# Patient Record
Sex: MALE | Race: WHITE | HISPANIC OR LATINO | Employment: PART TIME | URBAN - METROPOLITAN AREA
[De-identification: names, ages, dates, MRNs, and addresses within clinical notes are randomized per-mention and may not be internally consistent; named-entity substitution may affect disease eponyms.]

---

## 2017-02-24 ENCOUNTER — HOSPITAL ENCOUNTER (EMERGENCY)
Facility: HOSPITAL | Age: 37
Discharge: HOME | End: 2017-02-25
Attending: EMERGENCY MEDICINE
Payer: MEDICARE

## 2017-02-24 DIAGNOSIS — K86.1 CHRONIC PANCREATITIS (HCC): Primary | ICD-10-CM

## 2017-02-24 LAB
ALBUMIN SERPL BCP-MCNC: 4.5 G/DL (ref 3.5–5)
ALP SERPL-CCNC: 121 U/L (ref 46–116)
ALT SERPL W P-5'-P-CCNC: 28 U/L (ref 12–78)
ANION GAP SERPL CALCULATED.3IONS-SCNC: 10 MMOL/L (ref 4–13)
AST SERPL W P-5'-P-CCNC: 22 U/L (ref 5–45)
BACTERIA UR QL AUTO: ABNORMAL /HPF
BASOPHILS # BLD AUTO: 0.02 THOUSANDS/ΜL (ref 0–0.1)
BASOPHILS NFR BLD AUTO: 0 % (ref 0–1)
BILIRUB SERPL-MCNC: 0.52 MG/DL (ref 0.2–1)
BILIRUB UR QL STRIP: ABNORMAL
BUN SERPL-MCNC: 22 MG/DL (ref 5–25)
CALCIUM SERPL-MCNC: 9.6 MG/DL (ref 8.3–10.1)
CHLORIDE SERPL-SCNC: 97 MMOL/L (ref 100–108)
CLARITY UR: CLEAR
CO2 SERPL-SCNC: 36 MMOL/L (ref 21–32)
COLOR UR: YELLOW
COLOR, POC: YELLOW
CREAT SERPL-MCNC: 1.05 MG/DL (ref 0.6–1.3)
EOSINOPHIL # BLD AUTO: 0.15 THOUSAND/ΜL (ref 0–0.61)
EOSINOPHIL NFR BLD AUTO: 2 % (ref 0–6)
ERYTHROCYTE [DISTWIDTH] IN BLOOD BY AUTOMATED COUNT: 14.5 % (ref 11.6–15.1)
GFR SERPL CREATININE-BSD FRML MDRD: >60 ML/MIN/1.73SQ M
GLUCOSE SERPL-MCNC: 168 MG/DL (ref 65–140)
GLUCOSE UR STRIP-MCNC: NEGATIVE MG/DL
HCT VFR BLD AUTO: 50 % (ref 36.5–49.3)
HGB BLD-MCNC: 17 G/DL (ref 12–17)
HGB UR QL STRIP.AUTO: ABNORMAL
KETONES UR STRIP-MCNC: NEGATIVE MG/DL
LEUKOCYTE ESTERASE UR QL STRIP: ABNORMAL
LIPASE SERPL-CCNC: 545 U/L (ref 73–393)
LYMPHOCYTES # BLD AUTO: 1.91 THOUSANDS/ΜL (ref 0.6–4.47)
LYMPHOCYTES NFR BLD AUTO: 29 % (ref 14–44)
MCH RBC QN AUTO: 28.6 PG (ref 26.8–34.3)
MCHC RBC AUTO-ENTMCNC: 34 G/DL (ref 31.4–37.4)
MCV RBC AUTO: 84 FL (ref 82–98)
MONOCYTES # BLD AUTO: 0.45 THOUSAND/ΜL (ref 0.17–1.22)
MONOCYTES NFR BLD AUTO: 7 % (ref 4–12)
MUCOUS THREADS UR QL AUTO: ABNORMAL
NEUTROPHILS # BLD AUTO: 4.17 THOUSANDS/ΜL (ref 1.85–7.62)
NEUTS SEG NFR BLD AUTO: 62 % (ref 43–75)
NITRITE UR QL STRIP: NEGATIVE
NON-SQ EPI CELLS URNS QL MICRO: ABNORMAL /HPF
NRBC BLD AUTO-RTO: 0 /100 WBCS
OTHER STN SPEC: ABNORMAL
PH UR STRIP.AUTO: 5.5 [PH] (ref 4.5–8)
PLATELET # BLD AUTO: 147 THOUSANDS/UL (ref 149–390)
PMV BLD AUTO: 10.1 FL (ref 8.9–12.7)
POTASSIUM SERPL-SCNC: 3.6 MMOL/L (ref 3.5–5.3)
PROT SERPL-MCNC: 8.5 G/DL (ref 6.4–8.2)
PROT UR STRIP-MCNC: ABNORMAL MG/DL
RBC # BLD AUTO: 5.95 MILLION/UL (ref 3.88–5.62)
RBC #/AREA URNS AUTO: ABNORMAL /HPF
SODIUM SERPL-SCNC: 143 MMOL/L (ref 136–145)
SP GR UR STRIP.AUTO: >=1.03 (ref 1–1.03)
UROBILINOGEN UR QL STRIP.AUTO: 0.2 E.U./DL
WBC # BLD AUTO: 6.7 THOUSAND/UL (ref 4.31–10.16)
WBC #/AREA URNS AUTO: ABNORMAL /HPF

## 2017-02-24 PROCEDURE — 36415 COLL VENOUS BLD VENIPUNCTURE: CPT

## 2017-02-24 PROCEDURE — 81001 URINALYSIS AUTO W/SCOPE: CPT

## 2017-02-24 PROCEDURE — 85025 COMPLETE CBC W/AUTO DIFF WBC: CPT

## 2017-02-24 PROCEDURE — 96375 TX/PRO/DX INJ NEW DRUG ADDON: CPT

## 2017-02-24 PROCEDURE — 81002 URINALYSIS NONAUTO W/O SCOPE: CPT

## 2017-02-24 PROCEDURE — 96374 THER/PROPH/DIAG INJ IV PUSH: CPT

## 2017-02-24 PROCEDURE — 80053 COMPREHEN METABOLIC PANEL: CPT

## 2017-02-24 PROCEDURE — 96361 HYDRATE IV INFUSION ADD-ON: CPT

## 2017-02-24 PROCEDURE — 83690 ASSAY OF LIPASE: CPT | Performed by: EMERGENCY MEDICINE

## 2017-02-24 PROCEDURE — 87086 URINE CULTURE/COLONY COUNT: CPT

## 2017-02-24 RX ORDER — ONDANSETRON 2 MG/ML
4 INJECTION INTRAMUSCULAR; INTRAVENOUS ONCE
Status: COMPLETED | OUTPATIENT
Start: 2017-02-24 | End: 2017-02-24

## 2017-02-24 RX ADMIN — HYDROMORPHONE HYDROCHLORIDE 1 MG: 1 INJECTION, SOLUTION INTRAMUSCULAR; INTRAVENOUS; SUBCUTANEOUS at 22:03

## 2017-02-24 RX ADMIN — ONDANSETRON 4 MG: 2 INJECTION INTRAMUSCULAR; INTRAVENOUS at 22:02

## 2017-02-24 RX ADMIN — SODIUM CHLORIDE 1000 ML: 0.9 INJECTION, SOLUTION INTRAVENOUS at 22:01

## 2017-02-25 VITALS
RESPIRATION RATE: 16 BRPM | TEMPERATURE: 98.3 F | WEIGHT: 230 LBS | SYSTOLIC BLOOD PRESSURE: 139 MMHG | OXYGEN SATURATION: 94 % | HEART RATE: 67 BPM | DIASTOLIC BLOOD PRESSURE: 60 MMHG

## 2017-02-25 PROCEDURE — 99284 EMERGENCY DEPT VISIT MOD MDM: CPT

## 2017-02-26 LAB — BACTERIA UR CULT: NORMAL

## 2018-03-09 ENCOUNTER — APPOINTMENT (EMERGENCY)
Dept: CT IMAGING | Facility: HOSPITAL | Age: 38
End: 2018-03-09
Payer: MEDICARE

## 2018-03-09 ENCOUNTER — HOSPITAL ENCOUNTER (EMERGENCY)
Facility: HOSPITAL | Age: 38
Discharge: HOME | End: 2018-03-09
Attending: EMERGENCY MEDICINE | Admitting: EMERGENCY MEDICINE
Payer: MEDICARE

## 2018-03-09 VITALS
WEIGHT: 234 LBS | TEMPERATURE: 99.2 F | OXYGEN SATURATION: 95 % | SYSTOLIC BLOOD PRESSURE: 151 MMHG | HEART RATE: 77 BPM | RESPIRATION RATE: 20 BRPM | DIASTOLIC BLOOD PRESSURE: 92 MMHG

## 2018-03-09 DIAGNOSIS — I10 HYPERTENSION: ICD-10-CM

## 2018-03-09 DIAGNOSIS — G89.29 CHRONIC ABDOMINAL PAIN: Primary | ICD-10-CM

## 2018-03-09 DIAGNOSIS — K86.1 CHRONIC PANCREATITIS (HCC): ICD-10-CM

## 2018-03-09 DIAGNOSIS — R10.9 CHRONIC ABDOMINAL PAIN: Primary | ICD-10-CM

## 2018-03-09 LAB
ALBUMIN SERPL BCP-MCNC: 4 G/DL (ref 3.5–5)
ALP SERPL-CCNC: 97 U/L (ref 46–116)
ALT SERPL W P-5'-P-CCNC: 20 U/L (ref 12–78)
ANION GAP SERPL CALCULATED.3IONS-SCNC: 10 MMOL/L (ref 4–13)
AST SERPL W P-5'-P-CCNC: 13 U/L (ref 5–45)
BASOPHILS # BLD AUTO: 0.02 THOUSANDS/ΜL (ref 0–0.1)
BASOPHILS NFR BLD AUTO: 0 % (ref 0–1)
BILIRUB SERPL-MCNC: 0.39 MG/DL (ref 0.2–1)
BUN SERPL-MCNC: 8 MG/DL (ref 5–25)
CALCIUM SERPL-MCNC: 9.1 MG/DL (ref 8.3–10.1)
CHLORIDE SERPL-SCNC: 101 MMOL/L (ref 100–108)
CO2 SERPL-SCNC: 29 MMOL/L (ref 21–32)
CREAT SERPL-MCNC: 0.81 MG/DL (ref 0.6–1.3)
EOSINOPHIL # BLD AUTO: 0.12 THOUSAND/ΜL (ref 0–0.61)
EOSINOPHIL NFR BLD AUTO: 2 % (ref 0–6)
ERYTHROCYTE [DISTWIDTH] IN BLOOD BY AUTOMATED COUNT: 14 % (ref 11.6–15.1)
GFR SERPL CREATININE-BSD FRML MDRD: 112 ML/MIN/1.73SQ M
GLUCOSE SERPL-MCNC: 149 MG/DL (ref 65–140)
HCT VFR BLD AUTO: 46.1 % (ref 36.5–49.3)
HGB BLD-MCNC: 15.5 G/DL (ref 12–17)
LIPASE SERPL-CCNC: 256 U/L (ref 73–393)
LYMPHOCYTES # BLD AUTO: 1.31 THOUSANDS/ΜL (ref 0.6–4.47)
LYMPHOCYTES NFR BLD AUTO: 23 % (ref 14–44)
MCH RBC QN AUTO: 27.1 PG (ref 26.8–34.3)
MCHC RBC AUTO-ENTMCNC: 33.6 G/DL (ref 31.4–37.4)
MCV RBC AUTO: 81 FL (ref 82–98)
MONOCYTES # BLD AUTO: 0.28 THOUSAND/ΜL (ref 0.17–1.22)
MONOCYTES NFR BLD AUTO: 5 % (ref 4–12)
NEUTROPHILS # BLD AUTO: 3.86 THOUSANDS/ΜL (ref 1.85–7.62)
NEUTS SEG NFR BLD AUTO: 70 % (ref 43–75)
NRBC BLD AUTO-RTO: 0 /100 WBCS
PLATELET # BLD AUTO: 102 THOUSANDS/UL (ref 149–390)
PMV BLD AUTO: 10.1 FL (ref 8.9–12.7)
POTASSIUM SERPL-SCNC: 3.4 MMOL/L (ref 3.5–5.3)
PROT SERPL-MCNC: 8 G/DL (ref 6.4–8.2)
RBC # BLD AUTO: 5.72 MILLION/UL (ref 3.88–5.62)
SODIUM SERPL-SCNC: 140 MMOL/L (ref 136–145)
WBC # BLD AUTO: 5.59 THOUSAND/UL (ref 4.31–10.16)

## 2018-03-09 PROCEDURE — 99284 EMERGENCY DEPT VISIT MOD MDM: CPT

## 2018-03-09 PROCEDURE — 96374 THER/PROPH/DIAG INJ IV PUSH: CPT

## 2018-03-09 PROCEDURE — 36415 COLL VENOUS BLD VENIPUNCTURE: CPT | Performed by: EMERGENCY MEDICINE

## 2018-03-09 PROCEDURE — 83690 ASSAY OF LIPASE: CPT | Performed by: EMERGENCY MEDICINE

## 2018-03-09 PROCEDURE — 80053 COMPREHEN METABOLIC PANEL: CPT | Performed by: EMERGENCY MEDICINE

## 2018-03-09 PROCEDURE — 85025 COMPLETE CBC W/AUTO DIFF WBC: CPT | Performed by: EMERGENCY MEDICINE

## 2018-03-09 PROCEDURE — 96376 TX/PRO/DX INJ SAME DRUG ADON: CPT

## 2018-03-09 PROCEDURE — 96361 HYDRATE IV INFUSION ADD-ON: CPT

## 2018-03-09 PROCEDURE — 96375 TX/PRO/DX INJ NEW DRUG ADDON: CPT

## 2018-03-09 PROCEDURE — 74177 CT ABD & PELVIS W/CONTRAST: CPT

## 2018-03-09 RX ORDER — HYDROMORPHONE HCL 110MG/55ML
2 PATIENT CONTROLLED ANALGESIA SYRINGE INTRAVENOUS ONCE
Status: COMPLETED | OUTPATIENT
Start: 2018-03-09 | End: 2018-03-09

## 2018-03-09 RX ORDER — ONDANSETRON 2 MG/ML
4 INJECTION INTRAMUSCULAR; INTRAVENOUS ONCE
Status: COMPLETED | OUTPATIENT
Start: 2018-03-09 | End: 2018-03-09

## 2018-03-09 RX ADMIN — IOHEXOL 100 ML: 350 INJECTION, SOLUTION INTRAVENOUS at 15:27

## 2018-03-09 RX ADMIN — SODIUM CHLORIDE 1000 ML: 0.9 INJECTION, SOLUTION INTRAVENOUS at 14:13

## 2018-03-09 RX ADMIN — HYDROMORPHONE HYDROCHLORIDE 2 MG: 2 INJECTION, SOLUTION INTRAMUSCULAR; INTRAVENOUS; SUBCUTANEOUS at 14:29

## 2018-03-09 RX ADMIN — ONDANSETRON 4 MG: 2 INJECTION INTRAMUSCULAR; INTRAVENOUS at 15:39

## 2018-03-09 RX ADMIN — HYDROMORPHONE HYDROCHLORIDE 1 MG: 1 INJECTION, SOLUTION INTRAMUSCULAR; INTRAVENOUS; SUBCUTANEOUS at 15:35

## 2018-03-09 NOTE — ED PROVIDER NOTES
History  Chief Complaint   Patient presents with    Abdominal Pain     pt with hx chronic pancreatitis, c/o abdominal pain radiating from left side to right side of abdomen for 2 weeks  c/o nausea, vomiting and diarrhea for two weeks also pt feels "bloated" PCP called recommended to come to ED for eval     78-year-old male presents for evaluation of 2 weeks of progressive, constant severe abdominal pain  The patient has a history of chronic return pancreatitis secondary to hypertriglyceridemia  The patient states that he has had persistent nausea vomiting and diarrhea with severe pain that has been unrelieved by his Zofran and oxycodone at home  His pain is worse with eating  Abdominal Pain   Associated symptoms: diarrhea, nausea and vomiting    Associated symptoms: no chills and no fever        Prior to Admission Medications   Prescriptions Last Dose Informant Patient Reported? Taking?    Albuterol (VENTOLIN IN)   Yes No   Sig: Inhale 90 mcg   amLODIPine (NORVASC) 5 mg tablet   Yes No   Sig: Take 5 mg by mouth daily   calcium carbonate-vitamin D (OSCAL-D) 500 mg-200 units per tablet   Yes No   Sig: Take 1 tablet by mouth daily with breakfast   gabapentin (NEURONTIN) 300 mg capsule   Yes No   Sig: Take 600 mg by mouth 3 (three) times a day   gemfibrozil (LOPID) 600 mg tablet   Yes No   Sig: Take 600 mg by mouth 2 (two) times a day before meals   metoprolol tartrate (LOPRESSOR) 25 mg tablet   Yes No   Sig: Take 25 mg by mouth 2 (two) times a day   nicotine (NICODERM CQ) 21 mg/24 hr TD 24 hr patch   No No   Sig: Place 1 patch on the skin daily for 30 days   omeprazole (PriLOSEC) 40 MG capsule   Yes No   Sig: Take 40 mg by mouth daily   oxyCODONE (ROXICODONE) 15 mg immediate release tablet   Yes No   Sig: Take 15 mg by mouth every 4 (four) hours as needed for moderate pain   potassium chloride (K-DUR,KLOR-CON) 20 mEq tablet   Yes No   Sig: Take 20 mEq by mouth 2 (two) times a day      Facility-Administered Medications: None       Past Medical History:   Diagnosis Date    Asthma     [facility]     [facility]     Hypertension        Past Surgical History:   Procedure Laterality Date    ABDOMINAL SURGERY      FRACTURE SURGERY         History reviewed  No pertinent family history  I have reviewed and agree with the history as documented  Social History   Substance Use Topics    Smoking status: Current Every Day Smoker     Packs/day: 3 00    Smokeless tobacco: Never Used    Alcohol use No        Review of Systems   Constitutional: Negative for chills and fever  Gastrointestinal: Positive for abdominal pain, diarrhea, nausea and vomiting  All other systems reviewed and are negative  Physical Exam  ED Triage Vitals [03/09/18 1400]   Temperature Pulse Respirations Blood Pressure SpO2   99 2 °F (37 3 °C) 97 18 170/88 98 %      Temp Source Heart Rate Source Patient Position - Orthostatic VS BP Location FiO2 (%)   Oral Monitor Lying Right arm --      Pain Score       Worst Possible Pain           Orthostatic Vital Signs  Vitals:    03/09/18 1400 03/09/18 1530   BP: 170/88 151/92   Pulse: 97 77   Patient Position - Orthostatic VS: Lying        Physical Exam   Constitutional: He is oriented to person, place, and time  He appears well-developed and well-nourished  No distress  HENT:   Head: Normocephalic and atraumatic  Right Ear: External ear normal    Left Ear: External ear normal    Mouth/Throat: No oropharyngeal exudate  Eyes: EOM are normal  Pupils are equal, round, and reactive to light  No scleral icterus  Neck: Normal range of motion  Neck supple  Cardiovascular: Normal rate, regular rhythm and normal heart sounds  Pulmonary/Chest: Effort normal and breath sounds normal  No respiratory distress  Abdominal: Soft  Bowel sounds are normal  There is tenderness in the epigastric area  There is no rebound and no guarding  Musculoskeletal: Normal range of motion  Neurological: He is alert and oriented to person, place, and time  Skin: Skin is warm and dry  No rash noted  Psychiatric: He has a normal mood and affect  Nursing note and vitals reviewed  ED Medications  Medications   sodium chloride 0 9 % bolus 1,000 mL (0 mL Intravenous Stopped 3/9/18 1526)   ondansetron (ZOFRAN) injection 4 mg (4 mg Intravenous Given 3/9/18 1539)   HYDROmorphone (DILAUDID) injection 2 mg (2 mg Intravenous Given 3/9/18 1429)   iohexol (OMNIPAQUE) 350 MG/ML injection (MULTI-DOSE) 100 mL (100 mL Intravenous Given 3/9/18 1527)   HYDROmorphone (DILAUDID) injection 1 mg (1 mg Intravenous Given 3/9/18 1535)       Diagnostic Studies  Results Reviewed     Procedure Component Value Units Date/Time    Comprehensive metabolic panel [[id number]]  (Abnormal) Collected:  03/09/18 1413    Lab Status:  Edited Result - FINAL Specimen:  Blood from Arm, Right Updated:  03/09/18 1506     Sodium 140 mmol/L      Potassium 3 4 (L) mmol/L      Chloride 101 mmol/L      CO2 29 mmol/L      Anion Gap 10 mmol/L      BUN 8 mg/dL      Creatinine 0 81 mg/dL      Glucose 149 (H) mg/dL      Calcium 9 1 mg/dL      AST 13 U/L      ALT 20 U/L      Alkaline Phosphatase 97 U/L      Total Protein 8 0 g/dL      Albumin 4 0 g/dL      Total Bilirubin 0 39 mg/dL      eGFR 112 ml/min/1 73sq m     Narrative:         National Kidney Disease Education Program recommendations are as follows:  GFR calculation is accurate only with a steady state creatinine  Chronic Kidney disease less than 60 ml/min/1 73 sq  meters  Kidney failure less than 15 ml/min/1 73 sq  meters      Lipase [[id number]]  (Normal) Collected:  03/09/18 1413    Lab Status:  Final result Specimen:  Blood from Arm, Right Updated:  03/09/18 1449     Lipase 256 u/L     CBC and differential [[id number]]  (Abnormal) Collected:  03/09/18 1413    Lab Status:  Final result Specimen:  Blood from Arm, Right Updated:  03/09/18 1435     WBC 5 59 Thousand/uL      RBC 5 72 (H) Million/uL      Hemoglobin 15 5 g/dL      Hematocrit 46 1 %      MCV 81 (L) fL      MCH 27 1 pg      MCHC 33 6 g/dL      RDW 14 0 %      MPV 10 1 fL      Platelets 135 (L) Thousands/uL      nRBC 0 /100 WBCs      Neutrophils Relative 70 %      Lymphocytes Relative 23 %      Monocytes Relative 5 %      Eosinophils Relative 2 %      Basophils Relative 0 %      Neutrophils Absolute 3 86 Thousands/µL      Lymphocytes Absolute 1 31 Thousands/µL      Monocytes Absolute 0 28 Thousand/µL      Eosinophils Absolute 0 12 Thousand/µL      Basophils Absolute 0 02 Thousands/µL                  CT abdomen pelvis with contrast   Final Result by Karolina Sy MD (03/09 1607)      No acute pathology  Presumed old changes in the pancreas related to remote episodes of pancreatitis, including atrophy of the tail and multiple presumed calcified pseudocysts in the region of the tail and splenic hilum  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  No    definite evidence of acute pancreatitis  Hepatosplenomegaly  Tiny nonobstructing right renal calculus  Workstation performed: [id number]                    Procedures  Procedures       Phone Contacts  ED Phone Contact    ED Course  ED Course as of Mar 09 1612   Ramona Bravo Mar 09, 2018   1530 Patient with continued pain at a 9  Will give additional pain medication                                MDM  Number of Diagnoses or Management Options  Chronic abdominal pain: new and requires workup  Chronic pancreatitis [facility]): new and requires workup  Hypertension: minor  Diagnosis management comments: 70-year-old male presents for 2 weeks of worsening upper abdominal pain that is unrelieved by his home medications of Zofran and oxycodone  The plan is to obtain laboratories for possible exacerbation chronic pancreatitis  The drug database was searched and I note that the patient gets 120  15mg oxycodone tablets every month    Given the patient's chronic opioid use, the plan is to give the patient Zofran and 2 mg of Dilaudid until I have diagnostics to evaluate for potential exacerbation of the patient's pain  All labs reviewed and utilized in the medical decision making process    All radiology studies independently viewed by me and interpreted by the radiologist     The patient (and any family present) verbalized understanding of the discharge instructions and warnings that would necessitate return to the Emergency Department  All questions were answered prior to discharge  Amount and/or Complexity of Data Reviewed  Clinical lab tests: ordered and reviewed  Tests in the radiology section of CPT®: ordered and reviewed  Review and summarize past medical records: yes  Independent visualization of images, tracings, or specimens: yes      CritCare Time    Disposition  Final diagnoses:   Chronic abdominal pain   Chronic pancreatitis ([facility] )   Hypertension     Time reflects when diagnosis was documented in both MDM as applicable and the Disposition within this note     Time User Action Codes Description Comment    3/9/2018  4:09 PM Al Soda Add [R10 9,  G89 29] Chronic abdominal pain     3/9/2018  4:09 PM Al Soda Add [K86 1] [facility]     3/9/2018  4:09 PM Maribell Graves 49 Hypertension       ED Disposition     ED Disposition Condition Comment    Discharge  Dom Sis discharge to home/self care      Condition at discharge: Stable        Follow-up Information     Follow up With Specialties Details Why Contact Info    Terra Coates MD  Schedule an appointment as soon as possible for a visit For further evaluation [address] [place] [address] [facility] [facility] Schedule an appointment as soon as possible for a visit For further evaluation if unable to follow up with your GI doctor [facility] [id number] [phone]        Patient's Medications Discharge Prescriptions    No medications on file     No discharge procedures on file      ED Provider  Electronically Signed by           Shanita Murillo DO  03/09/18 0109

## 2018-03-09 NOTE — DISCHARGE INSTRUCTIONS
Chronic Abdominal Pain   WHAT YOU NEED TO KNOW:   The cause of chronic abdominal pain may not be found  You may be given medicine to manage symptoms  You may need therapy to help manage anxiety or stress that is causing abdominal pain  Rarely, surgery is needed if there is a problem with an organ in your abdomen  DISCHARGE INSTRUCTIONS:   Return to the emergency department if:   · Your abdominal pain gets worse, and spreads to your back  · You vomit blood or what looks like coffee grounds  · You have blood or mucus in your bowel movement  · You cannot stop vomiting  · You have diarrhea for more than 1 week  · You feel weak, dizzy, or faint  · Your abdomen is larger than usual, more painful, and hard  Contact your healthcare provider if:   · You have a fever or chills  · You have new symptoms  · You lose weight without trying  · Your pain prevents you from doing your daily activities  · You have questions or concerns about your condition or care  Medicines:   · Medicines  may be given to decrease pain and manage your symptoms  Medicines may also be given to manage anxiety  · Take your medicine as directed  Contact your healthcare provider if you think your medicine is not helping or if you have side effects  Tell him of her if you are allergic to any medicine  Keep a list of the medicines, vitamins, and herbs you take  Include the amounts, and when and why you take them  Bring the list or the pill bottles to follow-up visits  Carry your medicine list with you in case of an emergency  Self-care:   · Apply heat  on your abdomen for 20 to 30 minutes every 2 hours for as many days as directed  Heat helps decrease pain and muscle spasms  · Make changes to the food you eat as directed  Do not eat foods that cause abdominal pain or other symptoms  Eat small meals more often  ¨ Eat more high-fiber foods if you are constipated   High-fiber foods include fruits, vegetables, whole-grain foods, and legumes  ¨ Do not eat foods that cause gas if you have bloating  Examples include broccoli, cabbage, and cauliflower  Do not drink soda or carbonated drinks, because these may also cause gas  ¨ Do not eat foods or drinks that contain sorbitol or fructose if you have diarrhea and bloating  Some examples are fruit juices, candy, jelly, and sugar-free gum  ¨ Do not eat high-fat foods, such as fried foods, cheeseburgers, hot dogs, and desserts  ¨ Limit or do not drink caffeine  Caffeine may make symptoms, such as heart burn or nausea, worse  ¨ Drink plenty of liquids to prevent dehydration from diarrhea or vomiting  Ask your healthcare provider how much liquid to drink each day and which liquids are best for you  · Keep a diary of your abdominal pain  A diary may help your healthcare provider learn what is causing your abdominal pain  Include when the pain happens, how long it lasts, and what the pain feels like  Write down any other symptoms you have with abdominal pain  Also write down what you eat, and what symptoms you have after you eat  · Manage your stress  Stress may cause abdominal pain  Your healthcare provider may recommend relaxation techniques and deep breathing exercises to help decrease your stress  Your healthcare provider may recommend you talk to someone about your stress or anxiety, such as a counselor or a trusted friend  Get plenty of sleep and exercise regularly  · Limit or do not drink alcohol  Alcohol can make your abdominal pain worse  Ask your healthcare provider if it is safe for you to drink alcohol  Also ask how much is safe for you to drink  · Do not smoke  Nicotine and other chemicals in cigarettes can damage your esophagus and stomach  Ask your healthcare provider for information if you currently smoke and need help to quit  E-cigarettes or smokeless tobacco still contain nicotine   Talk to your healthcare provider before you use these products  Follow up with your healthcare provider as directed: You may need more tests  Write down your questions so you remember to ask them during your visits  © 2017 [address] [employer] Information is for End User's use only and may not be sold, redistributed or otherwise used for commercial purposes  All illustrations and images included in CareNotes® are the copyrighted property of A D A M [employer] Inc  or Marito Garcia  The above information is an  only  It is not intended as medical advice for individual conditions or treatments  Talk to your doctor, nurse or pharmacist before following any medical regimen to see if it is safe and effective for you

## 2018-04-24 ENCOUNTER — HOSPITAL ENCOUNTER (OUTPATIENT)
Facility: HOSPITAL | Age: 38
Setting detail: OBSERVATION
Discharge: LEFT BEFORE DISCHARGE | End: 2018-04-25
Attending: EMERGENCY MEDICINE | Admitting: INTERNAL MEDICINE
Payer: MEDICARE

## 2018-04-24 DIAGNOSIS — K85.90 ACUTE ON CHRONIC PANCREATITIS (HCC): Primary | ICD-10-CM

## 2018-04-24 DIAGNOSIS — K86.1 ACUTE ON CHRONIC PANCREATITIS (HCC): Primary | ICD-10-CM

## 2018-04-24 DIAGNOSIS — I82.891 CHRONIC THROMBOSIS OF SPLENIC VEIN: ICD-10-CM

## 2018-04-24 PROBLEM — Z87.19: Status: ACTIVE | Noted: 2018-04-24

## 2018-04-24 PROBLEM — E78.1 FAMILIAL HYPERTRIGLYCERIDEMIA: Status: ACTIVE | Noted: 2018-04-24

## 2018-04-24 PROBLEM — I86.4 GASTRIC VARICES WITHOUT BLEEDING: Status: ACTIVE | Noted: 2018-04-24

## 2018-04-24 PROBLEM — Z86.79: Status: ACTIVE | Noted: 2018-04-24

## 2018-04-24 PROBLEM — K86.89 SECONDARY PANCREATIC INSUFFICIENCY: Status: ACTIVE | Noted: 2018-04-24

## 2018-04-24 LAB
ALBUMIN SERPL BCP-MCNC: 4.2 G/DL (ref 3.5–5)
ALP SERPL-CCNC: 92 U/L (ref 46–116)
ALT SERPL W P-5'-P-CCNC: 23 U/L (ref 12–78)
ANION GAP SERPL CALCULATED.3IONS-SCNC: 6 MMOL/L (ref 4–13)
AST SERPL W P-5'-P-CCNC: 17 U/L (ref 5–45)
BASOPHILS # BLD AUTO: 0.02 THOUSANDS/ΜL (ref 0–0.1)
BASOPHILS NFR BLD AUTO: 0 % (ref 0–1)
BILIRUB SERPL-MCNC: 0.53 MG/DL (ref 0.2–1)
BUN SERPL-MCNC: 15 MG/DL (ref 5–25)
CALCIUM SERPL-MCNC: 9.1 MG/DL (ref 8.3–10.1)
CHLORIDE SERPL-SCNC: 98 MMOL/L (ref 100–108)
CHOLEST SERPL-MCNC: 180 MG/DL (ref 50–200)
CO2 SERPL-SCNC: 33 MMOL/L (ref 21–32)
CREAT SERPL-MCNC: 0.79 MG/DL (ref 0.6–1.3)
EOSINOPHIL # BLD AUTO: 0.09 THOUSAND/ΜL (ref 0–0.61)
EOSINOPHIL NFR BLD AUTO: 2 % (ref 0–6)
ERYTHROCYTE [DISTWIDTH] IN BLOOD BY AUTOMATED COUNT: 14.5 % (ref 11.6–15.1)
GFR SERPL CREATININE-BSD FRML MDRD: 113 ML/MIN/1.73SQ M
GLUCOSE SERPL-MCNC: 185 MG/DL (ref 65–140)
HCT VFR BLD AUTO: 43.6 % (ref 36.5–49.3)
HDLC SERPL-MCNC: 27 MG/DL (ref 40–60)
HGB BLD-MCNC: 14.6 G/DL (ref 12–17)
LIPASE SERPL-CCNC: 1035 U/L (ref 73–393)
LYMPHOCYTES # BLD AUTO: 1.24 THOUSANDS/ΜL (ref 0.6–4.47)
LYMPHOCYTES NFR BLD AUTO: 22 % (ref 14–44)
MCH RBC QN AUTO: 27.5 PG (ref 26.8–34.3)
MCHC RBC AUTO-ENTMCNC: 33.5 G/DL (ref 31.4–37.4)
MCV RBC AUTO: 82 FL (ref 82–98)
MONOCYTES # BLD AUTO: 0.29 THOUSAND/ΜL (ref 0.17–1.22)
MONOCYTES NFR BLD AUTO: 5 % (ref 4–12)
NEUTROPHILS # BLD AUTO: 4.06 THOUSANDS/ΜL (ref 1.85–7.62)
NEUTS SEG NFR BLD AUTO: 71 % (ref 43–75)
NONHDLC SERPL-MCNC: 153 MG/DL
NRBC BLD AUTO-RTO: 0 /100 WBCS
PLATELET # BLD AUTO: 111 THOUSANDS/UL (ref 149–390)
PLATELET # BLD AUTO: 117 THOUSANDS/UL (ref 149–390)
PMV BLD AUTO: 10 FL (ref 8.9–12.7)
PMV BLD AUTO: 10 FL (ref 8.9–12.7)
POTASSIUM SERPL-SCNC: 3.8 MMOL/L (ref 3.5–5.3)
PROT SERPL-MCNC: 8 G/DL (ref 6.4–8.2)
RBC # BLD AUTO: 5.3 MILLION/UL (ref 3.88–5.62)
SODIUM SERPL-SCNC: 137 MMOL/L (ref 136–145)
TRIGL SERPL-MCNC: 489 MG/DL
WBC # BLD AUTO: 5.7 THOUSAND/UL (ref 4.31–10.16)

## 2018-04-24 PROCEDURE — 80053 COMPREHEN METABOLIC PANEL: CPT | Performed by: EMERGENCY MEDICINE

## 2018-04-24 PROCEDURE — 96374 THER/PROPH/DIAG INJ IV PUSH: CPT

## 2018-04-24 PROCEDURE — C9113 INJ PANTOPRAZOLE SODIUM, VIA: HCPCS | Performed by: INTERNAL MEDICINE

## 2018-04-24 PROCEDURE — 80061 LIPID PANEL: CPT | Performed by: INTERNAL MEDICINE

## 2018-04-24 PROCEDURE — 99285 EMERGENCY DEPT VISIT HI MDM: CPT

## 2018-04-24 PROCEDURE — 85025 COMPLETE CBC W/AUTO DIFF WBC: CPT | Performed by: EMERGENCY MEDICINE

## 2018-04-24 PROCEDURE — 85049 AUTOMATED PLATELET COUNT: CPT | Performed by: INTERNAL MEDICINE

## 2018-04-24 PROCEDURE — 99219 PR INITIAL OBSERVATION CARE/DAY 50 MINUTES: CPT | Performed by: INTERNAL MEDICINE

## 2018-04-24 PROCEDURE — 83690 ASSAY OF LIPASE: CPT | Performed by: EMERGENCY MEDICINE

## 2018-04-24 PROCEDURE — 36415 COLL VENOUS BLD VENIPUNCTURE: CPT | Performed by: EMERGENCY MEDICINE

## 2018-04-24 PROCEDURE — 96361 HYDRATE IV INFUSION ADD-ON: CPT

## 2018-04-24 PROCEDURE — 96375 TX/PRO/DX INJ NEW DRUG ADDON: CPT

## 2018-04-24 RX ORDER — B-COMPLEX WITH VITAMIN C
1 TABLET ORAL
Status: DISCONTINUED | OUTPATIENT
Start: 2018-04-25 | End: 2018-04-25 | Stop reason: HOSPADM

## 2018-04-24 RX ORDER — AMLODIPINE BESYLATE 5 MG/1
5 TABLET ORAL DAILY
Status: DISCONTINUED | OUTPATIENT
Start: 2018-04-24 | End: 2018-04-25 | Stop reason: HOSPADM

## 2018-04-24 RX ORDER — GEMFIBROZIL 600 MG/1
600 TABLET, FILM COATED ORAL
Status: DISCONTINUED | OUTPATIENT
Start: 2018-04-24 | End: 2018-04-25 | Stop reason: HOSPADM

## 2018-04-24 RX ORDER — SENNOSIDES 8.6 MG
1 TABLET ORAL DAILY
Status: DISCONTINUED | OUTPATIENT
Start: 2018-04-24 | End: 2018-04-25 | Stop reason: HOSPADM

## 2018-04-24 RX ORDER — NICOTINE 21 MG/24HR
1 PATCH, TRANSDERMAL 24 HOURS TRANSDERMAL DAILY
Status: DISCONTINUED | OUTPATIENT
Start: 2018-04-24 | End: 2018-04-25 | Stop reason: HOSPADM

## 2018-04-24 RX ORDER — SODIUM CHLORIDE 9 MG/ML
125 INJECTION, SOLUTION INTRAVENOUS CONTINUOUS
Status: DISCONTINUED | OUTPATIENT
Start: 2018-04-24 | End: 2018-04-25 | Stop reason: HOSPADM

## 2018-04-24 RX ORDER — ONDANSETRON 2 MG/ML
4 INJECTION INTRAMUSCULAR; INTRAVENOUS ONCE
Status: COMPLETED | OUTPATIENT
Start: 2018-04-24 | End: 2018-04-24

## 2018-04-24 RX ORDER — ONDANSETRON 2 MG/ML
4 INJECTION INTRAMUSCULAR; INTRAVENOUS EVERY 6 HOURS PRN
Status: DISCONTINUED | OUTPATIENT
Start: 2018-04-24 | End: 2018-04-25 | Stop reason: HOSPADM

## 2018-04-24 RX ORDER — ACETAMINOPHEN 325 MG/1
650 TABLET ORAL EVERY 6 HOURS PRN
Status: DISCONTINUED | OUTPATIENT
Start: 2018-04-24 | End: 2018-04-25 | Stop reason: HOSPADM

## 2018-04-24 RX ORDER — PANTOPRAZOLE SODIUM 40 MG/1
40 INJECTION, POWDER, FOR SOLUTION INTRAVENOUS EVERY 12 HOURS SCHEDULED
Status: DISCONTINUED | OUTPATIENT
Start: 2018-04-24 | End: 2018-04-25 | Stop reason: HOSPADM

## 2018-04-24 RX ADMIN — HYDROMORPHONE HYDROCHLORIDE 1 MG: 1 INJECTION, SOLUTION INTRAMUSCULAR; INTRAVENOUS; SUBCUTANEOUS at 13:54

## 2018-04-24 RX ADMIN — HYDROMORPHONE HYDROCHLORIDE 1 MG: 1 INJECTION, SOLUTION INTRAMUSCULAR; INTRAVENOUS; SUBCUTANEOUS at 15:42

## 2018-04-24 RX ADMIN — GEMFIBROZIL 600 MG: 600 TABLET ORAL at 16:04

## 2018-04-24 RX ADMIN — SODIUM CHLORIDE 125 ML/HR: 0.9 INJECTION, SOLUTION INTRAVENOUS at 15:40

## 2018-04-24 RX ADMIN — HYDROMORPHONE HYDROCHLORIDE 1 MG: 1 INJECTION, SOLUTION INTRAMUSCULAR; INTRAVENOUS; SUBCUTANEOUS at 11:37

## 2018-04-24 RX ADMIN — NICOTINE 1 PATCH: 21 PATCH, EXTENDED RELEASE TRANSDERMAL at 13:53

## 2018-04-24 RX ADMIN — ONDANSETRON 4 MG: 2 INJECTION INTRAMUSCULAR; INTRAVENOUS at 11:37

## 2018-04-24 RX ADMIN — OXYCODONE HYDROCHLORIDE 15 MG: 5 TABLET ORAL at 17:59

## 2018-04-24 RX ADMIN — HYDROMORPHONE HYDROCHLORIDE 1 MG: 1 INJECTION, SOLUTION INTRAMUSCULAR; INTRAVENOUS; SUBCUTANEOUS at 22:32

## 2018-04-24 RX ADMIN — AMLODIPINE BESYLATE 5 MG: 5 TABLET ORAL at 13:52

## 2018-04-24 RX ADMIN — OXYCODONE HYDROCHLORIDE 15 MG: 5 TABLET ORAL at 22:26

## 2018-04-24 RX ADMIN — PANCRELIPASE 12000 UNITS: 30000; 6000; 19000 CAPSULE, DELAYED RELEASE PELLETS ORAL at 16:04

## 2018-04-24 RX ADMIN — METOPROLOL TARTRATE 25 MG: 25 TABLET ORAL at 18:42

## 2018-04-24 RX ADMIN — HYDROMORPHONE HYDROCHLORIDE 1 MG: 1 INJECTION, SOLUTION INTRAMUSCULAR; INTRAVENOUS; SUBCUTANEOUS at 19:02

## 2018-04-24 RX ADMIN — SODIUM CHLORIDE 1000 ML: 0.9 INJECTION, SOLUTION INTRAVENOUS at 11:35

## 2018-04-24 RX ADMIN — PANTOPRAZOLE SODIUM 40 MG: 40 INJECTION, POWDER, FOR SOLUTION INTRAVENOUS at 21:05

## 2018-04-24 RX ADMIN — ONDANSETRON 4 MG: 2 INJECTION INTRAMUSCULAR; INTRAVENOUS at 16:26

## 2018-04-24 NOTE — ED PROVIDER NOTES
History  Chief Complaint   Patient presents with    Abdominal Pain     pt c/o left sided abdominal pain radiating to back that started2 days ago  hx pancreatitis  c/o nausea and vomiting  denies fevers  C/o mid abd  Pain radiating to the back for 2-3 days, constant  Similar to his chronic pancreatitis  He is  Taking oxycodone for pain and it's not helping  +n/v - taking zofran at home without relief  No diarrhea, no fevers, no dysuria, no hematuria  Previous abd  Surgery - pancreatic surgery            Prior to Admission Medications   Prescriptions Last Dose Informant Patient Reported? Taking? Albuterol (VENTOLIN IN)   Yes Yes   Sig: Inhale 90 mcg   amLODIPine (NORVASC) 5 mg tablet   Yes Yes   Sig: Take 5 mg by mouth daily   calcium carbonate-vitamin D (OSCAL-D) 500 mg-200 units per tablet   Yes Yes   Sig: Take 1 tablet by mouth daily with breakfast   gemfibrozil (LOPID) 600 mg tablet   Yes Yes   Sig: Take 600 mg by mouth 2 (two) times a day before meals   metoprolol tartrate (LOPRESSOR) 25 mg tablet   Yes Yes   Sig: Take 25 mg by mouth 2 (two) times a day   nicotine (NICODERM CQ) 21 mg/24 hr TD 24 hr patch   No Yes   Sig: Place 1 patch on the skin daily for 30 days   omeprazole (PriLOSEC) 40 MG capsule   Yes Yes   Sig: Take 40 mg by mouth daily   oxyCODONE (ROXICODONE) 15 mg immediate release tablet   Yes Yes   Sig: Take 15 mg by mouth every 4 (four) hours as needed for moderate pain      Facility-Administered Medications: None       Past Medical History:   Diagnosis Date    Asthma     [facility]     [facility]     Hypertension        Past Surgical History:   Procedure Laterality Date    ABDOMINAL SURGERY      FRACTURE SURGERY         History reviewed  No pertinent family history  I have reviewed and agree with the history as documented      Social History   Substance Use Topics    Smoking status: Current Every Day Smoker     Packs/day: 3 00    Smokeless tobacco: Never Used    Alcohol use No        Review of Systems   Constitutional: Negative for appetite change, fatigue and fever  HENT: Negative for rhinorrhea and sore throat  Respiratory: Negative for cough, shortness of breath and wheezing  Cardiovascular: Negative for chest pain and leg swelling  Gastrointestinal: Positive for abdominal pain, nausea and vomiting  Negative for diarrhea  Genitourinary: Negative for dysuria and flank pain  Musculoskeletal: Negative for back pain and neck pain  Skin: Negative for rash  Neurological: Negative for syncope and headaches  Psychiatric/Behavioral:        Mood normal       Physical Exam  ED Triage Vitals [04/24/18 1047]   Temperature Pulse Respirations Blood Pressure SpO2   97 9 °F (36 6 °C) 73 18 129/67 94 %      Temp Source Heart Rate Source Patient Position - Orthostatic VS BP Location FiO2 (%)   Oral Monitor Sitting Right arm --      Pain Score       Worst Possible Pain           Orthostatic Vital Signs  Vitals:    04/24/18 1047 04/24/18 1137 04/24/18 1254 04/24/18 1509   BP: 129/67 132/89 121/78 (!) 170/107   Pulse: 73 67 62 63   Patient Position - Orthostatic VS: Sitting Sitting Lying Lying       Physical Exam   Constitutional: He is oriented to person, place, and time  He appears well-developed and well-nourished  HENT:   Head: Normocephalic and atraumatic  Neck: Normal range of motion  Neck supple  Cardiovascular: Normal rate and regular rhythm  Pulmonary/Chest: Effort normal and breath sounds normal    Abdominal: Soft  Mid and lower abd  Tenderness, +guarding   Musculoskeletal: Normal range of motion  Neurological: He is alert and oriented to person, place, and time  Skin: Skin is warm and dry  Nursing note and vitals reviewed        ED Medications  Medications   amLODIPine (NORVASC) tablet 5 mg (5 mg Oral Given 4/24/18 1352)   calcium carbonate-vitamin D (OSCAL-D) 500 mg-200 units per tablet 1 tablet (not administered) gemfibrozil (LOPID) tablet 600 mg (not administered)   metoprolol tartrate (LOPRESSOR) tablet 25 mg (not administered)   nicotine (NICODERM CQ) 21 mg/24 hr TD 24 hr patch 1 patch (1 patch Transdermal Medication Applied 4/24/18 1353)   oxyCODONE (ROXICODONE) IR tablet 15 mg (not administered)   pantoprazole (PROTONIX) injection 40 mg (not administered)   HYDROmorphone (DILAUDID) injection 1 mg (not administered)   sodium chloride 0 9 % infusion (125 mL/hr Intravenous New Bag 4/24/18 1540)   senna (SENOKOT) tablet 8 6 mg (8 6 mg Oral Not Given 4/24/18 1530)   ondansetron (ZOFRAN) injection 4 mg (not administered)   nicotine (NICODERM CQ) 14 mg/24hr TD 24 hr patch 1 patch (0 patches Transdermal Hold 4/24/18 1530)   enoxaparin (LOVENOX) subcutaneous injection 40 mg (40 mg Subcutaneous Not Given 4/24/18 1536)   acetaminophen (TYLENOL) tablet 650 mg (not administered)   pancrelipase (Lip-Prot-Amyl) (CREON) delayed release capsule 12,000 Units (12,000 Units Oral Not Given 4/24/18 1539)   sodium chloride 0 9 % bolus 1,000 mL (0 mL Intravenous Stopped 4/24/18 1238)   ondansetron (ZOFRAN) injection 4 mg (4 mg Intravenous Given 4/24/18 1137)   HYDROmorphone (DILAUDID) injection 1 mg (1 mg Intravenous Given 4/24/18 1137)   HYDROmorphone (DILAUDID) injection 1 mg (1 mg Intravenous Given 4/24/18 1354)       Diagnostic Studies  Results Reviewed     Procedure Component Value Units Date/Time    Lipid panel [[id number]] Collected:  04/24/18 1535    Lab Status: In process Specimen:  Blood from Arm, Right Updated:  04/24/18 1539    Platelet count [[id number]] Collected:  04/24/18 1535    Lab Status:   In process Specimen:  Blood from Arm, Right Updated:  04/24/18 1539    Comprehensive metabolic panel [[id number]]  (Abnormal) Collected:  04/24/18 1134    Lab Status:  Final result Specimen:  Blood from Arm, Right Updated:  04/24/18 1211     Sodium 137 mmol/L      Potassium 3 8 mmol/L      Chloride 98 (L) mmol/L      CO2 33 (H) mmol/L Anion Gap 6 mmol/L      BUN 15 mg/dL      Creatinine 0 79 mg/dL      Glucose 185 (H) mg/dL      Calcium 9 1 mg/dL      AST 17 U/L      ALT 23 U/L      Alkaline Phosphatase 92 U/L      Total Protein 8 0 g/dL      Albumin 4 2 g/dL      Total Bilirubin 0 53 mg/dL      eGFR 113 ml/min/1 73sq m     Narrative:         National Kidney Disease Education Program recommendations are as follows:  GFR calculation is accurate only with a steady state creatinine  Chronic Kidney disease less than 60 ml/min/1 73 sq  meters  Kidney failure less than 15 ml/min/1 73 sq  meters      Lipase [[id number]]  (Abnormal) Collected:  04/24/18 1134    Lab Status:  Final result Specimen:  Blood from Arm, Right Updated:  04/24/18 1211     Lipase 1,035 (H) u/L     CBC and differential [[id number]]  (Abnormal) Collected:  04/24/18 1134    Lab Status:  Final result Specimen:  Blood from Arm, Right Updated:  04/24/18 1147     WBC 5 70 Thousand/uL      RBC 5 30 Million/uL      Hemoglobin 14 6 g/dL      Hematocrit 43 6 %      MCV 82 fL      MCH 27 5 pg      MCHC 33 5 g/dL      RDW 14 5 %      MPV 10 0 fL      Platelets 237 (L) Thousands/uL      nRBC 0 /100 WBCs      Neutrophils Relative 71 %      Lymphocytes Relative 22 %      Monocytes Relative 5 %      Eosinophils Relative 2 %      Basophils Relative 0 %      Neutrophils Absolute 4 06 Thousands/µL      Lymphocytes Absolute 1 24 Thousands/µL      Monocytes Absolute 0 29 Thousand/µL      Eosinophils Absolute 0 09 Thousand/µL      Basophils Absolute 0 02 Thousands/µL     POCT urinalysis dipstick [[id number]]     Lab Status:  No result Specimen:  Urine                  No orders to display              Procedures  Procedures       Phone Contacts  ED Phone Contact    ED Course  ED Course                                MDM  Number of Diagnoses or Management Options  Acute on chronic pancreatitis [facility]):      Amount and/or Complexity of Data Reviewed  Clinical lab tests: ordered and reviewed  Tests in the radiology section of CPT®: ordered and reviewed    Risk of Complications, Morbidity, and/or Mortality  Presenting problems: moderate  General comments: Pt  Was admitted for further work up / management      CritCare Time    Disposition  Final diagnoses:   Acute on chronic pancreatitis ([facility] )     Time reflects when diagnosis was documented in both MDM as applicable and the Disposition within this note     Time User Action Codes Description Comment    4/24/2018  1:16 PM Damien Do Add [K85 90,  K86 1] Acute on chronic pancreatitis ([facility] )     4/24/2018  3:11 PM Francia Dietrich Add [I82 891] Chronic thrombosis of splenic vein       ED Disposition     ED Disposition Condition Comment    Admit  Case was discussed with Dr Josette Brady and the patient's admission status was agreed to be observation/med/surg      Follow-up Information    None       Patient's Medications   Discharge Prescriptions    No medications on file     No discharge procedures on file      ED Provider  Electronically Signed by           Sydney Solis MD  04/24/18 5544

## 2018-04-24 NOTE — H&P
History and Physical - [facility]    Patient Information: Lupillo Enciso 42 y o  male MRN: [id number]  Unit/Bed#: ED 30 Encounter: [id number]  Admitting Physician: Isra Rothman MD  PCP: Benjamin Mead MD  Date of Admission:  04/24/18    Assessment/Plan:  26-year-old male patient with at least a 12 year history of recurrent pancreatitis now presents with acute on chronic pancreatitis with intractable abdominal pain  Etiology of initial course of pancreatitis was in relation to from familial hypertriglyceridemia  Other last several years patient is has gone numerous admissions in relation to recurrence of pancreatitis both at [facility] and at this facility  Most serious episode was in 2015 where at [facility] underwent pancreatic necrosectomy and midline laparotomy with total of 6 washouts over a period of at 1 month ICU admission  2016 E underwent an ERCP with pancreatic stent placement  Later complicated by fungemia  He underwent interventional radiology embolization of a chronically thrombosed splenic vein in 2016  His most recent admission however was here for recurrent pancreatitis that time panic triglyceride levels of 500 were noted initial presentation lipase of 800 done underwent a self-limited course  He presents now with a lipase of 1036  He has had intractable abdominal pain for last several days last food ingestion was some 8 hours ago  He takes oxycodone for pain relief and is on Creon 91777 units with each meal  His last CT scan performed 3/9/2018 showed attenuation of pancreatic tail but without acute inflammatory change with multiple rim calcified structures in the tail of the pancreas along with a splenic hilum  Again noted was chronic occlusion of the splenic vein with multiple venous collaterals    Hepatosplenomegaly unchanged    Hospital Problem List:     Principal Problem:    Acute on chronic pancreatitis [facility])  Active Problems:    Hypertension    Asthma Secondary pancreatic insufficiency    Chronic thrombosis of splenic vein    Gastric varices without bleeding    H/O portal hypertension    Familial hypertriglyceridemia      Plan for the Primary Problem(s):  · Pancreatitis will be treated with NPO status IV Dilaudid analgesia initially and will have GI consultation a would repeat serial lipase should there be continued up trend in lipase would repeat CT scan that was last performed in early March of this year  To assess for possible recurrence or enlarging pseudocyst and/or necrosis although does not appear septic  · Secondary pancreatic insufficiency will continue Creon  · Gastric varices were noted on EGD at [facility] without bleeding at that time in 2016 he has underlying portal hypertension in relation to chronic splenic vein thrombosis with status post IR embolization of splenic vein    Plan for Additional Problems:   · Continue previous antihypertensives  · Asthma continue Proventil p r n  · Familial hypertriglyceridemia will continue gemfibrozil obtain lipid panel  · Tobacco abuse smoking cessation discussed and placed on nicotine replacement therapy    VTE Prophylaxis: Enoxaparin (Lovenox)  / sequential compression device   Code Status:  Full  POLST: POLST form is not discussed and not completed at this time  Anticipated Length of Stay:  Patient will be admitted on an Observation basis with an anticipated length of stay of  less than 2 midnights  Justification for Hospital Stay:  Chronic pancreatitis    Total Time for Visit, including Counseling / Coordination of Care: 30 minutes  Greater than 50% of this total time spent on direct patient counseling and coordination of care      Chief Complaint:   Abdominal pain    History of Present Illness:    Clara Juarez is a 42 y o  male who presents with recurrent abdominal pain in similar locations prior in relation to known history of chronic pancreatitis for last several days has become intractable in spite of usage of usual dosage of oxycodone  Has been persistently nauseous but no emesis in last several hours  Review of Systems:    Review of Systems   Constitutional: Positive for appetite change  HENT: Negative  Eyes: Negative  Respiratory: Positive for shortness of breath  Cardiovascular: Negative  Gastrointestinal: Positive for abdominal distention, constipation and nausea  Endocrine: Negative  Genitourinary: Negative  Musculoskeletal: Negative  Allergic/Immunologic: Negative  Neurological: Negative  Hematological: Negative  Psychiatric/Behavioral: Positive for decreased concentration  The patient is nervous/anxious  Past Medical and Surgical History:     Past Medical History:   Diagnosis Date    Asthma     Chronic pancreatitis ([facility] )     Diabetes mellitus ([facility] )     Hypertension        Past Surgical History:   Procedure Laterality Date    ABDOMINAL SURGERY      FRACTURE SURGERY         Meds/Allergies:    Prior to Admission medications    Medication Sig Start Date End Date Taking?  Authorizing Provider   Albuterol (VENTOLIN IN) Inhale 90 mcg   Yes Historical Provider, MD   amLODIPine (NORVASC) 5 mg tablet Take 5 mg by mouth daily   Yes Historical Provider, MD   calcium carbonate-vitamin D (OSCAL-D) 500 mg-200 units per tablet Take 1 tablet by mouth daily with breakfast   Yes Historical Provider, MD   gemfibrozil (LOPID) 600 mg tablet Take 600 mg by mouth 2 (two) times a day before meals   Yes Historical Provider, MD   metoprolol tartrate (LOPRESSOR) 25 mg tablet Take 25 mg by mouth 2 (two) times a day   Yes Historical Provider, MD   nicotine (NICODERM CQ) 21 mg/24 hr TD 24 hr patch Place 1 patch on the skin daily for 30 days 10/28/16 4/24/18 Yes Chayito Pedraza MD   omeprazole (PriLOSEC) 40 MG capsule Take 40 mg by mouth daily   Yes Historical Provider, MD   oxyCODONE (ROXICODONE) 15 mg immediate release tablet Take 15 mg by mouth every 4 (four) hours as needed for moderate pain   Yes Historical Provider, MD   gabapentin (NEURONTIN) 300 mg capsule Take 600 mg by mouth 3 (three) times a day  4/24/18 Yes Historical Provider, MD   potassium chloride (K-DUR,KLOR-CON) 20 mEq tablet Take 20 mEq by mouth 2 (two) times a day  4/24/18 Yes Historical Provider, MD     I have reviewed home medications with patient personally  Allergies: No Known Allergies    Social History:     Marital Status: Single   Occupation:  Unclear  Patient Pre-hospital Living Situation:  Lives with son  Patient Pre-hospital Level of Mobility:  Ambulatory  Patient Pre-hospital Diet Restrictions:  None  Substance Use History:   History   Alcohol Use No     History   Smoking Status    Current Every Day Smoker    Packs/day: 3 00   Smokeless Tobacco    Never Used     History   Drug Use No       Family History:    non-contributory    Physical Exam:     Vitals:   Blood Pressure: (!) 170/107 (04/24/18 1509)  Pulse: 63 (04/24/18 1509)  Temperature: 97 9 °F (36 6 °C) (04/24/18 1047)  Temp Source: Oral (04/24/18 1047)  Respirations: 20 (04/24/18 1509)  Weight - Scale: 102 kg (224 lb) (04/24/18 1047)  SpO2: 93 % (04/24/18 1509)       General appearance: alert, appears stated age and cooperative  Head: Normocephalic, without obvious abnormality, atraumatic  Lungs: clear to auscultation bilaterally  Heart: regular rate and rhythm  Abdomen: Diffuse abdominal pain possibly more localized to the left upper and left flank on palpation with large midline scar from previous surgery and midline ventral hernia  Back: negative  Extremities: extremities normal, atraumatic, no cyanosis or edema  Neurologic: Grossly normal        Additional Data:     Lab Results: I have personally reviewed pertinent reports          Results from last 7 days  Lab Units 04/24/18  1134   WBC Thousand/uL 5 70   HEMOGLOBIN g/dL 14 6   HEMATOCRIT % 43 6   PLATELETS Thousands/uL 117*   NEUTROS PCT % 71   LYMPHS PCT % 22   MONOS PCT % 5   EOS PCT % 2 Results from last 7 days  Lab Units 04/24/18  1134   SODIUM mmol/L 137   POTASSIUM mmol/L 3 8   CHLORIDE mmol/L 98*   CO2 mmol/L 33*   BUN mg/dL 15   CREATININE mg/dL 0 79   CALCIUM mg/dL 9 1   TOTAL PROTEIN g/dL 8 0   BILIRUBIN TOTAL mg/dL 0 53   ALK PHOS U/L 92   ALT U/L 23   AST U/L 17   GLUCOSE RANDOM mg/dL 185*           Imaging: I have personally reviewed pertinent reports  No results found  EKG, Pathology, and Other Studies Reviewed on Admission:   · EKG:  None    Allscripts Records Reviewed: Yes     ** Please Note: Dragon 360 Dictation voice to text software may have been used in the creation of this document   **

## 2018-04-25 VITALS
WEIGHT: 224 LBS | BODY MASS INDEX: 32.07 KG/M2 | RESPIRATION RATE: 18 BRPM | SYSTOLIC BLOOD PRESSURE: 143 MMHG | DIASTOLIC BLOOD PRESSURE: 94 MMHG | HEART RATE: 65 BPM | HEIGHT: 70 IN | TEMPERATURE: 98.1 F | OXYGEN SATURATION: 92 %

## 2018-04-25 LAB
ALBUMIN SERPL BCP-MCNC: 4 G/DL (ref 3.5–5)
ALP SERPL-CCNC: 90 U/L (ref 46–116)
ALT SERPL W P-5'-P-CCNC: 25 U/L (ref 12–78)
ANION GAP SERPL CALCULATED.3IONS-SCNC: 9 MMOL/L (ref 4–13)
AST SERPL W P-5'-P-CCNC: 20 U/L (ref 5–45)
BILIRUB SERPL-MCNC: 0.42 MG/DL (ref 0.2–1)
BUN SERPL-MCNC: 9 MG/DL (ref 5–25)
CALCIUM SERPL-MCNC: 8.7 MG/DL (ref 8.3–10.1)
CHLORIDE SERPL-SCNC: 100 MMOL/L (ref 100–108)
CO2 SERPL-SCNC: 29 MMOL/L (ref 21–32)
CREAT SERPL-MCNC: 0.68 MG/DL (ref 0.6–1.3)
GFR SERPL CREATININE-BSD FRML MDRD: 120 ML/MIN/1.73SQ M
GLUCOSE P FAST SERPL-MCNC: 139 MG/DL (ref 65–99)
GLUCOSE SERPL-MCNC: 139 MG/DL (ref 65–140)
LIPASE SERPL-CCNC: 152 U/L (ref 73–393)
POTASSIUM SERPL-SCNC: 3.6 MMOL/L (ref 3.5–5.3)
PROT SERPL-MCNC: 7.3 G/DL (ref 6.4–8.2)
SODIUM SERPL-SCNC: 138 MMOL/L (ref 136–145)

## 2018-04-25 PROCEDURE — 83690 ASSAY OF LIPASE: CPT | Performed by: INTERNAL MEDICINE

## 2018-04-25 PROCEDURE — C9113 INJ PANTOPRAZOLE SODIUM, VIA: HCPCS | Performed by: INTERNAL MEDICINE

## 2018-04-25 PROCEDURE — 80053 COMPREHEN METABOLIC PANEL: CPT | Performed by: INTERNAL MEDICINE

## 2018-04-25 RX ADMIN — METOPROLOL TARTRATE 25 MG: 25 TABLET ORAL at 08:07

## 2018-04-25 RX ADMIN — ENOXAPARIN SODIUM 40 MG: 40 INJECTION SUBCUTANEOUS at 08:01

## 2018-04-25 RX ADMIN — GEMFIBROZIL 600 MG: 600 TABLET ORAL at 06:04

## 2018-04-25 RX ADMIN — SODIUM CHLORIDE 125 ML/HR: 0.9 INJECTION, SOLUTION INTRAVENOUS at 07:22

## 2018-04-25 RX ADMIN — PANTOPRAZOLE SODIUM 40 MG: 40 INJECTION, POWDER, FOR SOLUTION INTRAVENOUS at 08:01

## 2018-04-25 RX ADMIN — PANCRELIPASE 12000 UNITS: 30000; 6000; 19000 CAPSULE, DELAYED RELEASE PELLETS ORAL at 08:08

## 2018-04-25 RX ADMIN — OXYCODONE HYDROCHLORIDE 15 MG: 5 TABLET ORAL at 04:08

## 2018-04-25 RX ADMIN — SENNOSIDES 8.6 MG: 8.6 TABLET, FILM COATED ORAL at 08:07

## 2018-04-25 RX ADMIN — HYDROMORPHONE HYDROCHLORIDE 1 MG: 1 INJECTION, SOLUTION INTRAMUSCULAR; INTRAVENOUS; SUBCUTANEOUS at 01:46

## 2018-04-25 RX ADMIN — NICOTINE 1 PATCH: 14 PATCH TRANSDERMAL at 08:02

## 2018-04-25 RX ADMIN — HYDROMORPHONE HYDROCHLORIDE 1 MG: 1 INJECTION, SOLUTION INTRAMUSCULAR; INTRAVENOUS; SUBCUTANEOUS at 06:04

## 2018-04-25 RX ADMIN — AMLODIPINE BESYLATE 5 MG: 5 TABLET ORAL at 08:07

## 2018-04-25 NOTE — PROGRESS NOTES
When in room to assess patient this morning, patient difficult to arouse  Having a heard time staying awake answering questions  Vaishali Thomas from report that he had a rough night  Will hold off on narcotic pain medications for now  Fluids still running at 125/hr  Patient has 2 orders for Nicotine patch at the same time  Administered the 14mg; held the 21 mg  Refused Lovenox injection, however is compliant with sabi CHAMORROs

## 2018-04-25 NOTE — PLAN OF CARE
DISCHARGE PLANNING     Discharge to home or other facility with appropriate resources Progressing        GASTROINTESTINAL - ADULT     Minimal or absence of nausea and/or vomiting Progressing     Maintains adequate nutritional intake Progressing        PAIN - ADULT     Verbalizes/displays adequate comfort level or baseline comfort level Progressing        SAFETY ADULT     Patient will remain free of falls Progressing     Maintain or return to baseline ADL function Progressing     Maintain or return mobility status to optimal level Progressing

## 2018-04-25 NOTE — PROGRESS NOTES
Patient stated that he didn't want any more pain medication  He came into the hospital for stronger medicine  If all your going to give me is OXY, then I can take and manage my pain at home  Patient stated that he wanted to sign out AMA  Physician contacted

## 2018-04-25 NOTE — PROGRESS NOTES
Patient refused to sign the AMA paper  Stated he wanted to go out for a cigarette  Left via the elevator  Security contacted because patient still had IV in left arm  Security talked with patient outside of hospital  I met patient to take IV out  The patient was upset and after letting his talk, I explained that he was groggy this morning during morning assessment, which he admits to  He rang again for pain medication at 9:30 and was still groggy when entering the room and could barely keep his eyes open  When I returned again at his request, I arrived with his pain medication and you stated that you wanted to sign out AMA because what you really wanted was more dilauded  Patient was agreeable to come back upstairs to wait for the physician, but after IV was removed, patient stated he was leaving and left

## 2018-04-25 NOTE — CONSULTS
Patient MRN: [id number]  Date of Service: 4/25/2018  Referring Physician: Sunil Estrada MD  Provider Creating Note: Yevette Eisenmenger, PA-C  PCP: Julian Estrada  Reason for Consult:   HPI  Lori Alegria is a 42 y o  male who was admitted with Acute on chronic pancreatitis ([facility] )  He has a history of familial hypertriglyceridemia with recurrent pancreatitis  He has undergone 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  He had ERCP with pancreatic stenting in February 2016 with removal of the stent in March 2016  In May of 2016 he underwent GJ tube placement  He has a history gastric varices, it is unclear whether he has had esophageal varices  He denies any prior banding  He says he does occasionally vomit some blood and clots, this is rare  His last occurrence was a few months ago  He is on oxycodone 15 mg as an outpatient for control of abdominal pain  For the last 3 days he has had worsening abdominal pain with radiation into his back  He was taking 30 mg of oxycodone without relief  He went to [facility], but the emergency room wait was too long so he came to [address] [place] [address]  Currently the pain has improved, but not resolved completely  He has had some nausea but no vomiting here  Past Medical History:   Diagnosis Date    Asthma     Chronic pancreatitis ([facility] )     Diabetes mellitus ([facility] )     Hypertension      Past Surgical History:   Procedure Laterality Date    ABDOMINAL SURGERY      FRACTURE SURGERY       Medications  Home Medications:   Prior to Admission medications    Medication Sig Start Date End Date Taking?  Authorizing Provider   Albuterol (VENTOLIN IN) Inhale 90 mcg   Yes Historical Provider, MD   amLODIPine (NORVASC) 5 mg tablet Take 5 mg by mouth daily   Yes Historical Provider, MD   calcium carbonate-vitamin D (OSCAL-D) 500 mg-200 units per tablet Take 1 tablet by mouth daily with breakfast   Yes Historical Provider, MD   gemfibrozil (LOPID) 600 mg tablet Take 600 mg by mouth 2 (two) times a day before meals   Yes Historical Provider, MD   metoprolol tartrate (LOPRESSOR) 25 mg tablet Take 25 mg by mouth 2 (two) times a day   Yes Historical Provider, MD   nicotine (NICODERM CQ) 21 mg/24 hr TD 24 hr patch Place 1 patch on the skin daily for 30 days 10/28/16 4/24/18 Yes Taurus Siddiqui MD   omeprazole (PriLOSEC) 40 MG capsule Take 40 mg by mouth daily   Yes Historical Provider, MD   oxyCODONE (ROXICODONE) 15 mg immediate release tablet Take 15 mg by mouth every 4 (four) hours as needed for moderate pain   Yes Historical Provider, MD       Inhouse Medications    Current Facility-Administered Medications:     acetaminophen (TYLENOL) tablet 650 mg, 650 mg, Oral, Q6H PRN    amLODIPine (NORVASC) tablet 5 mg, 5 mg, Oral, Daily, 5 mg at 04/25/18 0807    calcium carbonate-vitamin D (OSCAL-D) 500 mg-200 units per tablet 1 tablet, 1 tablet, Oral, Daily With Breakfast    enoxaparin (LOVENOX) subcutaneous injection 40 mg, 40 mg, Subcutaneous, Daily, 40 mg at 04/25/18 0801    gemfibrozil (LOPID) tablet 600 mg, 600 mg, Oral, BID AC, 600 mg at 04/25/18 0604    HYDROmorphone (DILAUDID) injection 1 mg, 1 mg, Intravenous, Q3H PRN, 1 mg at 04/25/18 0604    metoprolol tartrate (LOPRESSOR) tablet 25 mg, 25 mg, Oral, BID, 25 mg at 04/25/18 0807    nicotine (NICODERM CQ) 14 mg/24hr TD 24 hr patch 1 patch, 1 patch, Transdermal, Daily, 1 patch at 04/25/18 0802    nicotine (NICODERM CQ) 21 mg/24 hr TD 24 hr patch 1 patch, 1 patch, Transdermal, Daily, 1 patch at 04/24/18 1353    ondansetron (ZOFRAN) injection 4 mg, 4 mg, Intravenous, Q6H PRN, 4 mg at 04/24/18 1626    oxyCODONE (ROXICODONE) IR tablet 15 mg, 15 mg, Oral, Q4H PRN, 15 mg at 04/25/18 0408    pancrelipase (Lip-Prot-Amyl) (CREON) delayed release capsule 12,000 Units, 12,000 Units, Oral, TID With Meals, 12,000 Units at 04/25/18 0808    pantoprazole (PROTONIX) injection 40 mg, 40 mg, Intravenous, Q12H [address], 40 mg at 04/25/18 0801    senna (SENOKOT) tablet 8 6 mg, 1 tablet, Oral, Daily, 8 6 mg at 04/25/18 0807    sodium chloride 0 9 % infusion, 125 mL/hr, Intravenous, Continuous, 125 mL/hr at 04/25/18 4783    Allergies  No Known Allergies  Social History   reports that he has been smoking  He has been smoking about 3 00 packs per day  He has never used smokeless tobacco  He reports that he does not drink alcohol or use drugs  Family History  History reviewed  No pertinent family history  No known family history of GI malignancy    ROS  ROS: Reports:  Abdominal pain, vomiting    Denies chest pain, shortness of breath, weight loss  All others negative except as noted in HPI  Objective   Vitals  /94 (BP Location: Left arm)   Pulse 65   Temp 98 1 °F (36 7 °C) (Tympanic)   Resp 18   Ht 5' 10" (1 778 m)   Wt 102 kg (224 lb)   SpO2 92%   BMI 32 14 kg/m²   General: Alert, in mild distress  Eyes:  No scleral icterus  ENT:  Mucous membranes moist  Card:  Regular rhythm without murmur  Lungs: Clear to ascultation b/l  No wheezes, rales, rhonchi  Abdomen:  Soft  Obese  Bowel sounds are present    Mildly diffusely tender  Skin:  No jaundice  Extremities:  No edema  Neuro: Alert and oriented x3    Laboratory Studies  Lab Results   Component Value Date    WBC 5 70 04/24/2018    HGB 14 6 04/24/2018    HCT 43 6 04/24/2018     (L) 04/24/2018    MCV 82 04/24/2018     Lab Results   Component Value Date    CREATININE 0 68 04/25/2018    BUN 9 04/25/2018     04/25/2018    K 3 6 04/25/2018     04/25/2018    CO2 29 04/25/2018    GLUCOSE 139 04/25/2018    CALCIUM 8 7 04/25/2018    PROT 7 3 04/25/2018    ALKPHOS 90 04/25/2018    BILITOT 0 42 04/25/2018    AST 20 04/25/2018    ALT 25 04/25/2018     Lab Results   Component Value Date    LIPASE 152 04/25/2018    LIPASE 1,035 (H) 04/24/2018    LIPASE 256 03/09/2018       Lab Results   Component Value Date    PROTIME 14 0 04/06/2014    INR 1 13 04/06/2014       Assessment and Plan:  1  Recurrent pancreatitis due to familial hypertriglyceridemia  Lipase now normal  Continue pain control  Will discuss possible clears  2   History of gastric varices and report of hematemesis at times  Will discuss possible EGD  3  Remote history of DVT/PE  4    Chronic occlusion of splenic vein with multiple venous collaterals and hepatosplenomegaly    Principal Problem:    Acute on chronic pancreatitis (HCC)  Active Problems:    Hypertension    Asthma    Secondary pancreatic insufficiency    Chronic thrombosis of splenic vein    Gastric varices without bleeding    H/O portal hypertension    Familial hypertriglyceridemia      Nimo Gallo PA-C

## 2018-04-25 NOTE — CASE MANAGEMENT
Initial Clinical Review    Admission: Date/Time/Statement:  OBS 4/24 @ 1315    Orders Placed This Encounter   Procedures    Place in Observation (expected length of stay for this patient is less than two midnights)     Standing Status:   Standing     Number of Occurrences:   1     Order Specific Question:   Admitting Physician     Answer:   Brian Roe     Order Specific Question:   Level of Care     Answer:   Med Surg [16]       ED: Date/Time/Mode of Arrival:   ED Arrival Information     Expected Arrival Acuity Means of Arrival Escorted By Service Admission Type    - 4/24/2018 10:44 Urgent Walk-In Self General Medicine Urgent    Arrival Complaint    Back Pain, Abdominal Pain        Chief Complaint:   Chief Complaint   Patient presents with    Abdominal Pain     pt c/o left sided abdominal pain radiating to back that started2 days ago  hx pancreatitis  c/o nausea and vomiting  denies fevers  History of Illness: C/o mid abd  Pain radiating to the back for 2-3 days, constant  Similar to his chronic pancreatitis  He is  Taking oxycodone for pain and it's not helping  +n/v - taking zofran at home without relief  No diarrhea, no fevers, no dysuria, no hematuria      Abdominal: Soft  Mid and lower abd   Tenderness, +guarding     ED Vital Signs:   ED Triage Vitals [04/24/18 1047]   Temperature Pulse Respirations Blood Pressure SpO2   97 9 °F (36 6 °C) 73 18 129/67 94 %      Temp Source Heart Rate Source Patient Position - Orthostatic VS BP Location FiO2 (%)   Oral Monitor Sitting Right arm --      Pain Score       Worst Possible Pain        Wt Readings from Last 1 Encounters:   04/24/18 102 kg (224 lb)       Abnormal Labs/Diagnostic Test Results:    Lipase 1,035  Cl 98,   co2  33,   Glu 185  Plats 117  Trigl;ycerires 489,   HDL 27    ED Treatment:   Medication Administration from 04/24/2018 1044 to 04/24/2018 1743       Date/Time Order Dose Route Action Action by Comments     04/24/2018 0817 sodium chloride 0 9 % bolus 1,000 mL 0 mL Intravenous Stopped Giana Graham RN      04/24/2018 1135 sodium chloride 0 9 % bolus 1,000 mL 1,000 mL Intravenous New Bag Giana Graham RN      04/24/2018 1137 ondansetron (ZOFRAN) injection 4 mg 4 mg Intravenous Given Giana Graham RN      04/24/2018 1137 HYDROmorphone (DILAUDID) injection 1 mg 1 mg Intravenous Given Giana Graham RN      04/24/2018 1354 HYDROmorphone (DILAUDID) injection 1 mg 1 mg Intravenous Given Giana Graham RN      04/24/2018 1352 amLODIPine (NORVASC) tablet 5 mg 5 mg Oral Given Giana Graham RN      04/24/2018 1604 gemfibrozil (LOPID) tablet 600 mg 600 mg Oral Given Giana Graham RN      04/24/2018 1541 gemfibrozil (LOPID) tablet 600 mg 0 mg Oral Hold Giana Graham RN Med requested from pharmacy  04/24/2018 1353 nicotine (NICODERM CQ) 21 mg/24 hr TD 24 hr patch 1 patch 1 patch Transdermal Medication Applied Giana Graham RN      04/24/2018 1542 HYDROmorphone (DILAUDID) injection 1 mg 1 mg Intravenous Given Giana Graham RN      04/24/2018 1540 sodium chloride 0 9 % infusion 125 mL/hr Intravenous New [facility]      04/24/2018 1530 senna (SENOKOT) tablet 8 6 mg 8 6 mg Oral Not Given Giana Graham RN      04/24/2018 1626 ondansetron (ZOFRAN) injection 4 mg 4 mg Intravenous Given Giana Graham RN      04/24/2018 1530 nicotine (NICODERM CQ) 14 mg/24hr TD 24 hr patch 1 patch 0 patch Transdermal Hold Giana Graham RN Patient already has patch previously placed       04/24/2018 1536 enoxaparin (LOVENOX) subcutaneous injection 40 mg 40 mg Subcutaneous Not Given Giana Graham RN      04/24/2018 1604 pancrelipase (Lip-Prot-Amyl) (CREON) delayed release capsule 12,000 Units 12,000 Units Oral Given Giana Graham, ALEX      04/24/2018 1539 pancrelipase (Lip-Prot-Amyl) (CREON) delayed release capsule 12,000 Units 12,000 Units Oral Not Given Jaymie Díaz RN Requested from pharmacy  Past Medical/Surgical History: Active Ambulatory Problems     Diagnosis Date Noted    Chronic pancreatitis ([facility] [address] ) 10/28/2016    Hypertension 10/28/2016    Asthma 10/28/2016     Resolved Ambulatory Problems     Diagnosis Date Noted    No Resolved Ambulatory Problems     Past Medical History:   Diagnosis Date    Asthma     Chronic pancreatitis ([facility] [address] )     Diabetes mellitus ([facility] [address] )     Hypertension        Admitting Diagnosis: Abdominal pain [R10 9]  Acute on chronic pancreatitis (HCC) [K85 90, K86 1]  Chronic thrombosis of splenic vein [I82 891]    Age/Sex:  49-year-old male patient   with at least a 16 year history of recurrent pancreatitis now presents with acute on chronic pancreatitis with intractable abdominal pain  Etiology of initial course of pancreatitis was in relation to from familial hypertriglyceridemia  Other last several years patient is has gone numerous admissions in relation to recurrence of pancreatitis both at [facility] and at this facility  Most serious episode was in 2015 where at [facility] underwent pancreatic necrosectomy and midline laparotomy with total of 6 washouts over a period of at 1 month ICU admission  2016 E underwent an ERCP with pancreatic stent placement  Later complicated by fungemia  He underwent interventional radiology embolization of a chronically thrombosed splenic vein in 2016  His most recent admission however was here for recurrent pancreatitis that time panic triglyceride levels of 500 were noted initial presentation lipase of 800 done underwent a self-limited course  He presents now with a lipase of 1036  He has had intractable abdominal pain for last several days last food ingestion was some 8 hours ago    He takes oxycodone for pain relief and is on Creon 00662 units with each meal  His last CT scan performed 3/9/2018 showed attenuation of pancreatic tail but without acute inflammatory change with multiple rim calcified structures in the tail of the pancreas along with a splenic hilum  Again noted was chronic occlusion of the splenic vein with multiple venous collaterals  Hepatosplenomegaly unchanged    Assessment/Plan:   Principal Problem:    Acute on chronic pancreatitis (HCC)  Active Problems:    Hypertension    Asthma    Secondary pancreatic insufficiency    Chronic thrombosis of splenic vein    Gastric varices without bleeding    H/O portal hypertension    Familial hypertriglyceridemia     Plan for the Primary Problem(s):  · Pancreatitis will be treated with NPO status IV Dilaudid analgesia initially and will have GI consultation a would repeat serial lipase should there be continued up trend in lipase would repeat CT scan that was last performed in early March of this year  To assess for possible recurrence or enlarging pseudocyst and/or necrosis although does not appear septic  ? Secondary pancreatic insufficiency will continue Creon  ? Gastric varices were noted on EGD at [facility] without bleeding at that time in 2016 he has underlying portal hypertension in relation to chronic splenic vein thrombosis with status post IR embolization of splenic vein     Plan for Additional Problems:   · Continue previous antihypertensives  · Asthma continue Proventil p r n  · Familial hypertriglyceridemia will continue gemfibrozil obtain lipid panel  · Tobacco abuse smoking cessation discussed and placed on nicotine replacement therapy     VTE Prophylaxis: Enoxaparin (Lovenox)  / sequential compression device   Code Status:  Full  POLST: POLST form is not discussed and not completed at this time  Anticipated Length of Stay:  Patient will be admitted on an Observation basis with an anticipated length of stay of  less than 2 midnights     Justification for Hospital Stay:  Chronic pancreatitis     Admission Orders:  OBS    NPO  OOB as tolerated  SCD's  Consult GI  CMP, lipase  Scheduled Meds:   Current Facility-Administered Medications:          amLODIPine 5 mg Oral Daily     calcium carbonate-vitamin D 1 tablet Oral Daily With Breakfast     enoxaparin 40 mg Subcutaneous Daily     gemfibrozil 600 mg Oral BID AC             metoprolol tartrate 25 mg Oral BID     nicotine 1 patch Transdermal Daily     nicotine 1 patch Transdermal Daily                     pancrelipase (Lip-Prot-Amyl) 12,000 Units Oral TID With Meals     pantoprazole 40 mg Intravenous Q12H [facility]     senna 1 tablet Oral Daily     sodium chloride 125 mL/hr Intravenous Continuous  Last Rate: 125 mL/hr (04/25/18 0722)     Continuous Infusions:   sodium chloride 125 mL/hr Last Rate: 125 mL/hr (04/25/18 0722)     PRN Meds:   acetaminophen    HYDROmorphone IV x 3  And  X 2  4/25    Ondansetron IV X 1    oxyCODONE x 2  And x 1  4/25 4/25: 98 1 - 65 - 18   143/94      Thank you,  [facility] in the [facility] by Marito Garcia for 2017  Network Utilization Review Department  Phone: [phone]; Fax [phone]  ATTENTION: The Network Utilization Review Department is now centralized for our 7 Facilities  Make a note that we have a new phone and fax numbers for our Department  Please call with any questions or concerns to [phone] and carefully follow the prompts so that you are directed to the right person  All voicemails are confidential  Fax any determinations, approvals, denials, and requests for initial or continue stay review clinical to [phone]  Due to HIGH CALL volume, it would be easier if you could please send faxed requests to expedite your requests and in part, help us provide discharge notifications faster

## 2018-08-19 NOTE — ED NOTES
Delivery Note    Katherine Bucio is a 24 year old now  female post-delivery at 40w1d.  Pregnancy was significant for uneventful prenatal course. Patient had a  over an intact perineum.    Mother's Information    Labor Length    1st stage:  6h 36m  2nd stage:  0h 06m  3rd stage:  0h 04m     Delivery (Maternal)    Episiotomy:  None  Perineal lacerations:  None  Vaginal laceration:  No  Cervical laceration:  No  Vaginal delivery est. blood loss (mL):  300  Repair suture:  None  Number of repair packets:  0     IO Blood Loss  18 2200 - 18 0700    Vaginal delivery EBL (mL) Hospital Encounter 300    Total  300         Eric Bucio [[id number]]    Delivery ([place])    Birth date/time:  2020  Delivery type:  Vaginal, Spontaneous Delivery     Labor Events     labor?:  No   steroids?:  None  Antibiotics during labor?:  No  Rupture date/time:  20188  Rupture type:  Spontaneous  Fluid color:  Clear  Fluid odor:  Normal  Augmentation:  None  Labor/Delivery complications:  None     Anesthesia    Method:  None     Assisted Delivery Details:    Forceps attempted?:  No  Vacuum extractor attempted?:  No     Shoulder Dystocia    Shoulder dystocia present?:  No     Delivery Procedures    Procedures:  None     Presentation and Position    Presentation:  Vertex  Position:  Left Occiput Anterior     Placenta    Date and time:  2018  4:46 AM  Removal:  Spontaneous  Appearance:  Intact  Placenta disposition:  discarded     Cord    Vessels:  3 Vessels  Complications:  Nuchal  Nuchal Intervention:  reduced  Nuchal Cord Description:  loose nuchal cord  Number of Loops:  1  Delayed Cord Clamping?:  Yes  Cord Clamped Date/Time:  2018 0443  Cord Blood Disposition:  Lab  Gases Sent?:  No  Stem Cell Collection (by provider):  No     [place] Assessment    Living status:  Living  Apgars:     1 Minute:   5 Minute:   10 Minute:   15 Minute:   20 Minute:     Skin Color:   1  1      Medication list reviewed with patient at this time        Sylvester Patel RN  04/24/18 1100   Heart Rate:   2  2       Reflex Irritability:   2  2       Muscle Tone:   2  2       Respiratory Effort:   2  2       Total:   9  9               Apgars Assigned By:  KUSH ANDERSON     Resuscitation    Method:  None     [place] Measurements    Weight:  3756 g  Length:  55.9 cm  Head circumference:  34.5 cm     Delivery Providers    Delivering clinician:  Aylin Valentine MD   Provider Role    Oly Neville RN Delivery Nurse    Kush Zaldivar, MONICA Baby Nurse    Cindy Bean Tech               Review the Delivery Report for details       Aylin Valentine MD

## 2018-10-11 ENCOUNTER — HOSPITAL ENCOUNTER (EMERGENCY)
Facility: HOSPITAL | Age: 38
Discharge: HOME | End: 2018-10-11
Attending: EMERGENCY MEDICINE | Admitting: EMERGENCY MEDICINE
Payer: COMMERCIAL

## 2018-10-11 ENCOUNTER — APPOINTMENT (EMERGENCY)
Dept: CT IMAGING | Facility: HOSPITAL | Age: 38
End: 2018-10-11
Payer: COMMERCIAL

## 2018-10-11 VITALS
DIASTOLIC BLOOD PRESSURE: 95 MMHG | BODY MASS INDEX: 31.28 KG/M2 | SYSTOLIC BLOOD PRESSURE: 136 MMHG | RESPIRATION RATE: 16 BRPM | HEART RATE: 74 BPM | OXYGEN SATURATION: 98 % | TEMPERATURE: 97.9 F | WEIGHT: 218 LBS

## 2018-10-11 VITALS
TEMPERATURE: 97.4 F | DIASTOLIC BLOOD PRESSURE: 103 MMHG | BODY MASS INDEX: 31.28 KG/M2 | OXYGEN SATURATION: 95 % | SYSTOLIC BLOOD PRESSURE: 131 MMHG | WEIGHT: 218 LBS | RESPIRATION RATE: 18 BRPM | HEART RATE: 89 BPM

## 2018-10-11 DIAGNOSIS — S13.9XXA ACUTE SPRAIN OF LIGAMENT OF NECK, INITIAL ENCOUNTER: ICD-10-CM
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
## 2018-10-11 DIAGNOSIS — S20.212A CHEST WALL CONTUSION, LEFT, INITIAL ENCOUNTER: ICD-10-CM

## 2018-10-11 DIAGNOSIS — S30.1XXA CONTUSION OF ABDOMINAL WALL, INITIAL ENCOUNTER: ICD-10-CM

## 2018-10-11 DIAGNOSIS — S09.90XA CLOSED HEAD INJURY, INITIAL ENCOUNTER: ICD-10-CM

## 2018-10-11 DIAGNOSIS — S05.11XA: Primary | ICD-10-CM

## 2018-10-11 DIAGNOSIS — S29.019A STRAIN OF THORACIC REGION, INITIAL ENCOUNTER: ICD-10-CM

## 2018-10-11 PROCEDURE — 99284 EMERGENCY DEPT VISIT MOD MDM: CPT

## 2018-10-11 PROCEDURE — 74176 CT ABD & PELVIS W/O CONTRAST: CPT

## 2018-10-11 PROCEDURE — 72125 CT NECK SPINE W/O DYE: CPT

## 2018-10-11 PROCEDURE — 70450 CT HEAD/BRAIN W/O DYE: CPT

## 2018-10-11 PROCEDURE — 70486 CT MAXILLOFACIAL W/O DYE: CPT

## 2018-10-11 PROCEDURE — 71250 CT THORAX DX C-: CPT

## 2018-10-11 RX ORDER — ACETAMINOPHEN 325 MG/1
975 TABLET ORAL ONCE
Status: COMPLETED | OUTPATIENT
Start: 2018-10-11 | End: 2018-10-11

## 2018-10-11 RX ORDER — METHOCARBAMOL 500 MG/1
1000 TABLET, FILM COATED ORAL ONCE
Status: COMPLETED | OUTPATIENT
Start: 2018-10-11 | End: 2018-10-11

## 2018-10-11 RX ORDER — LIDOCAINE 50 MG/G
2 PATCH TOPICAL EVERY 24 HOURS
Qty: 30 PATCH | Refills: 0 | Status: SHIPPED | OUTPATIENT
Start: 2018-10-11 | End: 2018-11-09

## 2018-10-11 RX ORDER — NAPROXEN 250 MG/1
500 TABLET ORAL ONCE
Status: DISCONTINUED | OUTPATIENT
Start: 2018-10-11 | End: 2018-10-11

## 2018-10-11 RX ORDER — TRAMADOL HYDROCHLORIDE 50 MG/1
50 TABLET ORAL ONCE
Status: COMPLETED | OUTPATIENT
Start: 2018-10-11 | End: 2018-10-11

## 2018-10-11 RX ORDER — TRAMADOL HYDROCHLORIDE 50 MG/1
50 TABLET ORAL EVERY 6 HOURS PRN
Qty: 20 TABLET | Refills: 0 | Status: SHIPPED | OUTPATIENT
Start: 2018-10-11 | End: 2018-10-17

## 2018-10-11 RX ORDER — METHOCARBAMOL 500 MG/1
1000 TABLET, FILM COATED ORAL EVERY 8 HOURS PRN
Qty: 30 TABLET | Refills: 0 | Status: SHIPPED | OUTPATIENT
Start: 2018-10-11 | End: 2020-01-04

## 2018-10-11 RX ORDER — LIDOCAINE 50 MG/G
2 PATCH TOPICAL ONCE
Status: DISCONTINUED | OUTPATIENT
Start: 2018-10-11 | End: 2018-10-12 | Stop reason: HOSPADM

## 2018-10-11 RX ADMIN — METHOCARBAMOL 1000 MG: 500 TABLET ORAL at 21:46

## 2018-10-11 RX ADMIN — ACETAMINOPHEN 975 MG: 325 TABLET, FILM COATED ORAL at 12:38

## 2018-10-11 RX ADMIN — TRAMADOL HYDROCHLORIDE 50 MG: 50 TABLET, FILM COATED ORAL at 21:46

## 2018-10-11 RX ADMIN — LIDOCAINE 2 PATCH: 50 PATCH TOPICAL at 21:46
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
## 2018-10-17 ENCOUNTER — HOSPITAL ENCOUNTER (EMERGENCY)
Facility: HOSPITAL | Age: 38
Discharge: HOME | End: 2018-10-17
Attending: EMERGENCY MEDICINE
Payer: COMMERCIAL

## 2018-10-17 VITALS
SYSTOLIC BLOOD PRESSURE: 117 MMHG | DIASTOLIC BLOOD PRESSURE: 82 MMHG | BODY MASS INDEX: 31 KG/M2 | RESPIRATION RATE: 17 BRPM | WEIGHT: 216.05 LBS | OXYGEN SATURATION: 95 % | TEMPERATURE: 98.4 F | HEART RATE: 68 BPM

## 2018-10-17 DIAGNOSIS — M25.561 KNEE PAIN, BILATERAL: ICD-10-CM

## 2018-10-17 DIAGNOSIS — M25.562 KNEE PAIN, BILATERAL: ICD-10-CM

## 2018-10-17 DIAGNOSIS — R10.9 RIGHT FLANK PAIN: Primary | ICD-10-CM

## 2018-10-17 PROCEDURE — 99283 EMERGENCY DEPT VISIT LOW MDM: CPT

## 2018-10-17 RX ORDER — NAPROXEN 500 MG/1
250 TABLET ORAL 2 TIMES DAILY WITH MEALS
COMMUNITY
End: 2018-11-09

## 2018-10-17 RX ORDER — LIDOCAINE 50 MG/G
1 PATCH TOPICAL ONCE
Status: DISCONTINUED | OUTPATIENT
Start: 2018-10-17 | End: 2018-10-17 | Stop reason: HOSPADM

## 2018-10-17 RX ORDER — ACETAMINOPHEN 325 MG/1
650 TABLET ORAL EVERY 6 HOURS PRN
COMMUNITY
End: 2019-07-24

## 2018-10-17 RX ADMIN — LIDOCAINE 1 PATCH: 50 PATCH TOPICAL at 09:27

## 2018-10-17 NOTE — ED NOTES
Ace wraps provided to patient for comfort related to knee pain  Dr Janina Gallagher at bedside discussing additional discharge instructions        Harshad Lewis RN  10/17/18 3590
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
## 2018-10-17 NOTE — DISCHARGE INSTRUCTIONS
Flank Pain   WHAT YOU NEED TO KNOW:   Flank pain is felt in the area below your ribcage and above your hip bones, often in the lower back  Your pain may be dull or so severe that you cannot get comfortable  The pain may stay in one area or radiate to another area  It may worsen and lighten in waves  Flank pain is often a sign of problems with your urinary tract, such as a kidney stone or infection  DISCHARGE INSTRUCTIONS:   Return to the emergency department if:   · You have a fever  · Your heart is fluttering or jumping  · You see blood in your urine  · Your pain radiates into your lower abdomen and genital area  · You have intense pain in your low back next to your spine  · You are much more tired than usual and have no desire to eat  · You have a headache and your muscles jerk  Contact your healthcare provider if:   · You have an upset stomach and are vomiting  · You have to urinate more often, and with urgency  · Your pain worsens or does not improve, and you cannot get comfortable  · You pass a stone when you urinate  · You have questions or concerns about your condition or care  Medicines: The following medicines may be ordered for you:  · Pain medicine  may help decrease or relieve your pain  Do not wait until the pain is severe before you take your medicine  · Antibiotics  may help treat a urinary tract infection caused by bacteria  · Take your medicine as directed  Contact your healthcare provider if you think your medicine is not helping or if you have side effects  Tell him of her if you are allergic to any medicine  Keep a list of the medicines, vitamins, and herbs you take  Include the amounts, and when and why you take them  Bring the list or the pill bottles to follow-up visits  Carry your medicine list with you in case of an emergency    Follow up with your healthcare provider in 1 to 2 days or as directed:  Write down your questions so you remember to ask them during your visits  © 2017 [address] Information is for End User's use only and may not be sold, redistributed or otherwise used for commercial purposes  All illustrations and images included in CareNotes® are the copyrighted property of [employer]  or Marito Garcia  The above information is an  only  It is not intended as medical advice for individual conditions or treatments  Talk to your doctor, nurse or pharmacist before following any medical regimen to see if it is safe and effective for you  Knee Pain   WHAT YOU NEED TO KNOW:   Knee pain may start suddenly, or it may be a long-term problem  You may have pain on the side, front, or back of your knee  You may have knee stiffness and swelling  You may hear popping sounds or feel like your knee is giving way or locking up as you walk  You may feel pain when you sit, stand, walk, or climb up and down stairs  Knee pain can be caused by conditions such as obesity, inflammation, or strains or tears in ligaments or tendons  DISCHARGE INSTRUCTIONS:   Follow up with your healthcare provider within 24 hours or as directed: You may need follow-up treatments, such as steroid injections to decrease pain  Write down your questions so you remember to ask them during your visits  Self-care:   · Rest  your knee so it can heal  Limit activities that increase your pain  · Ice  can help reduce swelling  Wrap ice in a towel and put it on your knee for as long and as often as directed  · Compression  with a brace or bandage can help reduce swelling  Use a brace or bandage only as directed  · Elevation  helps decrease pain and swelling  Elevate your knee while you are sitting or lying down  Prop your leg on pillows to keep your knee above the level of your heart  Medicines:   · NSAIDs  help decrease swelling and pain or fever  This medicine is available with or without a doctor's order   NSAIDs can cause stomach bleeding or kidney problems in certain people  If you take blood thinner medicine, always ask your healthcare provider if NSAIDs are safe for you  Always read the medicine label and follow directions  · Acetaminophen  decreases pain and fever  It is available without a doctor's order  Ask how much to take and when to take it  Follow directions  Acetaminophen can cause liver damage if not taken correctly  · Take your medicine as directed  Contact your healthcare provider if you think your medicine is not helping or if you have side effects  Tell him or her if you are allergic to any medicine  Keep a list of the medicines, vitamins, and herbs you take  Include the amounts, and when and why you take them  Bring the list or the pill bottles to follow-up visits  Carry your medicine list with you in case of an emergency  Exercise as directed: You may need to see a physical therapist or do recommended exercises to improve movement and decrease your pain  You may be directed to walk, swim, or ride a bike  Follow your exercise plan exactly as directed to avoid further injury  Contact your healthcare provider if:   · You have questions or concerns about your condition or care  Return to the emergency department if:   · Your pain is worse, even after treatment  · You cannot bend or straighten your leg completely  · The swelling around your knee does not go down even with treatment  · Your knee is painful and hot to the touch  © 2017 [address] Information is for End User's use only and may not be sold, redistributed or otherwise used for commercial purposes  All illustrations and images included in CareNotes® are the copyrighted property of A D [employer] M , Inc  or Marito Garcia  The above information is an  only  It is not intended as medical advice for individual conditions or treatments   Talk to your doctor, nurse or pharmacist before following any medical regimen to see if it is safe and effective for you

## 2018-11-09 ENCOUNTER — APPOINTMENT (EMERGENCY)
Dept: CT IMAGING | Facility: HOSPITAL | Age: 38
End: 2018-11-09
Payer: MEDICARE

## 2018-11-09 ENCOUNTER — HOSPITAL ENCOUNTER (EMERGENCY)
Facility: HOSPITAL | Age: 38
Discharge: HOME | End: 2018-11-09
Attending: EMERGENCY MEDICINE | Admitting: EMERGENCY MEDICINE
Payer: MEDICARE

## 2018-11-09 VITALS
OXYGEN SATURATION: 96 % | SYSTOLIC BLOOD PRESSURE: 121 MMHG | HEART RATE: 69 BPM | DIASTOLIC BLOOD PRESSURE: 83 MMHG | TEMPERATURE: 98.2 F | RESPIRATION RATE: 16 BRPM

## 2018-11-09 DIAGNOSIS — R10.9 ABDOMINAL PAIN: Primary | ICD-10-CM

## 2018-11-09 LAB
ALBUMIN SERPL BCP-MCNC: 4 G/DL (ref 3.5–5)
ALP SERPL-CCNC: 92 U/L (ref 46–116)
ALT SERPL W P-5'-P-CCNC: 24 U/L (ref 12–78)
ANION GAP SERPL CALCULATED.3IONS-SCNC: 9 MMOL/L (ref 4–13)
AST SERPL W P-5'-P-CCNC: 15 U/L (ref 5–45)
BACTERIA UR QL AUTO: ABNORMAL /HPF
BASOPHILS # BLD AUTO: 0.04 THOUSANDS/ΜL (ref 0–0.1)
BASOPHILS NFR BLD AUTO: 1 % (ref 0–1)
BILIRUB SERPL-MCNC: 0.44 MG/DL (ref 0.2–1)
BILIRUB UR QL STRIP: ABNORMAL
BUN SERPL-MCNC: 17 MG/DL (ref 5–25)
CALCIUM SERPL-MCNC: 9.3 MG/DL (ref 8.3–10.1)
CHLORIDE SERPL-SCNC: 100 MMOL/L (ref 100–108)
CLARITY UR: CLEAR
CO2 SERPL-SCNC: 29 MMOL/L (ref 21–32)
COLOR UR: YELLOW
CREAT SERPL-MCNC: 0.86 MG/DL (ref 0.6–1.3)
EOSINOPHIL # BLD AUTO: 0.07 THOUSAND/ΜL (ref 0–0.61)
EOSINOPHIL NFR BLD AUTO: 1 % (ref 0–6)
ERYTHROCYTE [DISTWIDTH] IN BLOOD BY AUTOMATED COUNT: 13.6 % (ref 11.6–15.1)
GFR SERPL CREATININE-BSD FRML MDRD: 108 ML/MIN/1.73SQ M
GLUCOSE SERPL-MCNC: 176 MG/DL (ref 65–140)
GLUCOSE UR STRIP-MCNC: NEGATIVE MG/DL
HCT VFR BLD AUTO: 46.1 % (ref 36.5–49.3)
HGB BLD-MCNC: 15.3 G/DL (ref 12–17)
HGB UR QL STRIP.AUTO: ABNORMAL
IMM GRANULOCYTES # BLD AUTO: 0.02 THOUSAND/UL (ref 0–0.2)
IMM GRANULOCYTES NFR BLD AUTO: 0 % (ref 0–2)
KETONES UR STRIP-MCNC: NEGATIVE MG/DL
LEUKOCYTE ESTERASE UR QL STRIP: NEGATIVE
LIPASE SERPL-CCNC: 229 U/L (ref 73–393)
LYMPHOCYTES # BLD AUTO: 1.26 THOUSANDS/ΜL (ref 0.6–4.47)
LYMPHOCYTES NFR BLD AUTO: 24 % (ref 14–44)
MCH RBC QN AUTO: 26.7 PG (ref 26.8–34.3)
MCHC RBC AUTO-ENTMCNC: 33.2 G/DL (ref 31.4–37.4)
MCV RBC AUTO: 81 FL (ref 82–98)
MONOCYTES # BLD AUTO: 0.32 THOUSAND/ΜL (ref 0.17–1.22)
MONOCYTES NFR BLD AUTO: 6 % (ref 4–12)
NEUTROPHILS # BLD AUTO: 3.57 THOUSANDS/ΜL (ref 1.85–7.62)
NEUTS SEG NFR BLD AUTO: 68 % (ref 43–75)
NITRITE UR QL STRIP: NEGATIVE
NON-SQ EPI CELLS URNS QL MICRO: ABNORMAL /HPF
NRBC BLD AUTO-RTO: 0 /100 WBCS
PH UR STRIP.AUTO: 5.5 [PH] (ref 4.5–8)
PLATELET # BLD AUTO: 154 THOUSANDS/UL (ref 149–390)
PMV BLD AUTO: 9.3 FL (ref 8.9–12.7)
POTASSIUM SERPL-SCNC: 3.6 MMOL/L (ref 3.5–5.3)
PROT SERPL-MCNC: 7.8 G/DL (ref 6.4–8.2)
PROT UR STRIP-MCNC: ABNORMAL MG/DL
RBC # BLD AUTO: 5.72 MILLION/UL (ref 3.88–5.62)
RBC #/AREA URNS AUTO: ABNORMAL /HPF
SODIUM SERPL-SCNC: 138 MMOL/L (ref 136–145)
SP GR UR STRIP.AUTO: >=1.03 (ref 1–1.03)
UROBILINOGEN UR QL STRIP.AUTO: 0.2 E.U./DL
WBC # BLD AUTO: 5.28 THOUSAND/UL (ref 4.31–10.16)
WBC #/AREA URNS AUTO: ABNORMAL /HPF

## 2018-11-09 PROCEDURE — 96376 TX/PRO/DX INJ SAME DRUG ADON: CPT

## 2018-11-09 PROCEDURE — 36415 COLL VENOUS BLD VENIPUNCTURE: CPT | Performed by: PHYSICIAN ASSISTANT

## 2018-11-09 PROCEDURE — 74177 CT ABD & PELVIS W/CONTRAST: CPT

## 2018-11-09 PROCEDURE — 99284 EMERGENCY DEPT VISIT MOD MDM: CPT

## 2018-11-09 PROCEDURE — 96374 THER/PROPH/DIAG INJ IV PUSH: CPT

## 2018-11-09 PROCEDURE — 81001 URINALYSIS AUTO W/SCOPE: CPT

## 2018-11-09 PROCEDURE — 96361 HYDRATE IV INFUSION ADD-ON: CPT

## 2018-11-09 PROCEDURE — 83690 ASSAY OF LIPASE: CPT | Performed by: PHYSICIAN ASSISTANT

## 2018-11-09 PROCEDURE — 80053 COMPREHEN METABOLIC PANEL: CPT | Performed by: PHYSICIAN ASSISTANT

## 2018-11-09 PROCEDURE — 85025 COMPLETE CBC W/AUTO DIFF WBC: CPT | Performed by: PHYSICIAN ASSISTANT

## 2018-11-09 RX ORDER — NAPROXEN 500 MG/1
500 TABLET ORAL 2 TIMES DAILY WITH MEALS
Qty: 30 TABLET | Refills: 0 | Status: SHIPPED | OUTPATIENT
Start: 2018-11-09 | End: 2019-03-03 | Stop reason: HOSPADM

## 2018-11-09 RX ORDER — MORPHINE SULFATE 4 MG/ML
4 INJECTION, SOLUTION INTRAMUSCULAR; INTRAVENOUS ONCE
Status: COMPLETED | OUTPATIENT
Start: 2018-11-09 | End: 2018-11-09

## 2018-11-09 RX ADMIN — IOHEXOL 100 ML: 350 INJECTION, SOLUTION INTRAVENOUS at 14:13

## 2018-11-09 RX ADMIN — MORPHINE SULFATE 2 MG: 2 INJECTION, SOLUTION INTRAMUSCULAR; INTRAVENOUS at 14:25

## 2018-11-09 RX ADMIN — SODIUM CHLORIDE 1000 ML: 0.9 INJECTION, SOLUTION INTRAVENOUS at 12:47

## 2018-11-09 RX ADMIN — MORPHINE SULFATE 4 MG: 4 INJECTION INTRAVENOUS at 12:48

## 2018-11-09 NOTE — DISCHARGE INSTRUCTIONS
Abdominal Pain   WHAT YOU NEED TO KNOW:   Abdominal pain can be dull, achy, or sharp  You may have pain in one area of your abdomen, or in your entire abdomen  Your pain may be caused by a condition such as constipation, food sensitivity or poisoning, infection, or a blockage  Abdominal pain can also be from a hernia, appendicitis, or an ulcer  Liver, gallbladder, or kidney conditions can also cause abdominal pain  The cause of your abdominal pain may be unknown  DISCHARGE INSTRUCTIONS:   Return to the emergency department if:   · You have new chest pain or shortness of breath  · You have pulsing pain in your upper abdomen or lower back that suddenly becomes constant  · Your pain is in the right lower abdominal area and worsens with movement  · You have a fever over 100 4°F (38°C) or shaking chills  · You are vomiting and cannot keep food or liquids down  · Your pain does not improve or gets worse over the next 8 to 12 hours  · You see blood in your vomit or bowel movements, or they look black and tarry  · Your skin or the whites of your eyes turn yellow  · You are a woman and have a large amount of vaginal bleeding that is not your monthly period  Contact your healthcare provider if:   · You have pain in your lower back  · You are a man and have pain in your testicles  · You have pain when you urinate  · You have questions or concerns about your condition or care  Follow up with your healthcare provider within 24 hours or as directed:  Write down your questions so you remember to ask them during your visits  Medicines:   · Medicines  may be given to calm your stomach and prevent vomiting or to decrease pain  Ask how to take pain medicine safely  · Take your medicine as directed  Contact your healthcare provider if you think your medicine is not helping or if you have side effects  Tell him of her if you are allergic to any medicine   Keep a list of the medicines, vitamins, and herbs you take  Include the amounts, and when and why you take them  Bring the list or the pill bottles to follow-up visits  Carry your medicine list with you in case of an emergency  © 2017 [address] Information is for End User's use only and may not be sold, redistributed or otherwise used for commercial purposes  All illustrations and images included in CareNotes® are the copyrighted property of [employer]  or Marito Garcia  The above information is an  only  It is not intended as medical advice for individual conditions or treatments  Talk to your doctor, nurse or pharmacist before following any medical regimen to see if it is safe and effective for you  DISCHARGE INSTRUCTIONS:    FOLLOW UP WITH YOUR PRIMARY CARE PROVIDER OR THE [address] [place] [address]  MAKE AN APPOINTMENT TO BE SEEN  TAKE NAPROXEN AS PRESCRIBED FOR PAIN AND INFLAMMATION  IF RASH, SHORTNESS OF BREATH OR TROUBLE SWALLOWING, STOP TAKING THE MEDICATION AND BE SEEN  REST AND DRINK PLENTY OF FLUIDS  FOLLOW A BLAND DIET  IF SYMPTOMS WORSEN OR NEW SYMPTOMS ARISE, RETURN TO THE ER TO BE SEEN

## 2018-11-09 NOTE — ED PROVIDER NOTES
History  Chief Complaint   Patient presents with    Abdominal Pain     Patient reports he has chronic pancreatitis, reporting increased abdominal pain since yesterday       38y  o male with PMH of asthma, chronic pancreatitis, DM, HTN and chronic abdominal pain presents to the ER for diffuse abdominal pain for 1 week but worsening within the last 2 days  Patient has been taking Tylenol, Motrin, Naproxen and medical marijuana without relief  He describes his pain as cramping and radiating into his back  He rates his pain 9/10 and states it is constant  He denies sick contacts or recent travel  Associated symptoms: diarrhea  He denies fever, chills, chest pain, dyspnea, N/V, urinary symptoms, weakness or paresthesias  History provided by:  Patient   used: No        Prior to Admission Medications   Prescriptions Last Dose Informant Patient Reported? Taking?   acetaminophen (TYLENOL) 325 mg tablet   Yes Yes   Sig: Take 650 mg by mouth every 6 (six) hours as needed for mild pain   methocarbamol (ROBAXIN) 500 mg tablet   No Yes   Sig: Take 2 tablets (1,000 mg total) by mouth every 8 (eight) hours as needed for muscle spasms for up to 7 days   naproxen (NAPROSYN) 500 mg tablet   Yes Yes   Sig: Take 250 mg by mouth 2 (two) times a day with meals      Facility-Administered Medications: None       Past Medical History:   Diagnosis Date    Asthma     [facility] [address] [facility]     [facility]     Hypertension        Past Surgical History:   Procedure Laterality Date    ABDOMINAL SURGERY      FRACTURE SURGERY         History reviewed  No pertinent family history  I have reviewed and agree with the history as documented  Social History   Substance Use Topics    Smoking status: Current Every Day Smoker     Packs/day: 1 00     Types: Cigarettes    Smokeless tobacco: Never Used    Alcohol use No        Review of Systems   Constitutional: Negative for chills and fever     Eyes: Negative for redness  Respiratory: Negative for shortness of breath  Cardiovascular: Negative for chest pain  Gastrointestinal: Positive for abdominal pain and diarrhea  Negative for nausea and vomiting  Genitourinary: Negative for dysuria, frequency, hematuria and urgency  Musculoskeletal: Negative for neck stiffness  Skin: Negative for rash  Allergic/Immunologic: Negative for food allergies  Neurological: Negative for weakness and numbness  Physical Exam  Physical Exam   Constitutional: He is active  Non-toxic appearance  No distress  HENT:   Head: Normocephalic and atraumatic  Right Ear: Tympanic membrane, external ear and ear canal normal  No drainage, swelling or tenderness  No foreign bodies  Tympanic membrane is not erythematous  No hemotympanum  Left Ear: Tympanic membrane, external ear and ear canal normal  No drainage, swelling or tenderness  No foreign bodies  Tympanic membrane is not erythematous  No hemotympanum  Nose: Nose normal    Mouth/Throat: Uvula is midline, oropharynx is clear and moist and mucous membranes are normal  No uvula swelling  No posterior oropharyngeal edema, posterior oropharyngeal erythema or tonsillar abscesses  No tonsillar exudate  Neck: Normal range of motion and phonation normal  Neck supple  No tracheal deviation present  Cardiovascular: Normal rate, regular rhythm, S1 normal, S2 normal and normal heart sounds  Exam reveals no gallop and no friction rub  No murmur heard  Pulmonary/Chest: Effort normal and breath sounds normal  No respiratory distress  He has no decreased breath sounds  He has no wheezes  He has no rhonchi  He has no rales  He exhibits no tenderness  Abdominal: Soft  Bowel sounds are normal  He exhibits no distension  There is generalized tenderness  There is no rebound, no guarding and no CVA tenderness  Neurological: He is alert  GCS eye subscore is 4  GCS verbal subscore is 5  GCS motor subscore is 6     Skin: Skin is warm and dry  No rash noted  Psychiatric: He has a normal mood and affect  Nursing note and vitals reviewed        Vital Signs  ED Triage Vitals [11/09/18 1200]   Temperature Pulse Respirations Blood Pressure SpO2   98 2 °F (36 8 °C) 94 18 149/96 98 %      Temp Source Heart Rate Source Patient Position - Orthostatic VS BP Location FiO2 (%)   Oral Monitor Sitting Right arm --      Pain Score       9           Vitals:    11/09/18 1200 11/09/18 1337 11/09/18 1511   BP: 149/96 119/86 121/83   Pulse: 94 82 69   Patient Position - Orthostatic VS: Sitting Sitting Lying       Visual Acuity      ED Medications  Medications   sodium chloride 0 9 % bolus 1,000 mL (0 mL Intravenous Stopped 11/9/18 1338)   morphine (PF) 4 mg/mL injection 4 mg (4 mg Intravenous Given 11/9/18 1248)   iohexol (OMNIPAQUE) 350 MG/ML injection (MULTI-DOSE) 100 mL (100 mL Intravenous Given 11/9/18 1413)   morphine injection 2 mg (2 mg Intravenous Given 11/9/18 1425)       Diagnostic Studies  Results Reviewed     Procedure Component Value Units Date/Time    Urine Microscopic [[id number]]  (Abnormal) Collected:  11/09/18 1306    Lab Status:  Final result Specimen:  Urine from Urine, Clean Catch Updated:  11/09/18 1356     RBC, UA 30-50 (A) /hpf      WBC, UA 0-1 (A) /hpf      Epithelial Cells Occasional /hpf      Bacteria, UA Occasional /hpf     Comprehensive metabolic panel [[id number]]  (Abnormal) Collected:  11/09/18 1247    Lab Status:  Final result Specimen:  Blood from Arm, Right Updated:  11/09/18 1340     Sodium 138 mmol/L      Potassium 3 6 mmol/L      Chloride 100 mmol/L      CO2 29 mmol/L      ANION GAP 9 mmol/L      BUN 17 mg/dL      Creatinine 0 86 mg/dL      Glucose 176 (H) mg/dL      Calcium 9 3 mg/dL      AST 15 U/L      ALT 24 U/L      Alkaline Phosphatase 92 U/L      Total Protein 7 8 g/dL      Albumin 4 0 g/dL      Total Bilirubin 0 44 mg/dL      eGFR 108 ml/min/1 73sq m     Narrative:         National Kidney Disease Education Program recommendations are as follows:  GFR calculation is accurate only with a steady state creatinine  Chronic Kidney disease less than 60 ml/min/1 73 sq  meters  Kidney failure less than 15 ml/min/1 73 sq  meters      Lipase [[id number]]  (Normal) Collected:  11/09/18 1247    Lab Status:  Final result Specimen:  Blood from Arm, Right Updated:  11/09/18 1326     Lipase 229 u/L     CBC and differential [[id number]]  (Abnormal) Collected:  11/09/18 1247    Lab Status:  Final result Specimen:  Blood from Arm, Right Updated:  11/09/18 1258     WBC 5 28 Thousand/uL      RBC 5 72 (H) Million/uL      Hemoglobin 15 3 g/dL      Hematocrit 46 1 %      MCV 81 (L) fL      MCH 26 7 (L) pg      MCHC 33 2 g/dL      RDW 13 6 %      MPV 9 3 fL      Platelets 902 Thousands/uL      nRBC 0 /100 WBCs      Neutrophils Relative 68 %      Immat GRANS % 0 %      Lymphocytes Relative 24 %      Monocytes Relative 6 %      Eosinophils Relative 1 %      Basophils Relative 1 %      Neutrophils Absolute 3 57 Thousands/µL      Immature Grans Absolute 0 02 Thousand/uL      Lymphocytes Absolute 1 26 Thousands/µL      Monocytes Absolute 0 32 Thousand/µL      Eosinophils Absolute 0 07 Thousand/µL      Basophils Absolute 0 04 Thousands/µL     POCT urinalysis dipstick [[id number]]  (Abnormal) Resulted:  11/09/18 1255    Lab Status:  Final result Specimen:  Urine Updated:  11/09/18 1255    ED Urine Macroscopic [[id number]]  (Abnormal) Collected:  11/09/18 1306    Lab Status:  Final result Specimen:  Urine Updated:  11/09/18 1253     Color, UA Yellow     Clarity, UA Clear     pH, UA 5 5     Leukocytes, UA Negative     Nitrite, UA Negative     Protein, UA 30 (1+) (A) mg/dl      Glucose, UA Negative mg/dl      Ketones, UA Negative mg/dl      Urobilinogen, UA 0 2 E U /dl      Bilirubin, UA Interference- unable to analyze (A)     Blood, UA Large (A)     Specific Gravity, UA >=1 030    Narrative:       [facility] RESULT                 CT abdomen pelvis with contrast   Final Result by King Ruel MD (11/09 1794)      1  No acute CT findings within abdomen and pelvis  2  Sequela of chronic pancreatitis with atrophy and areas of calcification centered at the tail of pancreas and splenic hilum, presumably calcified pseudocysts  Grossly unchanged from CT 3/9/2018  No appreciable peripancreatic stranding or fluid to    suggest acute pancreatitis  3   Right renal nonobstructing calculi  4  Stable hepatosplenomegaly, chronic splenic vein thrombosis and perigastric collateral vessels  Workstation performed: [id number]                    Procedures  Procedures       Phone Contacts  ED Phone Contact    ED Course                               MDM  Number of Diagnoses or Management Options  Abdominal pain: new and requires workup  Diagnosis management comments: DDX consists of but not limited to: pancreatitis, obstruction, gastroenteritis    Will check CBC, CMP, lipase, urine and CT abdomen  Will give morphine and fluids  Informed patient of lab and imaging findings  Patient reports improvement in symptoms  Will discharge  At discharge, I instructed the patient to:  -follow up with pcp  -take Naproxen as prescribed for pain and inflammation  -rest and drink plenty of fluids  -follow a bland diet  -follow up with your GI specialist at your appointment in December  -return to the ER if symptoms worsened or new symptoms arose  Patient agreed to this plan and was stable at time of discharge         Amount and/or Complexity of Data Reviewed  Clinical lab tests: ordered and reviewed  Tests in the radiology section of CPT®: ordered and reviewed  Review and summarize past medical records: yes    Patient Progress  Patient progress: stable    CritCare Time    Disposition  Final diagnoses:   Abdominal pain     Time reflects when diagnosis was documented in both MDM as applicable and the Disposition within this note     Time User Action Codes Description Comment    11/9/2018  3:15 PM Doug Middleton Add [R10 9] Abdominal pain       ED Disposition     ED Disposition Condition Comment    Discharge  Darlyn Hendrix discharge to home/self care  Condition at discharge: Stable        Follow-up Information     Follow up With Specialties Details Why Contact Info    Ross Holstein, MD Family Medicine Schedule an appointment as soon as possible for a visit in 1 day  Garry Mukul  [phone]            Patient's Medications   Discharge Prescriptions    No medications on file     No discharge procedures on file      ED Provider  Electronically Signed by           Carlos Rowley PA-C  11/09/18 0960

## 2019-03-01 ENCOUNTER — HOSPITAL ENCOUNTER (INPATIENT)
Facility: HOSPITAL | Age: 39
LOS: 2 days | Discharge: HOME | DRG: 282 | End: 2019-03-03
Attending: EMERGENCY MEDICINE | Admitting: INTERNAL MEDICINE
Payer: MEDICARE

## 2019-03-01 ENCOUNTER — APPOINTMENT (EMERGENCY)
Dept: CT IMAGING | Facility: HOSPITAL | Age: 39
DRG: 282 | End: 2019-03-01
Payer: MEDICARE

## 2019-03-01 DIAGNOSIS — K85.90 ACUTE ON CHRONIC PANCREATITIS (HCC): ICD-10-CM

## 2019-03-01 DIAGNOSIS — K86.1 ACUTE ON CHRONIC PANCREATITIS (HCC): ICD-10-CM

## 2019-03-01 DIAGNOSIS — K85.90 PANCREATITIS: Primary | ICD-10-CM

## 2019-03-01 PROBLEM — E11.65 TYPE 2 DIABETES MELLITUS WITH HYPERGLYCEMIA, WITHOUT LONG-TERM CURRENT USE OF INSULIN (HCC): Chronic | Status: ACTIVE | Noted: 2019-03-01

## 2019-03-01 PROBLEM — N20.0 NEPHROLITHIASIS: Status: ACTIVE | Noted: 2019-03-01

## 2019-03-01 PROBLEM — E87.6 HYPOKALEMIA: Status: ACTIVE | Noted: 2019-03-01

## 2019-03-01 PROBLEM — J96.01 ACUTE RESPIRATORY FAILURE WITH HYPOXIA (HCC): Status: ACTIVE | Noted: 2019-03-01

## 2019-03-01 PROBLEM — J45.20 MILD INTERMITTENT ASTHMA WITHOUT COMPLICATION: Chronic | Status: ACTIVE | Noted: 2019-03-01

## 2019-03-01 LAB
ALBUMIN SERPL BCP-MCNC: 3.9 G/DL (ref 3.5–5)
ALP SERPL-CCNC: 89 U/L (ref 46–116)
ALT SERPL W P-5'-P-CCNC: 26 U/L (ref 12–78)
ANION GAP SERPL CALCULATED.3IONS-SCNC: 12 MMOL/L (ref 4–13)
AST SERPL W P-5'-P-CCNC: 20 U/L (ref 5–45)
BASOPHILS # BLD AUTO: 0.02 THOUSANDS/ΜL (ref 0–0.1)
BASOPHILS NFR BLD AUTO: 0 % (ref 0–1)
BILIRUB SERPL-MCNC: 0.5 MG/DL (ref 0.2–1)
BUN SERPL-MCNC: 19 MG/DL (ref 5–25)
CALCIUM SERPL-MCNC: 9.1 MG/DL (ref 8.3–10.1)
CHLORIDE SERPL-SCNC: 95 MMOL/L (ref 100–108)
CO2 SERPL-SCNC: 27 MMOL/L (ref 21–32)
CREAT SERPL-MCNC: 0.89 MG/DL (ref 0.6–1.3)
EOSINOPHIL # BLD AUTO: 0.11 THOUSAND/ΜL (ref 0–0.61)
EOSINOPHIL NFR BLD AUTO: 2 % (ref 0–6)
ERYTHROCYTE [DISTWIDTH] IN BLOOD BY AUTOMATED COUNT: 12.7 % (ref 11.6–15.1)
GFR SERPL CREATININE-BSD FRML MDRD: 107 ML/MIN/1.73SQ M
GLUCOSE SERPL-MCNC: 141 MG/DL (ref 65–140)
GLUCOSE SERPL-MCNC: 143 MG/DL (ref 65–140)
GLUCOSE SERPL-MCNC: 160 MG/DL (ref 65–140)
GLUCOSE SERPL-MCNC: 272 MG/DL (ref 65–140)
HCT VFR BLD AUTO: 44.9 % (ref 36.5–49.3)
HGB BLD-MCNC: 14.9 G/DL (ref 12–17)
IMM GRANULOCYTES # BLD AUTO: 0.03 THOUSAND/UL (ref 0–0.2)
IMM GRANULOCYTES NFR BLD AUTO: 1 % (ref 0–2)
LACTATE SERPL-SCNC: 1.7 MMOL/L (ref 0.5–2)
LIPASE SERPL-CCNC: 967 U/L (ref 73–393)
LYMPHOCYTES # BLD AUTO: 1.02 THOUSANDS/ΜL (ref 0.6–4.47)
LYMPHOCYTES NFR BLD AUTO: 18 % (ref 14–44)
MCH RBC QN AUTO: 27.2 PG (ref 26.8–34.3)
MCHC RBC AUTO-ENTMCNC: 33.2 G/DL (ref 31.4–37.4)
MCV RBC AUTO: 82 FL (ref 82–98)
MONOCYTES # BLD AUTO: 0.42 THOUSAND/ΜL (ref 0.17–1.22)
MONOCYTES NFR BLD AUTO: 7 % (ref 4–12)
NEUTROPHILS # BLD AUTO: 4.04 THOUSANDS/ΜL (ref 1.85–7.62)
NEUTS SEG NFR BLD AUTO: 72 % (ref 43–75)
NRBC BLD AUTO-RTO: 0 /100 WBCS
PLATELET # BLD AUTO: 137 THOUSANDS/UL (ref 149–390)
PMV BLD AUTO: 9.8 FL (ref 8.9–12.7)
POTASSIUM SERPL-SCNC: 3.1 MMOL/L (ref 3.5–5.3)
PROT SERPL-MCNC: 7.6 G/DL (ref 6.4–8.2)
RBC # BLD AUTO: 5.48 MILLION/UL (ref 3.88–5.62)
SODIUM SERPL-SCNC: 134 MMOL/L (ref 136–145)
WBC # BLD AUTO: 5.64 THOUSAND/UL (ref 4.31–10.16)

## 2019-03-01 PROCEDURE — 80053 COMPREHEN METABOLIC PANEL: CPT | Performed by: EMERGENCY MEDICINE

## 2019-03-01 PROCEDURE — 83605 ASSAY OF LACTIC ACID: CPT | Performed by: EMERGENCY MEDICINE

## 2019-03-01 PROCEDURE — 96374 THER/PROPH/DIAG INJ IV PUSH: CPT

## 2019-03-01 PROCEDURE — 96375 TX/PRO/DX INJ NEW DRUG ADDON: CPT

## 2019-03-01 PROCEDURE — 85025 COMPLETE CBC W/AUTO DIFF WBC: CPT | Performed by: EMERGENCY MEDICINE

## 2019-03-01 PROCEDURE — 82948 REAGENT STRIP/BLOOD GLUCOSE: CPT

## 2019-03-01 PROCEDURE — 96376 TX/PRO/DX INJ SAME DRUG ADON: CPT

## 2019-03-01 PROCEDURE — 74177 CT ABD & PELVIS W/CONTRAST: CPT

## 2019-03-01 PROCEDURE — 36415 COLL VENOUS BLD VENIPUNCTURE: CPT | Performed by: EMERGENCY MEDICINE

## 2019-03-01 PROCEDURE — 99285 EMERGENCY DEPT VISIT HI MDM: CPT

## 2019-03-01 PROCEDURE — 83690 ASSAY OF LIPASE: CPT | Performed by: EMERGENCY MEDICINE

## 2019-03-01 PROCEDURE — 96361 HYDRATE IV INFUSION ADD-ON: CPT

## 2019-03-01 PROCEDURE — 99223 1ST HOSP IP/OBS HIGH 75: CPT | Performed by: INTERNAL MEDICINE

## 2019-03-01 RX ORDER — ONDANSETRON 2 MG/ML
4 INJECTION INTRAMUSCULAR; INTRAVENOUS EVERY 6 HOURS PRN
Status: DISCONTINUED | OUTPATIENT
Start: 2019-03-01 | End: 2019-03-03 | Stop reason: HOSPADM

## 2019-03-01 RX ORDER — KETOROLAC TROMETHAMINE 30 MG/ML
30 INJECTION, SOLUTION INTRAMUSCULAR; INTRAVENOUS ONCE
Status: COMPLETED | OUTPATIENT
Start: 2019-03-01 | End: 2019-03-01

## 2019-03-01 RX ORDER — SODIUM CHLORIDE 9 MG/ML
125 INJECTION, SOLUTION INTRAVENOUS CONTINUOUS
Status: DISCONTINUED | OUTPATIENT
Start: 2019-03-01 | End: 2019-03-01

## 2019-03-01 RX ORDER — POTASSIUM CHLORIDE 20 MEQ/1
40 TABLET, EXTENDED RELEASE ORAL ONCE
Status: COMPLETED | OUTPATIENT
Start: 2019-03-01 | End: 2019-03-01

## 2019-03-01 RX ORDER — HYDROMORPHONE HCL/PF 1 MG/ML
1 SYRINGE (ML) INJECTION ONCE
Status: COMPLETED | OUTPATIENT
Start: 2019-03-01 | End: 2019-03-01

## 2019-03-01 RX ORDER — AMLODIPINE BESYLATE 10 MG/1
10 TABLET ORAL
COMMUNITY
Start: 2018-09-28 | End: 2019-07-24

## 2019-03-01 RX ORDER — OMEGA-3-ACID ETHYL ESTERS 1 G/1
4 CAPSULE, LIQUID FILLED ORAL
COMMUNITY
Start: 2018-10-24 | End: 2019-07-24

## 2019-03-01 RX ORDER — SODIUM CHLORIDE AND POTASSIUM CHLORIDE .9; .15 G/100ML; G/100ML
100 SOLUTION INTRAVENOUS CONTINUOUS
Status: DISCONTINUED | OUTPATIENT
Start: 2019-03-01 | End: 2019-03-03 | Stop reason: HOSPADM

## 2019-03-01 RX ORDER — NICOTINE 21 MG/24HR
1 PATCH, TRANSDERMAL 24 HOURS TRANSDERMAL DAILY
Status: DISCONTINUED | OUTPATIENT
Start: 2019-03-01 | End: 2019-03-03 | Stop reason: HOSPADM

## 2019-03-01 RX ORDER — HYDROMORPHONE HCL/PF 1 MG/ML
1 SYRINGE (ML) INJECTION ONCE
Status: DISCONTINUED | OUTPATIENT
Start: 2019-03-01 | End: 2019-03-01

## 2019-03-01 RX ORDER — GEMFIBROZIL 600 MG/1
600 TABLET, FILM COATED ORAL 2 TIMES DAILY
COMMUNITY
Start: 2018-09-28 | End: 2019-07-24

## 2019-03-01 RX ORDER — CARVEDILOL 12.5 MG/1
12.5 TABLET ORAL
COMMUNITY
Start: 2018-09-28 | End: 2019-07-24

## 2019-03-01 RX ORDER — FLUTICASONE FUROATE AND VILANTEROL 200; 25 UG/1; UG/1
1 POWDER RESPIRATORY (INHALATION)
Status: DISCONTINUED | OUTPATIENT
Start: 2019-03-01 | End: 2019-03-03 | Stop reason: HOSPADM

## 2019-03-01 RX ORDER — GEMFIBROZIL 600 MG/1
600 TABLET, FILM COATED ORAL
Status: DISCONTINUED | OUTPATIENT
Start: 2019-03-01 | End: 2019-03-03 | Stop reason: HOSPADM

## 2019-03-01 RX ORDER — HYDROMORPHONE HCL/PF 1 MG/ML
1 SYRINGE (ML) INJECTION ONCE AS NEEDED
Status: DISCONTINUED | OUTPATIENT
Start: 2019-03-01 | End: 2019-03-01

## 2019-03-01 RX ORDER — GEMFIBROZIL 600 MG/1
600 TABLET, FILM COATED ORAL
Status: DISCONTINUED | OUTPATIENT
Start: 2019-03-01 | End: 2019-03-01

## 2019-03-01 RX ORDER — ALBUTEROL SULFATE 90 UG/1
2 AEROSOL, METERED RESPIRATORY (INHALATION) EVERY 4 HOURS PRN
Status: DISCONTINUED | OUTPATIENT
Start: 2019-03-01 | End: 2019-03-03 | Stop reason: HOSPADM

## 2019-03-01 RX ORDER — CARVEDILOL 6.25 MG/1
12.5 TABLET ORAL 2 TIMES DAILY WITH MEALS
Status: DISCONTINUED | OUTPATIENT
Start: 2019-03-01 | End: 2019-03-03 | Stop reason: HOSPADM

## 2019-03-01 RX ORDER — ONDANSETRON 2 MG/ML
4 INJECTION INTRAMUSCULAR; INTRAVENOUS ONCE AS NEEDED
Status: DISCONTINUED | OUTPATIENT
Start: 2019-03-01 | End: 2019-03-01

## 2019-03-01 RX ORDER — ACETAMINOPHEN 325 MG/1
650 TABLET ORAL EVERY 6 HOURS PRN
Status: DISCONTINUED | OUTPATIENT
Start: 2019-03-01 | End: 2019-03-03 | Stop reason: HOSPADM

## 2019-03-01 RX ORDER — CHLORAL HYDRATE 500 MG
1000 CAPSULE ORAL DAILY
Status: DISCONTINUED | OUTPATIENT
Start: 2019-03-01 | End: 2019-03-03 | Stop reason: HOSPADM

## 2019-03-01 RX ORDER — LEVALBUTEROL INHALATION SOLUTION 0.63 MG/3ML
0.63 SOLUTION RESPIRATORY (INHALATION) EVERY 8 HOURS PRN
Status: DISCONTINUED | OUTPATIENT
Start: 2019-03-01 | End: 2019-03-03 | Stop reason: HOSPADM

## 2019-03-01 RX ORDER — AMLODIPINE BESYLATE 10 MG/1
10 TABLET ORAL DAILY
Status: DISCONTINUED | OUTPATIENT
Start: 2019-03-01 | End: 2019-03-03 | Stop reason: HOSPADM

## 2019-03-01 RX ORDER — CHLORTHALIDONE 25 MG/1
50 TABLET ORAL
COMMUNITY
Start: 2018-09-14 | End: 2019-07-24

## 2019-03-01 RX ADMIN — MORPHINE SULFATE 2 MG: 2 INJECTION, SOLUTION INTRAMUSCULAR; INTRAVENOUS at 14:21

## 2019-03-01 RX ADMIN — KETOROLAC TROMETHAMINE 30 MG: 30 INJECTION, SOLUTION INTRAMUSCULAR at 02:41

## 2019-03-01 RX ADMIN — GEMFIBROZIL 600 MG: 600 TABLET ORAL at 17:02

## 2019-03-01 RX ADMIN — SODIUM CHLORIDE 125 ML/HR: 0.9 INJECTION, SOLUTION INTRAVENOUS at 04:50

## 2019-03-01 RX ADMIN — Medication 1000 MG: at 08:53

## 2019-03-01 RX ADMIN — SODIUM CHLORIDE 1000 ML: 0.9 INJECTION, SOLUTION INTRAVENOUS at 02:50

## 2019-03-01 RX ADMIN — GEMFIBROZIL 600 MG: 600 TABLET ORAL at 08:53

## 2019-03-01 RX ADMIN — SODIUM CHLORIDE 125 ML/HR: 0.9 INJECTION, SOLUTION INTRAVENOUS at 05:45

## 2019-03-01 RX ADMIN — IOHEXOL 100 ML: 350 INJECTION, SOLUTION INTRAVENOUS at 03:41

## 2019-03-01 RX ADMIN — HYDROMORPHONE HYDROCHLORIDE 1 MG: 1 INJECTION, SOLUTION INTRAMUSCULAR; INTRAVENOUS; SUBCUTANEOUS at 03:45

## 2019-03-01 RX ADMIN — HYDROMORPHONE HYDROCHLORIDE 1 MG: 1 INJECTION, SOLUTION INTRAMUSCULAR; INTRAVENOUS; SUBCUTANEOUS at 02:42

## 2019-03-01 RX ADMIN — ONDANSETRON 4 MG: 2 INJECTION INTRAMUSCULAR; INTRAVENOUS at 08:07

## 2019-03-01 RX ADMIN — NICOTINE 1 PATCH: 21 PATCH TRANSDERMAL at 08:53

## 2019-03-01 RX ADMIN — CARVEDILOL 12.5 MG: 6.25 TABLET, FILM COATED ORAL at 17:02

## 2019-03-01 RX ADMIN — SODIUM CHLORIDE 1000 ML: 0.9 INJECTION, SOLUTION INTRAVENOUS at 02:44

## 2019-03-01 RX ADMIN — SODIUM CHLORIDE AND POTASSIUM CHLORIDE 100 ML/HR: .9; .15 SOLUTION INTRAVENOUS at 12:31

## 2019-03-01 RX ADMIN — MORPHINE SULFATE 2 MG: 2 INJECTION, SOLUTION INTRAMUSCULAR; INTRAVENOUS at 06:48

## 2019-03-01 RX ADMIN — MORPHINE SULFATE 2 MG: 2 INJECTION, SOLUTION INTRAMUSCULAR; INTRAVENOUS at 21:28

## 2019-03-01 RX ADMIN — FAMOTIDINE 20 MG: 10 INJECTION, SOLUTION INTRAVENOUS at 02:42

## 2019-03-01 RX ADMIN — FLUTICASONE FUROATE AND VILANTEROL TRIFENATATE 1 PUFF: 200; 25 POWDER RESPIRATORY (INHALATION) at 08:53

## 2019-03-01 RX ADMIN — MORPHINE SULFATE 2 MG: 2 INJECTION, SOLUTION INTRAMUSCULAR; INTRAVENOUS at 18:10

## 2019-03-01 RX ADMIN — POTASSIUM CHLORIDE 40 MEQ: 1500 TABLET, EXTENDED RELEASE ORAL at 06:48

## 2019-03-01 RX ADMIN — MORPHINE SULFATE 2 MG: 2 INJECTION, SOLUTION INTRAMUSCULAR; INTRAVENOUS at 11:17

## 2019-03-01 NOTE — ED PROVIDER NOTES
History  Chief Complaint   Patient presents with    Abdominal Pain     three days of upper abdomen pain, "it feels like my pancreas", NVD reported as well     35 yo male with hx of chronic pancreatitis who is presenting with upper abd pain x past 3 days which has worsened  Similar to in past - CT never shows acute findings, just chronic pancreatitis  We discussed radiation and my concerns for rescanning - he is relieved because his doctor had same concerns  History provided by:  Patient   used: No    Abdominal Pain   Pain location:  Epigastric and LUQ  Pain quality: aching, dull and pressure    Pain radiates to:  Does not radiate  Pain severity:  Severe  Onset quality:  Gradual  Duration:  3 days  Timing:  Constant  Progression:  Worsening  Chronicity:  Recurrent  Relieved by:  Nothing  Worsened by:  Eating  Ineffective treatments:  None tried  Associated symptoms: anorexia    Associated symptoms: no chest pain, no chills, no diarrhea, no dysuria, no fever, no nausea, no shortness of breath, no sore throat and no vomiting    Risk factors: multiple surgeries        Prior to Admission Medications   Prescriptions Last Dose Informant Patient Reported? Taking?    ALBUTEROL IN   Yes Yes   Sig: Inhale 2 puffs every 4 (four) hours as needed   acetaminophen (TYLENOL) 325 mg tablet   Yes No   Sig: Take 650 mg by mouth every 6 (six) hours as needed for mild pain   amLODIPine (NORVASC) 10 mg tablet   Yes Yes   Sig: Take 10 mg by mouth   carvedilol (COREG) 12 5 mg tablet   Yes Yes   Sig: Take 12 5 mg by mouth   chlorthalidone 25 mg tablet   Yes Yes   Sig: Take 50 mg by mouth   fluticasone-salmeterol (ADVAIR DISKUS) 250-50 mcg/dose inhaler   Yes Yes   Sig: Inhale 1 puff   gemfibrozil (LOPID) 600 mg tablet   Yes Yes   Sig: Take 600 mg by mouth   metFORMIN (GLUCOPHAGE) 1000 MG tablet   Yes Yes   Sig: Take 1,000 mg by mouth   methocarbamol (ROBAXIN) 500 mg tablet   No No   Sig: Take 2 tablets (1,000 mg total) by mouth every 8 (eight) hours as needed for muscle spasms for up to 7 days   naproxen (NAPROSYN) 500 mg tablet   No No   Sig: Take 1 tablet (500 mg total) by mouth 2 (two) times a day with meals   omega-3-acid ethyl esters (LOVAZA) 1 g capsule   Yes Yes   Sig: Take 4 g by mouth   pancrelipase, Lip-Prot-Amyl, (CREON) 12,000 units capsule   Yes Yes   Sig: Take 2 capsules by mouth      Facility-Administered Medications: None       Past Medical History:   Diagnosis Date    Asthma     Chronic pancreatitis ([facility] )     Diabetes mellitus ([facility] )     Hypertension        Past Surgical History:   Procedure Laterality Date    ABDOMINAL SURGERY      FRACTURE SURGERY         History reviewed  No pertinent family history  I have reviewed and agree with the history as documented  Social History     Tobacco Use    Smoking status: Current Every Day Smoker     Packs/day: 1 00     Types: Cigarettes    Smokeless tobacco: Never Used   Substance Use Topics    Alcohol use: No    Drug use: Yes     Types: Marijuana     Comment: daily         Review of Systems   Constitutional: Negative for chills and fever  HENT: Negative for congestion and sore throat  Eyes: Negative for visual disturbance  Respiratory: Negative for shortness of breath and wheezing  Cardiovascular: Negative for chest pain and palpitations  Gastrointestinal: Positive for abdominal pain (epigastric, LUQ) and anorexia  Negative for diarrhea, nausea and vomiting  Genitourinary: Negative for dysuria  Musculoskeletal: Negative for neck pain and neck stiffness  Skin: Negative for pallor and rash  Neurological: Negative for headaches  Psychiatric/Behavioral: Negative for confusion  All other systems reviewed and are negative  Physical Exam  Physical Exam   Constitutional: He is oriented to person, place, and time  He appears well-developed and well-nourished  No distress  HENT:   Head: Normocephalic and atraumatic     Right Ear: External ear normal    Left Ear: External ear normal    Mouth/Throat: Oropharynx is clear and moist    Eyes: EOM are normal    Neck: Neck supple  Cardiovascular: Normal rate and regular rhythm  No murmur heard  Pulmonary/Chest: Effort normal and breath sounds normal    Abdominal: Soft  Bowel sounds are normal  He exhibits no distension  There is tenderness in the epigastric area and left upper quadrant  Musculoskeletal: Normal range of motion  He exhibits no edema  Neurological: He is alert and oriented to person, place, and time  Skin: Skin is warm  No rash noted  No pallor  Psychiatric: He has a normal mood and affect  His behavior is normal    Nursing note and vitals reviewed        Vital Signs  ED Triage Vitals   Temperature Pulse Respirations Blood Pressure SpO2   03/01/19 0236 03/01/19 0220 03/01/19 0220 03/01/19 0220 03/01/19 0220   98 °F (36 7 °C) 86 18 131/82 96 %      Temp src Heart Rate Source Patient Position - Orthostatic VS BP Location FiO2 (%)   -- 03/01/19 0449 03/01/19 0449 03/01/19 0449 --    Monitor Lying Left arm       Pain Score       03/01/19 0220       7           Vitals:    03/01/19 0220 03/01/19 0449   BP: 131/82 97/56   Pulse: 86 75   Patient Position - Orthostatic VS:  Lying       Visual Acuity      ED Medications  Medications   sodium chloride 0 9 % infusion (125 mL/hr Intravenous New Bag 3/1/19 0450)   HYDROmorphone (DILAUDID) injection 1 mg (0 mg Intravenous Hold 3/1/19 0430)   sodium chloride 0 9 % bolus 1,000 mL (0 mL Intravenous Stopped 3/1/19 0444)   sodium chloride 0 9 % bolus 1,000 mL (0 mL Intravenous Stopped 3/1/19 0444)   ketorolac (TORADOL) injection 30 mg (30 mg Intravenous Given 3/1/19 0241)   HYDROmorphone (DILAUDID) injection 1 mg (1 mg Intravenous Given 3/1/19 0242)   famotidine (PEPCID) injection 20 mg (20 mg Intravenous Given 3/1/19 0242)   sodium chloride 0 9 % bolus 1,000 mL (0 mL Intravenous Stopped 3/1/19 0444)   HYDROmorphone (DILAUDID) injection 1 mg (1 mg Intravenous Given 3/1/19 0345)   iohexol (OMNIPAQUE) 350 MG/ML injection (SINGLE-DOSE) 100 mL (100 mL Intravenous Given 3/1/19 0341)       Diagnostic Studies  Results Reviewed     Procedure Component Value Units Date/Time    Lactic acid, plasma [[id number]]  (Normal) Collected:  03/01/19 0244    Lab Status:  Final result Specimen:  Blood from Arm, Left Updated:  03/01/19 0315     LACTIC ACID 1 7 mmol/L     Narrative:       Result may be elevated if tourniquet was used during collection  Comprehensive metabolic panel [[id number]]  (Abnormal) Collected:  03/01/19 0244    Lab Status:  Final result Specimen:  Blood from Arm, Left Updated:  03/01/19 9866     Sodium 134 mmol/L      Potassium 3 1 mmol/L      Chloride 95 mmol/L      CO2 27 mmol/L      ANION GAP 12 mmol/L      BUN 19 mg/dL      Creatinine 0 89 mg/dL      Glucose 272 mg/dL      Calcium 9 1 mg/dL      AST 20 U/L      ALT 26 U/L      Alkaline Phosphatase 89 U/L      Total Protein 7 6 g/dL      Albumin 3 9 g/dL      Total Bilirubin 0 50 mg/dL      eGFR 107 ml/min/1 73sq m     Narrative:       National Kidney Disease Education Program recommendations are as follows:  GFR calculation is accurate only with a steady state creatinine  Chronic Kidney disease less than 60 ml/min/1 73 sq  meters  Kidney failure less than 15 ml/min/1 73 sq  meters      Lipase [[id number]]  (Abnormal) Collected:  03/01/19 0244    Lab Status:  Final result Specimen:  Blood from Arm, Left Updated:  03/01/19 0312     Lipase 967 u/L     CBC and differential [[id number]]  (Abnormal) Collected:  03/01/19 0244    Lab Status:  Final result Specimen:  Blood from Arm, Left Updated:  03/01/19 0302     WBC 5 64 Thousand/uL      RBC 5 48 Million/uL      Hemoglobin 14 9 g/dL      Hematocrit 44 9 %      MCV 82 fL      MCH 27 2 pg      MCHC 33 2 g/dL      RDW 12 7 %      MPV 9 8 fL      Platelets 808 Thousands/uL      nRBC 0 /100 WBCs      Neutrophils Relative 72 %      Immat GRANS % 1 % Lymphocytes Relative 18 %      Monocytes Relative 7 %      Eosinophils Relative 2 %      Basophils Relative 0 %      Neutrophils Absolute 4 04 Thousands/µL      Immature Grans Absolute 0 03 Thousand/uL      Lymphocytes Absolute 1 02 Thousands/µL      Monocytes Absolute 0 42 Thousand/µL      Eosinophils Absolute 0 11 Thousand/µL      Basophils Absolute 0 02 Thousands/µL                  CT abdomen pelvis with contrast   Final Result by Barb Casper MD (03/01 0406)      Acute on chronic pancreatitis  Nonobstructing stones are noted in the right kidney measuring up to 5 mm  No hydronephrosis  There is rectus diastases  A bowel loop is tethered to the infraumbilical midline fascia anteriorly     No small bowel obstruction  Workstation performed: [id number]                    Procedures  Procedures       Phone Contacts  ED Phone Contact    ED Course  ED Course as of Mar 01 0516   Fri Mar 01, 2019   0227 Pt seen and examined  35 yo male with hx of familial hypertriglyceridemia with recurrent pancreatitis  He has undergone 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  Pt has chronic pancreatitis and is presenting with upper abd pain x past 3 days which has worsened  Similar to in past - CT never shows acute findings, just chronic pancreatitis  We discussed radiation and my concerns for rescanning - he is relieved because his doctor had same concerns  Will give 2 L IVF, dilaudid, toradol, pepcid and check labs        0306 CBC WNL        0327 Lipase mildly elevated 967  Still find no reason to scan as last time pt was elevated to 1035 and no acute findings on CT  Spoke with pt who states initial round of meds "took the edge off" but pain still bad and upper abd feels distended, requesting Ct a/p  Another dose of pain meds ordered  0423 CT a/p - Acute on chronic pancreatitis  Nonobstructing stones are noted in the right kidney measuring up to 5 mm   No hydronephrosis      There is rectus diastases   A bowel loop is tethered to the infraumbilical midline fascia anteriorly     No small bowel obstruction  0425 Pt still in pain - more dilaudid and IVF ordered, will admit for pain control, NPO                                    MDM    Disposition  Final diagnoses:   Pancreatitis     Time reflects when diagnosis was documented in both MDM as applicable and the Disposition within this note     Time User Action Codes Description Comment    3/1/2019  4:32 AM Ab Dodd [K85 [id number]] Pancreatitis       ED Disposition     ED Disposition Condition Date/Time Comment    Admit Stable Fri Mar 1, 2019  4:32 AM Case was discussed with Cele ISLAS and the patient's admission status was agreed to be Admission Status: inpatient status to the service of Dr Yu Alvarez   Follow-up Information    None         Patient's Medications   Discharge Prescriptions    No medications on file     No discharge procedures on file      ED Provider  Electronically Signed by           Melissa Eaton DO  03/01/19 7953

## 2019-03-01 NOTE — PROGRESS NOTES
Pt has vape pen with medical marijuana  Nurse told pt per hospital policy, we need to hold vape pen  Pt refused  Cj Wright he would put it in his car, but he was not handing it over to us  Stated he is not using it here

## 2019-03-01 NOTE — ASSESSMENT & PLAN NOTE
· CT: Nonobstructing stones in the right kidney measuring 5 mm; no hydronephrosis  · Outpatient follow-up with Urology

## 2019-03-01 NOTE — H&P
H&P- Kalpana Co 1980, 34 y o  male MRN: [id number]    Unit/Bed#: E5 -01 Encounter: [id number]    Primary Care Provider: James Ochoa MD   Date and time admitted to hospital: 3/1/2019  2:15 AM      * Acute on chronic pancreatitis ([facility] )  Assessment & Plan  · H/o acute on chronic pancreatitis secondary to hypertriglyceridemia  · CT:  Acute on chronic pancreatitis  · Lipase 967, trend  · On Lopid and Creon, hold Creon while NPO  · Will obtain lipid panel  · NPO, IVF, pain control  · GI consult    Acute respiratory failure with hypoxia ([facility] )  Assessment & Plan  · O2 sat 89% while in ED, patient has wheezing, h/o asthma, on Advair at home  · Will order [facility], [facility] and p r n  Nebs  · Respiratory protocol    Nephrolithiasis  Assessment & Plan  · CT: Nonobstructing stones in the right kidney measuring 5 mm; no hydronephrosis  · Outpatient follow-up with Urology    Mild intermittent asthma without complication  Assessment & Plan  · Maintained on Advair and albuterol HFA, Breo Ellipta while inpatient    Type 2 diabetes mellitus with hyperglycemia, without long-term current use of insulin (HCC)  Assessment & Plan  No results found for: HGBA1C  · Hold metformin, start Accu-Cheks and sliding scale  No results for input(s): POCGLU in the last 72 hours  Blood Sugar Average: Last 72 hrs:        Hypokalemia  Assessment & Plan  · K 3 1, replete, monitor BMP    Hypertension  Assessment & Plan  · Continue amlodipine and carvedilol  Hold HCTZ    VTE Prophylaxis: Low risk  / reason for no mechanical VTE prophylaxis Ambulate   Code Status: FC  POLST: POLST is not applicable to this patient  Discussion with family:     Anticipated Length of Stay:  Patient will be admitted on an Inpatient basis with an anticipated length of stay of  > 2 midnights  Justification for Hospital Stay: acute on chronic pancreatitis    Total Time for Visit, including Counseling / Coordination of Care: 45 minutes    Greater than 50% of this total time spent on direct patient counseling and coordination of care  Chief Complaint:   Abdominal pain, nausea vomiting    History of Present Illness:    Willam Eden is a 34 y o  male who presents with abdominal pain, nausea vomiting and diarrhea x3 days  Patient somnolent, arousable to tactile stimuli, will not stay awake for assessment; denies fever or shortness of breath, unable to obtain further history  Review of Systems:    Review of Systems   Respiratory: Positive for wheezing  Gastrointestinal: Positive for abdominal pain, diarrhea, nausea and vomiting  Negative for constipation  Genitourinary: Negative  Musculoskeletal: Negative  Past Medical and Surgical History:     Past Medical History:   Diagnosis Date    Asthma     [facility]     Diabetes mellitus ([facility] )     Hypertension        Past Surgical History:   Procedure Laterality Date    ABDOMINAL SURGERY      FRACTURE SURGERY         Meds/Allergies:    Prior to Admission medications    Medication Sig Start Date End Date Taking?  Authorizing Provider   ALBUTEROL IN Inhale 2 puffs every 4 (four) hours as needed 3/16/18  Yes Historical Provider, MD   amLODIPine (NORVASC) 10 mg tablet Take 10 mg by mouth 9/28/18  Yes Historical Provider, MD   carvedilol (COREG) 12 5 mg tablet Take 12 5 mg by mouth 9/28/18 9/28/19 Yes Historical Provider, MD   chlorthalidone 25 mg tablet Take 50 mg by mouth 9/14/18  Yes Historical Provider, MD   fluticasone-salmeterol (ADVAIR DISKUS) 250-50 mcg/dose inhaler Inhale 1 puff 2/15/19 2/15/20 Yes Historical Provider, MD   gemfibrozil (LOPID) 600 mg tablet Take 600 mg by mouth 9/28/18  Yes Historical Provider, MD   metFORMIN (GLUCOPHAGE) 1000 MG tablet Take 1,000 mg by mouth 2/15/19 5/16/19 Yes Historical Provider, MD   omega-3-acid ethyl esters (LOVAZA) 1 g capsule Take 4 g by mouth 10/24/18  Yes Historical Provider, MD   pancrelipase, Lip-Prot-Amyl, (CREON) 12,000 units capsule Take 2 capsules by mouth 9/28/18  Yes Historical Provider, MD   acetaminophen (TYLENOL) 325 mg tablet Take 650 mg by mouth every 6 (six) hours as needed for mild pain    Historical Provider, MD   methocarbamol (ROBAXIN) 500 mg tablet Take 2 tablets (1,000 mg total) by mouth every 8 (eight) hours as needed for muscle spasms for up to 7 days 10/11/18 11/9/18  Tamika Easton DO   naproxen (NAPROSYN) 500 mg tablet Take 1 tablet (500 mg total) by mouth 2 (two) times a day with meals 11/9/18   Marc Shin PA-C     I have reviewed home medications with patient personally  Allergies: No Known Allergies    Social History:     Marital Status: Single   Occupation:  Unemployed  Patient Pre-hospital Living Situation:  Resides at home alone  Patient Pre-hospital Level of Mobility:  Ambulatory  Patient Pre-hospital Diet Restrictions:   Substance Use History:   Social History     Substance and Sexual Activity   Alcohol Use No     Social History     Tobacco Use   Smoking Status Current Every Day Smoker    Packs/day: 1 00    Types: Cigarettes   Smokeless Tobacco Never Used     Social History     Substance and Sexual Activity   Drug Use Yes    Types: Marijuana    Comment: daily        Family History:    History reviewed  No pertinent family history  Physical Exam:     Vitals:   Blood Pressure: 111/68 (03/01/19 0520)  Pulse: 74 (03/01/19 0520)  Temperature: 97 5 °F (36 4 °C) (03/01/19 0520)  Temp Source: Temporal (03/01/19 0520)  Respirations: 18 (03/01/19 0520)  Height: 5' 10" (177 8 cm) (03/01/19 0522)  Weight - Scale: 98 9 kg (218 lb) (03/01/19 0522)  SpO2: 93 % (03/01/19 0520)    Physical Exam   Constitutional: He is oriented to person, place, and time  He appears well-developed and well-nourished  No distress  HENT:   Head: Normocephalic and atraumatic  Eyes: EOM are normal    Neck: Neck supple  Pulmonary/Chest: Effort normal  No stridor  No respiratory distress  He has wheezes  He has no rales  Abdominal: Soft  Bowel sounds are normal  He exhibits no distension and no mass  There is tenderness  There is guarding  Generalized tenderness on palpation   Musculoskeletal: He exhibits no edema  Neurological: He is alert and oriented to person, place, and time  Skin: Skin is warm and dry  He is not diaphoretic  Additional Data:     Lab Results: I have personally reviewed pertinent reports  Results from last 7 days   Lab Units 03/01/19  0244   WBC Thousand/uL 5 64   HEMOGLOBIN g/dL 14 9   HEMATOCRIT % 44 9   PLATELETS Thousands/uL 137*   NEUTROS PCT % 72   LYMPHS PCT % 18   MONOS PCT % 7   EOS PCT % 2     Results from last 7 days   Lab Units 03/01/19  0244   SODIUM mmol/L 134*   POTASSIUM mmol/L 3 1*   CHLORIDE mmol/L 95*   CO2 mmol/L 27   BUN mg/dL 19   CREATININE mg/dL 0 89   ANION GAP mmol/L 12   CALCIUM mg/dL 9 1   ALBUMIN g/dL 3 9   TOTAL BILIRUBIN mg/dL 0 50   ALK PHOS U/L 89   ALT U/L 26   AST U/L 20   GLUCOSE RANDOM mg/dL 272*                 Results from last 7 days   Lab Units 03/01/19  0244   LACTIC ACID mmol/L 1 7       Imaging: I have personally reviewed pertinent reports  CT abdomen pelvis with contrast   Final Result by Elaine Dick MD (03/01 0406)      Acute on chronic pancreatitis  Nonobstructing stones are noted in the right kidney measuring up to 5 mm  No hydronephrosis  There is rectus diastases  A bowel loop is tethered to the infraumbilical midline fascia anteriorly     No small bowel obstruction  Workstation performed: [id number]             EKG, Pathology, and Other Studies Reviewed on Admission:   · CT    Allscripts / Epic Records Reviewed: Yes     ** Please Note: This note has been constructed using a voice recognition system   **

## 2019-03-01 NOTE — CONSULTS
Patient MRN: [id number]  Date of Service: 3/1/2019  Referring Physician: Dr Ramirez Darnell  Provider Creating Note: Talitha Harada, CRNP  PCP: Terra Coates  Reason for Consult:  Acute on chronic pancreatitis  GUERA Tirado is a 34 y o  male who was admitted with Acute on chronic pancreatitis ([facility] )  He has a history of recurrent pancreatitis secondary to hypertriglyceridemia  He has a history of partial pancreatectomy in 2015  He is currently on lavage the and Creon  He states that his diabetes is well controlled  He had presented to the hospital with chief complaint of severe abdominal pain associated with nausea and vomiting and diarrhea x3 days  He does use marijuana at home  He also has a history of asthma and had wheezing upon admission as well  CT of the abdomen showed chronic pancreatitis with atrophy and areas of calcification centered at the tail of the pancreas presumably calcified pseudocyst   Also noted to have fat stranding in the pancreatic body  Past Medical History:   Diagnosis Date    Asthma     Chronic pancreatitis ([facility] )     Diabetes mellitus ([facility] )     Hypertension      Past Surgical History:   Procedure Laterality Date    ABDOMINAL SURGERY      FRACTURE SURGERY       Medications  Home Medications:   Prior to Admission medications    Medication Sig Start Date End Date Taking?  Authorizing Provider   ALBUTEROL IN Inhale 2 puffs every 4 (four) hours as needed 3/16/18  Yes Historical Provider, MD   amLODIPine (NORVASC) 10 mg tablet Take 10 mg by mouth 9/28/18  Yes Historical Provider, MD   carvedilol (COREG) 12 5 mg tablet Take 12 5 mg by mouth 9/28/18 9/28/19 Yes Historical Provider, MD   chlorthalidone 25 mg tablet Take 50 mg by mouth 9/14/18  Yes Historical Provider, MD   fluticasone-salmeterol (ADVAIR DISKUS) 250-50 mcg/dose inhaler Inhale 1 puff 2/15/19 2/15/20 Yes Historical Provider, MD   gemfibrozil (LOPID) 600 mg tablet Take 600 mg by mouth 2 (two) times a day  9/28/18  Yes Historical Provider, MD   metFORMIN (GLUCOPHAGE) 1000 MG tablet Take 1,000 mg by mouth 2/15/19 5/16/19 Yes Historical Provider, MD   omega-3-acid ethyl esters (LOVAZA) 1 g capsule Take 4 g by mouth 10/24/18  Yes Historical Provider, MD   pancrelipase, Lip-Prot-Amyl, (CREON) 12,000 units capsule Take 2 capsules by mouth 9/28/18  Yes Historical Provider, MD   acetaminophen (TYLENOL) 325 mg tablet Take 650 mg by mouth every 6 (six) hours as needed for mild pain    Historical Provider, MD   methocarbamol (ROBAXIN) 500 mg tablet Take 2 tablets (1,000 mg total) by mouth every 8 (eight) hours as needed for muscle spasms for up to 7 days 10/11/18 11/9/18  Royce Bang DO   naproxen (NAPROSYN) 500 mg tablet Take 1 tablet (500 mg total) by mouth 2 (two) times a day with meals 11/9/18   Ronal Mercado PA-C       Inhouse Medications    Current Facility-Administered Medications:     acetaminophen (TYLENOL) tablet 650 mg, 650 mg, Oral, Q6H PRN    albuterol (PROVENTIL HFA,VENTOLIN HFA) inhaler 2 puff, 2 puff, Inhalation, Q4H PRN    amLODIPine (NORVASC) tablet 10 mg, 10 mg, Oral, Daily    carvedilol (COREG) tablet 12 5 mg, 12 5 mg, Oral, BID With Meals    fish oil capsule 1,000 mg, 1,000 mg, Oral, Daily, 1,000 mg at 03/01/19 0853    fluticasone-vilanterol (BREO ELLIPTA) 200-25 MCG/INH inhaler 1 puff, 1 puff, Inhalation, Daily, 1 puff at 03/01/19 0853    gemfibrozil (LOPID) tablet 600 mg, 600 mg, Oral, BID AC, 600 mg at 03/01/19 0853    insulin lispro (HumaLOG) 100 units/mL subcutaneous injection 1-5 Units, 1-5 Units, Subcutaneous, HS    insulin lispro (HumaLOG) 100 units/mL subcutaneous injection 1-6 Units, 1-6 Units, Subcutaneous, TID AC **AND** Fingerstick Glucose (POCT), , , TID AC    levalbuterol (XOPENEX) inhalation solution 0 63 mg, 0 63 mg, Nebulization, Q8H PRN    morphine injection 2 mg, 2 mg, Intravenous, Q6H PRN, 2 mg at 03/01/19 0648    nicotine (NICODERM CQ) 21 mg/24 hr TD 24 hr patch 1 patch, 1 patch, Transdermal, Daily, 1 patch at 03/01/19 0853    ondansetron (ZOFRAN) injection 4 mg, 4 mg, Intravenous, Q6H PRN, 4 mg at 03/01/19 0807    sodium chloride 0 9 % infusion, 125 mL/hr, Intravenous, Continuous, 125 mL/hr at 03/01/19 0450    Allergies  No Known Allergies  Social History   reports that he has been smoking cigarettes  He has been smoking about 1 00 pack per day  He has never used smokeless tobacco  He reports that he has current or past drug history  Drug: Marijuana  He reports that he does not drink alcohol  Family History  History reviewed  No pertinent family history  ROS  ROS: Denies CP, SOB, fever, weight loss  Positive for midepigastric left upper quadrant and right upper quadrant abdominal pain with nausea, vomiting and diarrhea  Positive wheezing  All others negative except as noted in HPI  Objective   Vitals  Blood pressure 110/55, pulse 70, temperature 97 5 °F (36 4 °C), temperature source Temporal, resp  rate 18, height 5' 10" (1 778 m), weight 98 9 kg (218 lb), SpO2 91 %  General: Alert, no apparent distress  Eyes: No scleral icterus  ENT: MMM  Card: RRR no murmur  Lungs:  Coarse lung sounds with expiratory wheezes  Abdomen: Soft  Large mid abdominal scar  Positive midepigastric left upper quadrant and right upper quadrant abdominal tenderness most significant in the left upper quadrant    Bowel sounds present and normoactive   Skin: No jaundice  Neuro: Alert and oriented x3        Laboratory Studies  Lab Results   Component Value Date    WBC 5 64 03/01/2019    HGB 14 9 03/01/2019    HCT 44 9 03/01/2019     (L) 03/01/2019    MCV 82 03/01/2019     Lab Results   Component Value Date    CREATININE 0 89 03/01/2019    BUN 19 03/01/2019    SODIUM 134 (L) 03/01/2019    K 3 1 (L) 03/01/2019    CL 95 (L) 03/01/2019    CO2 27 03/01/2019    GLUCOSE 277 (H) 02/14/2015    CALCIUM 9 1 03/01/2019    PROT 8 2 02/14/2015    ALKPHOS 89 03/01/2019    BILITOT 0 36 02/14/2015    AST 20 03/01/2019 ALT 26 03/01/2019     Lab Results   Component Value Date    PROTIME 14 0 04/06/2014    INR 1 13 04/06/2014       Imaging and Other Studies      Assessment and Plan:  1  Acute on chronic pancreatitis secondary to high triglycerides  Lipid panel is pending  NPO with IV hydration  Check lipase in a m     Lopid and Creon on hold  Patient may need higher dose of Creon when he starts taking oral food  2  Acute respiratory failure with hypoxia  Patient with a history of asthma  Primary following      Principal Problem:    Acute on chronic pancreatitis ([facility] )  Active Problems:    Hypertension    Nephrolithiasis    Hypokalemia    Type 2 diabetes mellitus with hyperglycemia, without long-term current use of insulin (HCC)    Mild intermittent asthma without complication    Acute respiratory failure with hypoxia ([facility] )      JEFF Nguyễn

## 2019-03-01 NOTE — UTILIZATION REVIEW
Initial Clinical Review    Admission: Date/Time/Statement: 3/1/19 @ 0433   Orders Placed This Encounter   Procedures    Inpatient Admission     Standing Status:   Standing     Number of Occurrences:   1     Order Specific Question:   Admitting Physician     Answer:   Linden Rivas     Order Specific Question:   Level of Care     Answer:   Med Surg [16]     Order Specific Question:   Estimated length of stay     Answer:   More than 2 Midnights     Order Specific Question:   Certification     Answer:   I certify that inpatient services are medically necessary for this patient for a duration of greater than two midnights  See H&P and MD Progress Notes for additional information about the patient's course of treatment  ED: Date/Time/Mode of Arrival:   ED Arrival Information     Expected Arrival Acuity Means of Arrival Escorted By Service Admission Type    - 3/1/2019 02:13 Urgent Walk-In Self General Medicine Urgent    Arrival Complaint    abdominal pain        Chief Complaint:   Chief Complaint   Patient presents with    Abdominal Pain     three days of upper abdomen pain, "it feels like my pancreas", NVD reported as well     History of Illness:   Pt  Presents to ED  C/o abd pain, N/V/d  For past  3 days  Pt  Somnolent, arousable to tactile stimuli, and  Will not  Stay awake  During assessment  Unable to  Obtain   Extensive  History  ED Vital Signs:   ED Triage Vitals   Temperature Pulse Respirations Blood Pressure SpO2   03/01/19 0236 03/01/19 0220 03/01/19 0220 03/01/19 0220 03/01/19 0220   98 °F (36 7 °C) 86 18 131/82 96 %      Temp Source Heart Rate Source Patient Position - Orthostatic VS BP Location FiO2 (%)   03/01/19 0520 03/01/19 0449 03/01/19 0449 03/01/19 0449 --   Temporal Monitor Lying Left arm       Pain Score       03/01/19 0220       7        Wt Readings from Last 1 Encounters:   03/01/19 98 9 kg (218 lb)     Vital Signs (abnormal):     Above  sats  Ranging   89-  93  %  On  2 L    Pertinent Labs/Diagnostic Test Results:    NA   134  K  3 1  Chloride   95  BS  272  Lipase   967  Platelets  237  Ct  Abd/pelvis:     Acute on chronic pancreatitis  Nonobstructing stones are noted in the right kidney measuring up to 5 mm   No hydronephrosis  There is rectus diastases   A bowel loop is tethered to the infraumbilical midline fascia anteriorly     No small bowel obstruction  ED Treatment:   Medication Administration from 03/01/2019 0213 to 03/01/2019 0017       Date/Time Order Dose Route Action Action by Comments     03/01/2019 0444 sodium chloride 0 9 % bolus 1,000 mL 0 mL Intravenous Stopped Luis F M Colon, RN      03/01/2019 0250 sodium chloride 0 9 % bolus 1,000 mL 1,000 mL Intravenous New Bag Luis F M Colon, RN      03/01/2019 0444 sodium chloride 0 9 % bolus 1,000 mL 0 mL Intravenous Stopped Luis F M Colon, RN      03/01/2019 0250 sodium chloride 0 9 % bolus 1,000 mL 1,000 mL Intravenous FedEx M Colon, RN      03/01/2019 0241 ketorolac (TORADOL) injection 30 mg 30 mg Intravenous Given Arben Barbone Colon, RN      03/01/2019 0242 HYDROmorphone (DILAUDID) injection 1 mg 1 mg Intravenous Given Luis F M Colon, RN      03/01/2019 0242 famotidine (PEPCID) injection 20 mg 20 mg Intravenous Given Luis F M Colon, RN      03/01/2019 0444 sodium chloride 0 9 % bolus 1,000 mL 0 mL Intravenous Stopped Luis F M Colon, RN      03/01/2019 0244 sodium chloride 0 9 % bolus 1,000 mL 1,000 mL Intravenous New Bag Luis F M Colon, RN      03/01/2019 0345 HYDROmorphone (DILAUDID) injection 1 mg 1 mg Intravenous Given Luis F M Colon, RN      03/01/2019 0341 iohexol (OMNIPAQUE) 350 MG/ML injection (SINGLE-DOSE) 100 mL 100 mL Intravenous Given Sandra Len Toscano      03/01/2019 0450 sodium chloride 0 9 % infusion 125 mL/hr Intravenous FedEx M Colon, RN      03/01/2019 0430 HYDROmorphone (DILAUDID) injection 1 mg 0 mg Intravenous Hold Arben Barbone Colon, RN POSS level        Past Medical/Surgical History:    Active Ambulatory Problems     Diagnosis Date Noted    Hypertension 10/28/2016    Asthma 10/28/2016    Acute on chronic pancreatitis ([facility] ) 04/24/2018    Secondary pancreatic insufficiency 04/24/2018    Chronic thrombosis of splenic vein 04/24/2018    Gastric varices without bleeding 04/24/2018    H/O portal hypertension 04/24/2018    Familial hypertriglyceridemia 04/24/2018     Resolved Ambulatory Problems     Diagnosis Date Noted    No Resolved Ambulatory Problems     Past Medical History:   Diagnosis Date    Asthma     Chronic pancreatitis ([place] [address] )     Diabetes mellitus ([place] [address] )     Hypertension      Admitting Diagnosis: Pancreatitis [K85 90]  Abdominal pain [R10 9]  Age/Sex: 34 y o  male     Assessment/Plan: * Acute on chronic pancreatitis ([facility] )  Assessment & Plan  · H/o acute on chronic pancreatitis secondary to hypertriglyceridemia  · CT:  Acute on chronic pancreatitis  · Lipase 967, trend  · On Lopid and Creon, hold Creon while NPO  · Will obtain lipid panel  · NPO, IVF, pain control  · GI consult     Acute respiratory failure with hypoxia (HCC)  Assessment & Plan  · O2 sat 89% while in ED, patient has wheezing, h/o asthma, on Advair at home  · Will order [facility], [facility] and p r n  Nebs  · Respiratory protocol     Nephrolithiasis  Assessment & Plan  · CT: Nonobstructing stones in the right kidney measuring 5 mm; no hydronephrosis  · Outpatient follow-up with Urology     Mild intermittent asthma without complication  Assessment & Plan  · Maintained on Advair and albuterol HFA, Breo Ellipta while inpatient     Type 2 diabetes mellitus with hyperglycemia, without long-term current use of insulin (HCC)  Assessment & Plan  No results found for: HGBA1C    Hypokalemia  Assessment & Plan  · K 3 1, replete, monitor BMP     Hypertension  Assessment & Plan  · Continue amlodipine and carvedilol    Hold HCTZ     VTE Prophylaxis: Low risk  / reason for no mechanical VTE prophylaxis Ambulate   Code Status: FC  POLST: POLST is not applicable to this patient  Discussion with family:      Anticipated Length of Stay:  Patient will be admitted on an Inpatient basis with an anticipated length of stay of  > 2 midnights  Justification for Hospital Stay: acute on chronic pancreatitis          Admission Orders:   IP  3/1    @   0434  Scheduled Meds:   Current Facility-Administered Medications:  acetaminophen 650 mg Oral Q6H PRN Lynne Peon, CRNP    albuterol 2 puff Inhalation Q4H PRN Lynne Peon, CRNP    amLODIPine 10 mg Oral Daily Lynne Peon, CRNP    carvedilol 12 5 mg Oral BID With Meals Lynne Peon, CRNP    fish oil 1,000 mg Oral Daily Lynne Peon, CRNP    fluticasone-vilanterol 1 puff Inhalation Daily Lynne Peon, CRNP    gemfibrozil 600 mg Oral BID AC Lynne Peon, CRNP    insulin lispro 1-5 Units Subcutaneous HS Lynne Peon, CRNP    insulin lispro 1-6 Units Subcutaneous TID AC Lynne Peon, CRNP    levalbuterol 0 63 mg Nebulization Q8H PRN Lynne Peon, CRNP    morphine injection 2 mg Intravenous Q6H PRN Lynne Peon, CRNP    nicotine 1 patch Transdermal Daily Lynne Peon, CRNP    ondansetron 4 mg Intravenous Q6H PRN Lynne Peon, CRNP    sodium chloride 125 mL/hr Intravenous Continuous Lynne Peon, CRNP Last Rate: 125 mL/hr (03/01/19 0450)     Continuous Infusions:   sodium chloride 125 mL/hr Last Rate: 125 mL/hr (03/01/19 0450)     PRN Meds:   acetaminophen    albuterol    levalbuterol    morphine injection    ondansetron     NPO  IV  zofran PRN ( x1  3/1  Thus far)  IV  MSO4  PRN ( x1  3/1 thus far)    Per  Gi Consult:     Acute on chronic pancreatitis secondary to high triglycerides  Lipid panel is pending  NPO with IV hydration  Check lipase in a m     Lopid and Creon on hold  Patient may need higher dose of Creon when he starts taking oral food  2  Acute respiratory failure with hypoxia  Patient with a history of asthma    Primary following    Network Utilization Review Department  Phone: [phone]; Fax [phone]  [email or web address]  ATTENTION: Please call with any questions or concerns to [phone]  and carefully listen to the prompts so that you are directed to the right person  Send all requests for admission clinical reviews, approved or denied determinations and any other requests to fax [phone]   All voicemails are confidential

## 2019-03-01 NOTE — ASSESSMENT & PLAN NOTE
· O2 sat 89% while in ED, patient has wheezing, h/o asthma, on Advair at home  · Will order [facility], [facility] and p r n   Nebs  · Respiratory protocol

## 2019-03-01 NOTE — ASSESSMENT & PLAN NOTE
No results found for: HGBA1C  · Hold metformin, start Accu-Cheks and sliding scale  No results for input(s): POCGLU in the last 72 hours      Blood Sugar Average: Last 72 hrs:

## 2019-03-01 NOTE — ASSESSMENT & PLAN NOTE
· H/o acute on chronic pancreatitis secondary to hypertriglyceridemia  · CT:  Acute on chronic pancreatitis  · On Lopid and Creon, hold Creon while NPO  · Will obtain lipid panel  · NPO, IVF, pain control  · GI consult

## 2019-03-01 NOTE — ASSESSMENT & PLAN NOTE
· H/o acute on chronic pancreatitis secondary to hypertriglyceridemia  · CT:  Acute on chronic pancreatitis  · Lipase 967, trend  · On Lopid and Creon, hold Creon while NPO  · Will obtain lipid panel  · NPO, IVF, pain control  · GI consult

## 2019-03-01 NOTE — PLAN OF CARE
Problem: PAIN - ADULT  Goal: Verbalizes/displays adequate comfort level or baseline comfort level  Description  Interventions:  - Encourage patient to monitor pain and request assistance  - Assess pain using appropriate pain scale  - Administer analgesics based on type and severity of pain and evaluate response  - Implement non-pharmacological measures as appropriate and evaluate response  - Consider cultural and social influences on pain and pain management  - Notify physician/advanced practitioner if interventions unsuccessful or patient reports new pain  Outcome: Progressing     Problem: INFECTION - ADULT  Goal: Absence or prevention of progression during hospitalization  Description  INTERVENTIONS:  - Assess and monitor for signs and symptoms of infection  - Monitor lab/diagnostic results  - Monitor all insertion sites, i e  indwelling lines, tubes, and drains  - Monitor endotracheal (as able) and nasal secretions for changes in amount and color  - [place] appropriate cooling/warming therapies per order  - Administer medications as ordered  - Instruct and encourage patient and family to use good hand hygiene technique  - Identify and instruct in appropriate isolation precautions for identified infection/condition  Outcome: Progressing  Goal: Absence of fever/infection during neutropenic period  Description  INTERVENTIONS:  - Monitor WBC  - Implement neutropenic guidelines  Outcome: Progressing     Problem: SAFETY ADULT  Goal: Patient will remain free of falls  Description  INTERVENTIONS:  - Assess patient frequently for physical needs  -  Identify cognitive and physical deficits and behaviors that affect risk of falls    -  [place] fall precautions as indicated by assessment   - Educate patient/family on patient safety including physical limitations  - Instruct patient to call for assistance with activity based on assessment  - Modify environment to reduce risk of injury  - Consider OT/PT consult to assist with strengthening/mobility  Outcome: Progressing  Goal: Maintain or return to baseline ADL function  Description  INTERVENTIONS:  -  Assess patient's ability to carry out ADLs; assess patient's baseline for ADL function and identify physical deficits which impact ability to perform ADLs (bathing, care of mouth/teeth, toileting, grooming, dressing, etc )  - Assess/evaluate cause of self-care deficits   - Assess range of motion  - Assess patient's mobility; develop plan if impaired  - Assess patient's need for assistive devices and provide as appropriate  - Encourage maximum independence but intervene and supervise when necessary  ¯ Involve family in performance of ADLs  ¯ Assess for home care needs following discharge   ¯ Request OT consult to assist with ADL evaluation and planning for discharge  ¯ Provide patient education as appropriate  Outcome: Progressing  Goal: Maintain or return mobility status to optimal level  Description  INTERVENTIONS:  - Assess patient's baseline mobility status (ambulation, transfers, stairs, etc )    - Identify cognitive and physical deficits and behaviors that affect mobility  - Identify mobility aids required to assist with transfers and/or ambulation (gait belt, sit-to-stand, lift, walker, cane, etc )  - [place] fall precautions as indicated by assessment  - Record patient progress and toleration of activity level on Mobility SBAR; progress patient to next Phase/Stage  - Instruct patient to call for assistance with activity based on assessment  - Request Rehabilitation consult to assist with strengthening/weightbearing, etc   Outcome: Progressing     Problem: DISCHARGE PLANNING  Goal: Discharge to home or other facility with appropriate resources  Description  INTERVENTIONS:  - Identify barriers to discharge w/patient and caregiver  - Arrange for needed discharge resources and transportation as appropriate  - Identify discharge learning needs (meds, wound care, etc )  - Arrange for interpretive services to assist at discharge as needed  - Refer to Case Management Department for coordinating discharge planning if the patient needs post-hospital services based on physician/advanced practitioner order or complex needs related to functional status, cognitive ability, or social support system  Outcome: Progressing     Problem: Knowledge Deficit  Goal: Patient/family/caregiver demonstrates understanding of disease process, treatment plan, medications, and discharge instructions  Description  Complete learning assessment and assess knowledge base    Interventions:  - Provide teaching at level of understanding  - Provide teaching via preferred learning methods  Outcome: Progressing

## 2019-03-02 LAB
ANION GAP SERPL CALCULATED.3IONS-SCNC: 9 MMOL/L (ref 4–13)
BASOPHILS # BLD AUTO: 0.02 THOUSANDS/ΜL (ref 0–0.1)
BASOPHILS NFR BLD AUTO: 1 % (ref 0–1)
BUN SERPL-MCNC: 9 MG/DL (ref 5–25)
CALCIUM SERPL-MCNC: 8.5 MG/DL (ref 8.3–10.1)
CHLORIDE SERPL-SCNC: 98 MMOL/L (ref 100–108)
CHOLEST SERPL-MCNC: 174 MG/DL (ref 50–200)
CO2 SERPL-SCNC: 30 MMOL/L (ref 21–32)
CREAT SERPL-MCNC: 0.63 MG/DL (ref 0.6–1.3)
EOSINOPHIL # BLD AUTO: 0.08 THOUSAND/ΜL (ref 0–0.61)
EOSINOPHIL NFR BLD AUTO: 2 % (ref 0–6)
ERYTHROCYTE [DISTWIDTH] IN BLOOD BY AUTOMATED COUNT: 12.7 % (ref 11.6–15.1)
GFR SERPL CREATININE-BSD FRML MDRD: 123 ML/MIN/1.73SQ M
GLUCOSE SERPL-MCNC: 107 MG/DL (ref 65–140)
GLUCOSE SERPL-MCNC: 138 MG/DL (ref 65–140)
GLUCOSE SERPL-MCNC: 165 MG/DL (ref 65–140)
HCT VFR BLD AUTO: 43.1 % (ref 36.5–49.3)
HDLC SERPL-MCNC: 23 MG/DL (ref 40–60)
HGB BLD-MCNC: 14.2 G/DL (ref 12–17)
IMM GRANULOCYTES # BLD AUTO: 0.01 THOUSAND/UL (ref 0–0.2)
IMM GRANULOCYTES NFR BLD AUTO: 0 % (ref 0–2)
LDLC SERPL DIRECT ASSAY-MCNC: 81 MG/DL (ref 0–100)
LIPASE SERPL-CCNC: 785 U/L (ref 73–393)
LYMPHOCYTES # BLD AUTO: 0.87 THOUSANDS/ΜL (ref 0.6–4.47)
LYMPHOCYTES NFR BLD AUTO: 24 % (ref 14–44)
MCH RBC QN AUTO: 27.5 PG (ref 26.8–34.3)
MCHC RBC AUTO-ENTMCNC: 32.9 G/DL (ref 31.4–37.4)
MCV RBC AUTO: 83 FL (ref 82–98)
MONOCYTES # BLD AUTO: 0.29 THOUSAND/ΜL (ref 0.17–1.22)
MONOCYTES NFR BLD AUTO: 8 % (ref 4–12)
NEUTROPHILS # BLD AUTO: 2.36 THOUSANDS/ΜL (ref 1.85–7.62)
NEUTS SEG NFR BLD AUTO: 65 % (ref 43–75)
NRBC BLD AUTO-RTO: 0 /100 WBCS
PLATELET # BLD AUTO: 119 THOUSANDS/UL (ref 149–390)
PMV BLD AUTO: 9.4 FL (ref 8.9–12.7)
POTASSIUM SERPL-SCNC: 3.1 MMOL/L (ref 3.5–5.3)
RBC # BLD AUTO: 5.17 MILLION/UL (ref 3.88–5.62)
SODIUM SERPL-SCNC: 137 MMOL/L (ref 136–145)
TRIGL SERPL-MCNC: 428 MG/DL
WBC # BLD AUTO: 3.63 THOUSAND/UL (ref 4.31–10.16)

## 2019-03-02 PROCEDURE — 80048 BASIC METABOLIC PNL TOTAL CA: CPT | Performed by: NURSE PRACTITIONER

## 2019-03-02 PROCEDURE — 85025 COMPLETE CBC W/AUTO DIFF WBC: CPT | Performed by: NURSE PRACTITIONER

## 2019-03-02 PROCEDURE — 99232 SBSQ HOSP IP/OBS MODERATE 35: CPT | Performed by: INTERNAL MEDICINE

## 2019-03-02 PROCEDURE — 82948 REAGENT STRIP/BLOOD GLUCOSE: CPT

## 2019-03-02 PROCEDURE — 83721 ASSAY OF BLOOD LIPOPROTEIN: CPT | Performed by: NURSE PRACTITIONER

## 2019-03-02 PROCEDURE — 80061 LIPID PANEL: CPT | Performed by: NURSE PRACTITIONER

## 2019-03-02 PROCEDURE — 83690 ASSAY OF LIPASE: CPT | Performed by: NURSE PRACTITIONER

## 2019-03-02 RX ADMIN — Medication 1000 MG: at 09:00

## 2019-03-02 RX ADMIN — SODIUM CHLORIDE AND POTASSIUM CHLORIDE 100 ML/HR: .9; .15 SOLUTION INTRAVENOUS at 01:09

## 2019-03-02 RX ADMIN — MORPHINE SULFATE 2 MG: 2 INJECTION, SOLUTION INTRAMUSCULAR; INTRAVENOUS at 20:08

## 2019-03-02 RX ADMIN — CARVEDILOL 12.5 MG: 6.25 TABLET, FILM COATED ORAL at 16:28

## 2019-03-02 RX ADMIN — FLUTICASONE FUROATE AND VILANTEROL TRIFENATATE 1 PUFF: 200; 25 POWDER RESPIRATORY (INHALATION) at 09:03

## 2019-03-02 RX ADMIN — MORPHINE SULFATE 2 MG: 2 INJECTION, SOLUTION INTRAMUSCULAR; INTRAVENOUS at 01:14

## 2019-03-02 RX ADMIN — MORPHINE SULFATE 2 MG: 2 INJECTION, SOLUTION INTRAMUSCULAR; INTRAVENOUS at 12:24

## 2019-03-02 RX ADMIN — MORPHINE SULFATE 2 MG: 2 INJECTION, SOLUTION INTRAMUSCULAR; INTRAVENOUS at 02:00

## 2019-03-02 RX ADMIN — AMLODIPINE BESYLATE 10 MG: 10 TABLET ORAL at 09:00

## 2019-03-02 RX ADMIN — MORPHINE SULFATE 2 MG: 2 INJECTION, SOLUTION INTRAMUSCULAR; INTRAVENOUS at 09:03

## 2019-03-02 RX ADMIN — CARVEDILOL 12.5 MG: 6.25 TABLET, FILM COATED ORAL at 09:00

## 2019-03-02 RX ADMIN — MORPHINE SULFATE 2 MG: 2 INJECTION, SOLUTION INTRAMUSCULAR; INTRAVENOUS at 05:14

## 2019-03-02 RX ADMIN — GEMFIBROZIL 600 MG: 600 TABLET ORAL at 16:28

## 2019-03-02 RX ADMIN — SODIUM CHLORIDE AND POTASSIUM CHLORIDE 100 ML/HR: .9; .15 SOLUTION INTRAVENOUS at 18:00

## 2019-03-02 RX ADMIN — MORPHINE SULFATE 2 MG: 2 INJECTION, SOLUTION INTRAMUSCULAR; INTRAVENOUS at 15:58

## 2019-03-02 RX ADMIN — NICOTINE 1 PATCH: 21 PATCH TRANSDERMAL at 09:00

## 2019-03-02 RX ADMIN — GEMFIBROZIL 600 MG: 600 TABLET ORAL at 06:04

## 2019-03-02 NOTE — PROGRESS NOTES
[facility] Internal [facility] Progress Note  Patient: Elder Monsivais 34 y o  male   MRN: [id number]  PCP: Tung Prabhakar MD  Unit/Bed#: E5 -26 Encounter: [id number]  Date Of Visit: 19    Assessment:    Principal Problem:    Acute on chronic pancreatitis ([facility] )  Active Problems:    Hypertension    Nephrolithiasis    Hypokalemia    Type 2 diabetes mellitus with hyperglycemia, without long-term current use of insulin ([facility])    Mild intermittent asthma without complication    Acute respiratory failure with hypoxia ([facility] )      Plan:    · Acute on chronic pancreatitis improving pain with NPO status and lipase now trending down to 700s will advance diet to clears spoke with GI/restart Creon once tolerating diet  · Hypertriglyceridemia as etiology to recurrent pancreatitis remains fairly well controlled on Lopid which will be continued  · Type 2 diabetes mellitus on long-term insulin sliding scale coverage based on lack of oral   · Mild intermittent asthma stable  · Essential hypertension continue amlodipine and carvedilol      VTE Pharmacologic Prophylaxis:   Pharmacologic: Pharmacologic VTE Prophylaxis contraindicated due to Low risk  Mechanical VTE Prophylaxis in Place: Yes    Discussions with Specialists or Other Care Team Provider:  Spoke to GI    Time Spent for Care: 45 minutes  More than 50% of total time spent on counseling and coordination of care as described above  Subjective:   Admitting to less pain and anxious to restart diet no emesis    Objective:     Vitals:   Temp (24hrs), Av 9 °F (36 6 °C), Min:97 8 °F (36 6 °C), Max:98 1 °F (36 7 °C)    Temp:  [97 8 °F (36 6 °C)-98 1 °F (36 7 °C)] 97 8 °F (36 6 °C)  HR:  [60-77] 77  Resp:  [18] 18  BP: (100-137)/(60-83) 136/83  SpO2:  [89 %-93 %] 93 %  Body mass index is 31 28 kg/m²  Input and Output Summary (last 24 hours):        Intake/Output Summary (Last 24 hours) at 3/2/2019 0959  Last data filed at 3/2/2019 0109  Gross per 24 hour   Intake 900 ml   Output    Net 900 ml       Physical Exam:     Physical Exam:   General appearance: alert, appears stated age and cooperative  Head: Normocephalic, without obvious abnormality, atraumatic  Lungs: clear to auscultation bilaterally  Heart: regular rate and rhythm  Abdomen: soft, non-tender; bowel sounds normal; no masses,  no organomegaly ventral hernia incisional hernia  Back: negative  Extremities: extremities normal, atraumatic, no cyanosis or edema  Neurologic: Grossly normal      Additional Data:     Labs:    Results from last 7 days   Lab Units 03/02/19  0513   WBC Thousand/uL 3 63*   HEMOGLOBIN g/dL 14 2   HEMATOCRIT % 43 1   PLATELETS Thousands/uL 119*   NEUTROS PCT % 65   LYMPHS PCT % 24   MONOS PCT % 8   EOS PCT % 2     Results from last 7 days   Lab Units 03/02/19 0513 03/01/19  0244   POTASSIUM mmol/L 3 1* 3 1*   CHLORIDE mmol/L 98* 95*   CO2 mmol/L 30 27   BUN mg/dL 9 19   CREATININE mg/dL 0 63 0 89   CALCIUM mg/dL 8 5 9 1   ALK PHOS U/L  --  89   ALT U/L  --  26   AST U/L  --  20           * I Have Reviewed All Lab Data Listed Above  * Additional Pertinent Lab Tests Reviewed: All Labs For Current Hospital Admission Reviewed    Imaging:  Ct Abdomen Pelvis With Contrast    Result Date: 3/1/2019  Narrative: CT ABDOMEN AND PELVIS WITH IV CONTRAST INDICATION:   upper abd pain, feels distended, lipase 950, known chronic pancreatitis  COMPARISON:  November 9, 2018 TECHNIQUE:  CT examination of the abdomen and pelvis was performed  Axial, sagittal, and coronal 2D reformatted images were created from the source data and submitted for interpretation  Radiation dose length product (DLP) for this visit:  915 mGy-cm   This examination, like all CT scans performed in the [facility], was performed utilizing techniques to minimize radiation dose exposure, including the use of iterative reconstruction and automated exposure control   IV Contrast:  100 mL of iohexol (OMNIPAQUE) Enteric Contrast: Enteric contrast was not administered  FINDINGS: ABDOMEN LOWER CHEST:  No clinically significant abnormality identified in the visualized lower chest  LIVER/BILIARY TREE:  Liver is diffusely decreased in density consistent with fatty change  No CT evidence of suspicious hepatic mass  Normal hepatic contours  No biliary dilatation  GALLBLADDER:  No calcified gallstones  No pericholecystic inflammatory change  SPLEEN:  Unremarkable  PANCREAS:  Redemonstration of sequela of chronic pancreatitis with atrophy and areas of calcification centered at the tail of the pancreas, presumably calcified pseudocyst   Redemonstration of occluded splenic vein  Fat stranding is noted in the pancreatic body (2:24)  Surgical clips are noted in the mid abdomen  ADRENAL GLANDS:  Unremarkable  KIDNEYS/URETERS:  One or more simple renal cyst(s) is noted  Nonobstructing stones are seen in the kidneys bilaterally measuring up to 5 mm  No hydronephrosis  STOMACH AND BOWEL:  Unremarkable  APPENDIX:  A normal appendix was visualized  ABDOMINOPELVIC CAVITY:  No ascites or free intraperitoneal air  No lymphadenopathy  VESSELS:  Unremarkable for patient's age  PELVIS REPRODUCTIVE ORGANS:  Unremarkable for patient's age  URINARY BLADDER:  Unremarkable  ABDOMINAL WALL/INGUINAL REGIONS:  Rectus diastases  A bowel loop is tethered to the infraumbilical midline fascia    OSSEOUS STRUCTURES:  No acute fracture or destructive osseous lesion  Impression: Acute on chronic pancreatitis  Nonobstructing stones are noted in the right kidney measuring up to 5 mm  No hydronephrosis  There is rectus diastases  A bowel loop is tethered to the infraumbilical midline fascia anteriorly     No small bowel obstruction   Workstation performed: [id number]     Imaging Reports Reviewed Today Include:  Reviewed CT abdomen pelvis with IV contrast  Imaging Personally Reviewed by Myself Includes:    Procedure: Ct Abdomen Pelvis With Contrast    Result Date: 3/1/2019  Narrative: CT ABDOMEN AND PELVIS WITH IV CONTRAST INDICATION:   upper abd pain, feels distended, lipase 950, known chronic pancreatitis  COMPARISON:  November 9, 2018 TECHNIQUE:  CT examination of the abdomen and pelvis was performed  Axial, sagittal, and coronal 2D reformatted images were created from the source data and submitted for interpretation  Radiation dose length product (DLP) for this visit:  915 mGy-cm   This examination, like all CT scans performed in the [facility], was performed utilizing techniques to minimize radiation dose exposure, including the use of iterative reconstruction and automated exposure control  IV Contrast:  100 mL of iohexol (OMNIPAQUE) Enteric Contrast:  Enteric contrast was not administered  FINDINGS: ABDOMEN LOWER CHEST:  No clinically significant abnormality identified in the visualized lower chest  LIVER/BILIARY TREE:  Liver is diffusely decreased in density consistent with fatty change  No CT evidence of suspicious hepatic mass  Normal hepatic contours  No biliary dilatation  GALLBLADDER:  No calcified gallstones  No pericholecystic inflammatory change  SPLEEN:  Unremarkable  PANCREAS:  Redemonstration of sequela of chronic pancreatitis with atrophy and areas of calcification centered at the tail of the pancreas, presumably calcified pseudocyst   Redemonstration of occluded splenic vein  Fat stranding is noted in the pancreatic body (2:24)  Surgical clips are noted in the mid abdomen  ADRENAL GLANDS:  Unremarkable  KIDNEYS/URETERS:  One or more simple renal cyst(s) is noted  Nonobstructing stones are seen in the kidneys bilaterally measuring up to 5 mm  No hydronephrosis  STOMACH AND BOWEL:  Unremarkable  APPENDIX:  A normal appendix was visualized  ABDOMINOPELVIC CAVITY:  No ascites or free intraperitoneal air  No lymphadenopathy  VESSELS:  Unremarkable for patient's age  PELVIS REPRODUCTIVE ORGANS:  Unremarkable for patient's age   URINARY BLADDER:  Unremarkable  ABDOMINAL WALL/INGUINAL REGIONS:  Rectus diastases  A bowel loop is tethered to the infraumbilical midline fascia    OSSEOUS STRUCTURES:  No acute fracture or destructive osseous lesion  Impression: Acute on chronic pancreatitis  Nonobstructing stones are noted in the right kidney measuring up to 5 mm  No hydronephrosis  There is rectus diastases  A bowel loop is tethered to the infraumbilical midline fascia anteriorly     No small bowel obstruction  Workstation performed: [id number]        Recent Cultures (last 7 days):           Last 24 Hours Medication List:     Current Facility-Administered Medications:  acetaminophen 650 mg Oral Q6H PRN Vianne Hives, CRNP    albuterol 2 puff Inhalation Q4H PRN Vianne Hives, CRNP    amLODIPine 10 mg Oral Daily Vianne Hives, CRNP    carvedilol 12 5 mg Oral BID With Meals Viamaricruze Hives, CRNP    fish oil 1,000 mg Oral Daily Vianne Hives, CRNP    fluticasone-vilanterol 1 puff Inhalation Daily Viamaricruze Hives, CRNP    gemfibrozil 600 mg Oral BID AC Vianne Hives, CRNP    insulin lispro 1-5 Units Subcutaneous HS Vianne Hives, CRNP    insulin lispro 1-6 Units Subcutaneous TID AC Vianne Hives, CRNP    levalbuterol 0 63 mg Nebulization Q8H PRN Viamaricruze Hivjeancarlos, CRNP    morphine injection 2 mg Intravenous Q3H PRN Shira Dolan MD    nicotine 1 patch Transdermal Daily Viamaricruze Hives, CRNP    ondansetron 4 mg Intravenous Q6H PRN Viakeyona Hives, CRNP    sodium chloride 0 9 % with KCl 20 mEq/L 100 mL/hr Intravenous Continuous Shira Dolan MD Last Rate: 100 mL/hr (03/02/19 0109)        Today, Patient Was Seen By: Shira Dolan MD    ** Please Note: Dragon 360 Dictation voice to text software may have been used in the creation of this document   **

## 2019-03-03 VITALS
WEIGHT: 218 LBS | OXYGEN SATURATION: 94 % | SYSTOLIC BLOOD PRESSURE: 120 MMHG | BODY MASS INDEX: 31.21 KG/M2 | DIASTOLIC BLOOD PRESSURE: 83 MMHG | TEMPERATURE: 97.2 F | RESPIRATION RATE: 18 BRPM | HEART RATE: 66 BPM | HEIGHT: 70 IN

## 2019-03-03 LAB
GLUCOSE SERPL-MCNC: 157 MG/DL (ref 65–140)
LIPASE SERPL-CCNC: 750 U/L (ref 73–393)

## 2019-03-03 PROCEDURE — 99239 HOSP IP/OBS DSCHRG MGMT >30: CPT | Performed by: INTERNAL MEDICINE

## 2019-03-03 PROCEDURE — 83690 ASSAY OF LIPASE: CPT | Performed by: INTERNAL MEDICINE

## 2019-03-03 PROCEDURE — 82948 REAGENT STRIP/BLOOD GLUCOSE: CPT

## 2019-03-03 RX ORDER — OXYCODONE HYDROCHLORIDE AND ACETAMINOPHEN 5; 325 MG/1; MG/1
1 TABLET ORAL EVERY 8 HOURS PRN
Qty: 10 TABLET | Refills: 0 | Status: SHIPPED | OUTPATIENT
Start: 2019-03-03 | End: 2019-03-06

## 2019-03-03 RX ADMIN — Medication 1000 MG: at 09:01

## 2019-03-03 RX ADMIN — PANCRELIPASE 24000 UNITS: 24000; 76000; 120000 CAPSULE, DELAYED RELEASE PELLETS ORAL at 09:45

## 2019-03-03 RX ADMIN — CARVEDILOL 12.5 MG: 6.25 TABLET, FILM COATED ORAL at 09:01

## 2019-03-03 RX ADMIN — NICOTINE 1 PATCH: 21 PATCH TRANSDERMAL at 09:02

## 2019-03-03 RX ADMIN — MORPHINE SULFATE 2 MG: 2 INJECTION, SOLUTION INTRAMUSCULAR; INTRAVENOUS at 09:01

## 2019-03-03 RX ADMIN — MORPHINE SULFATE 2 MG: 2 INJECTION, SOLUTION INTRAMUSCULAR; INTRAVENOUS at 01:33

## 2019-03-03 RX ADMIN — MORPHINE SULFATE 2 MG: 2 INJECTION, SOLUTION INTRAMUSCULAR; INTRAVENOUS at 05:57

## 2019-03-03 RX ADMIN — GEMFIBROZIL 600 MG: 600 TABLET ORAL at 09:01

## 2019-03-03 RX ADMIN — AMLODIPINE BESYLATE 10 MG: 10 TABLET ORAL at 09:02

## 2019-03-03 RX ADMIN — FLUTICASONE FUROATE AND VILANTEROL TRIFENATATE 1 PUFF: 200; 25 POWDER RESPIRATORY (INHALATION) at 09:02

## 2019-03-03 NOTE — PLAN OF CARE
Problem: PAIN - ADULT  Goal: Verbalizes/displays adequate comfort level or baseline comfort level  Description  Interventions:  - Encourage patient to monitor pain and request assistance  - Assess pain using appropriate pain scale  - Administer analgesics based on type and severity of pain and evaluate response  - Implement non-pharmacological measures as appropriate and evaluate response  - Consider cultural and social influences on pain and pain management  - Notify physician/advanced practitioner if interventions unsuccessful or patient reports new pain  3/3/2019 0938 by Cheryl Avila RN  Outcome: Adequate for Discharge  3/3/2019 0741 by Cheryl Avila RN  Outcome: Progressing     Problem: INFECTION - ADULT  Goal: Absence or prevention of progression during hospitalization  Description  INTERVENTIONS:  - Assess and monitor for signs and symptoms of infection  - Monitor lab/diagnostic results  - Monitor all insertion sites, i e  indwelling lines, tubes, and drains  - Monitor endotracheal (as able) and nasal secretions for changes in amount and color  - [place] appropriate cooling/warming therapies per order  - Administer medications as ordered  - Instruct and encourage patient and family to use good hand hygiene technique  - Identify and instruct in appropriate isolation precautions for identified infection/condition  3/3/2019 0938 by Cheryl Avila RN  Outcome: Adequate for Discharge  3/3/2019 0741 by Cheryl Avila RN  Outcome: Progressing  Goal: Absence of fever/infection during neutropenic period  Description  INTERVENTIONS:  - Monitor WBC  - Implement neutropenic guidelines  3/3/2019 0938 by Cheryl Avila RN  Outcome: Adequate for Discharge  3/3/2019 0741 by Cheryl Avila RN  Outcome: Progressing     Problem: SAFETY ADULT  Goal: Patient will remain free of falls  Description  INTERVENTIONS:  - Assess patient frequently for physical needs  -  Identify cognitive and physical deficits and behaviors that affect risk of falls    -  [place] fall precautions as indicated by assessment   - Educate patient/family on patient safety including physical limitations  - Instruct patient to call for assistance with activity based on assessment  - Modify environment to reduce risk of injury  - Consider OT/PT consult to assist with strengthening/mobility  3/3/2019 0938 by Regan Alva RN  Outcome: Adequate for Discharge  3/3/2019 0741 by Regan Alva RN  Outcome: Progressing  Goal: Maintain or return to baseline ADL function  Description  INTERVENTIONS:  -  Assess patient's ability to carry out ADLs; assess patient's baseline for ADL function and identify physical deficits which impact ability to perform ADLs (bathing, care of mouth/teeth, toileting, grooming, dressing, etc )  - Assess/evaluate cause of self-care deficits   - Assess range of motion  - Assess patient's mobility; develop plan if impaired  - Assess patient's need for assistive devices and provide as appropriate  - Encourage maximum independence but intervene and supervise when necessary  ¯ Involve family in performance of ADLs  ¯ Assess for home care needs following discharge   ¯ Request OT consult to assist with ADL evaluation and planning for discharge  ¯ Provide patient education as appropriate  3/3/2019 0938 by Regan Alva RN  Outcome: Adequate for Discharge  3/3/2019 0741 by Regan Alva RN  Outcome: Progressing  Goal: Maintain or return mobility status to optimal level  Description  INTERVENTIONS:  - Assess patient's baseline mobility status (ambulation, transfers, stairs, etc )    - Identify cognitive and physical deficits and behaviors that affect mobility  - Identify mobility aids required to assist with transfers and/or ambulation (gait belt, sit-to-stand, lift, walker, cane, etc )  - [place] fall precautions as indicated by assessment  - Record patient progress and toleration of activity level on Mobility SBAR; progress patient to next Phase/Stage  - Instruct patient to call for assistance with activity based on assessment  - Request Rehabilitation consult to assist with strengthening/weightbearing, etc   3/3/2019 0938 by Nafisa Gamez RN  Outcome: Adequate for Discharge  3/3/2019 0741 by Nafisa Gamez RN  Outcome: Progressing     Problem: DISCHARGE PLANNING  Goal: Discharge to home or other facility with appropriate resources  Description  INTERVENTIONS:  - Identify barriers to discharge w/patient and caregiver  - Arrange for needed discharge resources and transportation as appropriate  - Identify discharge learning needs (meds, wound care, etc )  - Arrange for interpretive services to assist at discharge as needed  - Refer to Case Management Department for coordinating discharge planning if the patient needs post-hospital services based on physician/advanced practitioner order or complex needs related to functional status, cognitive ability, or social support system  3/3/2019 0938 by Nafisa Gamez RN  Outcome: Adequate for Discharge  3/3/2019 0741 by Nafisa Gamez RN  Outcome: Progressing     Problem: Knowledge Deficit  Goal: Patient/family/caregiver demonstrates understanding of disease process, treatment plan, medications, and discharge instructions  Description  Complete learning assessment and assess knowledge base  Interventions:  - Provide teaching at level of understanding  - Provide teaching via preferred learning methods  3/3/2019 0938 by Nafisa Gamez RN  Outcome: Adequate for Discharge  3/3/2019 0741 by Nafisa Gamez RN  Outcome: Progressing     Problem: Nutrition/Hydration-ADULT  Goal: Nutrient/Hydration intake appropriate for improving, restoring or maintaining nutritional needs  Description  Monitor and assess patient's nutrition/hydration status for malnutrition (ex- brittle hair, bruises, dry skin, pale skin and conjunctiva, muscle wasting, smooth red tongue, and disorientation)   Collaborate with interdisciplinary team and initiate plan and interventions as ordered  Monitor patient's weight and dietary intake as ordered or per policy  Utilize nutrition screening tool and intervene per policy  Determine patient's food preferences and provide high-protein, high-caloric foods as appropriate  INTERVENTIONS:  - Monitor oral intake, urinary output, labs, and treatment plans  - Assess nutrition and hydration status and recommend course of action  - Evaluate amount of meals eaten  - Assist patient with eating if necessary   - Allow adequate time for meals  - Recommend/ encourage appropriate diets, oral nutritional supplements, and vitamin/mineral supplements  - Order, calculate, and assess calorie counts as needed  - Recommend, monitor, and adjust tube feedings and TPN/PPN based on assessed needs  - Assess need for intravenous fluids  - Provide specific nutrition/hydration education as appropriate  - Include patient/family/caregiver in decisions related to nutrition  3/3/2019 0938 by Annette Marquis RN  Outcome: Adequate for Discharge  3/3/2019 0741 by Annette Marquis RN  Outcome: Progressing     Problem: Potential for Falls  Goal: Patient will remain free of falls  Description  INTERVENTIONS:  - Assess patient frequently for physical needs  -  Identify cognitive and physical deficits and behaviors that affect risk of falls    -  [place] fall precautions as indicated by assessment   - Educate patient/family on patient safety including physical limitations  - Instruct patient to call for assistance with activity based on assessment  - Modify environment to reduce risk of injury  - Consider OT/PT consult to assist with strengthening/mobility  3/3/2019 0938 by Annette Marquis RN  Outcome: Adequate for Discharge  3/3/2019 0741 by Annette Marquis RN  Outcome: Progressing     Problem: GASTROINTESTINAL - ADULT  Goal: Minimal or absence of nausea and/or vomiting  Description  INTERVENTIONS:  - Administer IV fluids as ordered to ensure adequate hydration  - Maintain NPO status until nausea and vomiting are resolved  - Nasogastric tube as ordered  - Administer ordered antiemetic medications as needed  - Provide nonpharmacologic comfort measures as appropriate  - Advance diet as tolerated, if ordered  - Nutrition services referral to assist patient with adequate nutrition and appropriate food choices  3/3/2019 0938 by Eligio Gibbs RN  Outcome: Adequate for Discharge  3/3/2019 0741 by Eligio Gibbs RN  Outcome: Progressing  Goal: Maintains or returns to baseline bowel function  Description  INTERVENTIONS:  - Assess bowel function  - Encourage oral fluids to ensure adequate hydration  - Administer IV fluids as ordered to ensure adequate hydration  - Administer ordered medications as needed  - Encourage mobilization and activity  - Nutrition services referral to assist patient with appropriate food choices  3/3/2019 0938 by Eligio Gibbs RN  Outcome: Adequate for Discharge  3/3/2019 0741 by Eligio Gibbs RN  Outcome: Progressing  Goal: Maintains adequate nutritional intake  Description  INTERVENTIONS:  - Monitor percentage of each meal consumed  - Identify factors contributing to decreased intake, treat as appropriate  - Assist with meals as needed  - Monitor I&O, WT and lab values  - Obtain nutrition services referral as needed  3/3/2019 0938 by Eligio Gibbs RN  Outcome: Adequate for Discharge  3/3/2019 0741 by Eligio Gibbs RN  Outcome: Progressing     Problem: METABOLIC, FLUID AND ELECTROLYTES - ADULT  Goal: Electrolytes maintained within normal limits  Description  INTERVENTIONS:  - Monitor labs and assess patient for signs and symptoms of electrolyte imbalances  - Administer electrolyte replacement as ordered  - Monitor response to electrolyte replacements, including repeat lab results as appropriate  - Instruct patient on fluid and nutrition as appropriate  3/3/2019 0938 by Eligio Gibsb RN  Outcome: Adequate for Discharge  3/3/2019 0741 by Terra Naqvi RN  Outcome: Progressing  Goal: Fluid balance maintained  Description  INTERVENTIONS:  - Monitor labs and assess for signs and symptoms of volume excess or deficit  - Monitor I/O and WT  - Instruct patient on fluid and nutrition as appropriate  3/3/2019 0938 by Terra Naqvi RN  Outcome: Adequate for Discharge  3/3/2019 0741 by Terra Naqvi RN  Outcome: Progressing  Goal: Glucose maintained within target range  Description  INTERVENTIONS:  - Monitor Blood Glucose as ordered  - Assess for signs and symptoms of hyperglycemia and hypoglycemia  - Administer ordered medications to maintain glucose within target range  - Assess nutritional intake and initiate nutrition service referral as needed  3/3/2019 0938 by Terra Naqvi RN  Outcome: Adequate for Discharge  3/3/2019 0741 by Terra Naqvi RN  Outcome: Progressing

## 2019-03-03 NOTE — DISCHARGE SUMMARY
Discharge Summary - [facility]    Patient Information: Mera Martel 34 y o  male MRN: [id number]  Unit/Bed#: E5 -01 Encounter: [id number]    Discharging Physician / Practitioner: John Stoner MD  PCP: Joe Villalobos MD  Admission Date: 3/1/2019  Discharge Date: 03/03/19    Reason for Admission:  Abdominal pain    Discharge Diagnoses:  Acute on chronic pancreatitis    Principal Problem:    Acute on chronic pancreatitis [facility]  Active Problems:    Hypertension    Nephrolithiasis    Hypokalemia    Type 2 diabetes mellitus with hyperglycemia, without long-term current use of insulin ([facility])    Mild intermittent asthma without complication    Acute respiratory failure with hypoxia ([facility] [address] )  Resolved Problems:    * No resolved hospital problems  *      Consultations During Hospital Stay:  · Gastroenterology    Procedures Performed:     Ct Abdomen Pelvis With Contrast    Result Date: 3/1/2019  Narrative: CT ABDOMEN AND PELVIS WITH IV CONTRAST INDICATION:   upper abd pain, feels distended, lipase 950, known chronic pancreatitis  COMPARISON:  November 9, 2018 TECHNIQUE:  CT examination of the abdomen and pelvis was performed  Axial, sagittal, and coronal 2D reformatted images were created from the source data and submitted for interpretation  Radiation dose length product (DLP) for this visit:  915 mGy-cm   This examination, like all CT scans performed in the [facility], was performed utilizing techniques to minimize radiation dose exposure, including the use of iterative reconstruction and automated exposure control  IV Contrast:  100 mL of iohexol (OMNIPAQUE) Enteric Contrast:  Enteric contrast was not administered  FINDINGS: ABDOMEN LOWER CHEST:  No clinically significant abnormality identified in the visualized lower chest  LIVER/BILIARY TREE:  Liver is diffusely decreased in density consistent with fatty change  No CT evidence of suspicious hepatic mass    Normal hepatic contours  No biliary dilatation  GALLBLADDER:  No calcified gallstones  No pericholecystic inflammatory change  SPLEEN:  Unremarkable  PANCREAS:  Redemonstration of sequela of chronic pancreatitis with atrophy and areas of calcification centered at the tail of the pancreas, presumably calcified pseudocyst   Redemonstration of occluded splenic vein  Fat stranding is noted in the pancreatic body (2:24)  Surgical clips are noted in the mid abdomen  ADRENAL GLANDS:  Unremarkable  KIDNEYS/URETERS:  One or more simple renal cyst(s) is noted  Nonobstructing stones are seen in the kidneys bilaterally measuring up to 5 mm  No hydronephrosis  STOMACH AND BOWEL:  Unremarkable  APPENDIX:  A normal appendix was visualized  ABDOMINOPELVIC CAVITY:  No ascites or free intraperitoneal air  No lymphadenopathy  VESSELS:  Unremarkable for patient's age  PELVIS REPRODUCTIVE ORGANS:  Unremarkable for patient's age  URINARY BLADDER:  Unremarkable  ABDOMINAL WALL/INGUINAL REGIONS:  Rectus diastases  A bowel loop is tethered to the infraumbilical midline fascia    OSSEOUS STRUCTURES:  No acute fracture or destructive osseous lesion  Impression: Acute on chronic pancreatitis  Nonobstructing stones are noted in the right kidney measuring up to 5 mm  No hydronephrosis  There is rectus diastases  A bowel loop is tethered to the infraumbilical midline fascia anteriorly     No small bowel obstruction  Workstation performed: [id number]         Significant Findings:     · 78-year-old male patient with a known history of recurrent pancreatitis in relation to a history of hypertriglyceridemia for which she is managed with Lopid and takes Creon for each meal   He presented with abdominal pain after having a steak that he usually tries to avoid high fatty foods otherwise    · CT of the abdomen did show chronic pancreatitis with atrophy and areas of calcifications centered at the tail of pancreas presumably in relation to calcified pseudocyst  With fat stranding in the pancreatic body initial lipase level 950 with trending to 740 after tolerance of clear liquids and improved abdominal pain  · Patient is a smoker and still does almost daily usage of medical marijuana to treat his chronic abdominal pain when not at work  · He was given counseling on smoking cessation in place a nicotine replacement therapy  · Seen by the GI service who concurred with the plan of NPO status 1st 24 hours and advanced to clear liquids yesterday advised patient that is smoking as a risk factor for pancreatitis and also pancreatic cancer and as his chronic pancreatitis for pancreatic cancer  It was advise may need a higher dosage of Creon with meals going forward  · With tolerance to clear liquids in no full liquids patient is cleared for discharge I gave him a 3 day supply of oxycodone he is due to return to work 4 days from now    Incidental Findings:   · Splenic vein splenic vein occlusion  · Rectus diaasteses  · Lipid panel showed a total cholesterol 174 with triglycerides of 428 it HDL 23     Test Results Pending at Discharge (will require follow up): · None     Outpatient Tests Requested:  ·     Complications:      Hospital Course:     Darlyn Hendrix is a 34 y o  male patient who originally presented to the hospital on 3/1/2019 due to abdominal pain  Please see above significant findings for hospital course and treatment plan    Condition at Discharge: good     Discharge Day Visit / Exam:     * Please refer to separate progress for these details *    Discharge instructions/Information to patient and family:   See after visit summary for information provided to patient and family  Provisions for Follow-Up Care:  See after visit summary for information related to follow-up care and any pertinent home health orders        Disposition:     Home    For Discharges to [facility] SNF:   · Not Applicable to this Patient - Not Applicable to this Patient      Discharge Statement:  I spent 56 minutes discharging the patient  This time was spent on the day of discharge  I had direct contact with the patient on the day of discharge  Greater than 50% of the total time was spent examining patient, answering all patient questions, arranging and discussing plan of care with patient as well as directly providing post-discharge instructions  Additional time then spent on discharge activities  Discharge Medications:  See after visit summary for reconciled discharge medications provided to patient and family  ** Please Note: Dragon 360 Dictation voice to text software may have been used in the creation of this document   **

## 2019-03-03 NOTE — PLAN OF CARE
Problem: PAIN - ADULT  Goal: Verbalizes/displays adequate comfort level or baseline comfort level  Description  Interventions:  - Encourage patient to monitor pain and request assistance  - Assess pain using appropriate pain scale  - Administer analgesics based on type and severity of pain and evaluate response  - Implement non-pharmacological measures as appropriate and evaluate response  - Consider cultural and social influences on pain and pain management  - Notify physician/advanced practitioner if interventions unsuccessful or patient reports new pain  Outcome: Progressing     Problem: INFECTION - ADULT  Goal: Absence or prevention of progression during hospitalization  Description  INTERVENTIONS:  - Assess and monitor for signs and symptoms of infection  - Monitor lab/diagnostic results  - Monitor all insertion sites, i e  indwelling lines, tubes, and drains  - Monitor endotracheal (as able) and nasal secretions for changes in amount and color  - [place] appropriate cooling/warming therapies per order  - Administer medications as ordered  - Instruct and encourage patient and family to use good hand hygiene technique  - Identify and instruct in appropriate isolation precautions for identified infection/condition  Outcome: Progressing  Goal: Absence of fever/infection during neutropenic period  Description  INTERVENTIONS:  - Monitor WBC  - Implement neutropenic guidelines  Outcome: Progressing     Problem: SAFETY ADULT  Goal: Patient will remain free of falls  Description  INTERVENTIONS:  - Assess patient frequently for physical needs  -  Identify cognitive and physical deficits and behaviors that affect risk of falls    -  [place] fall precautions as indicated by assessment   - Educate patient/family on patient safety including physical limitations  - Instruct patient to call for assistance with activity based on assessment  - Modify environment to reduce risk of injury  - Consider OT/PT consult to assist with strengthening/mobility  Outcome: Progressing  Goal: Maintain or return to baseline ADL function  Description  INTERVENTIONS:  -  Assess patient's ability to carry out ADLs; assess patient's baseline for ADL function and identify physical deficits which impact ability to perform ADLs (bathing, care of mouth/teeth, toileting, grooming, dressing, etc )  - Assess/evaluate cause of self-care deficits   - Assess range of motion  - Assess patient's mobility; develop plan if impaired  - Assess patient's need for assistive devices and provide as appropriate  - Encourage maximum independence but intervene and supervise when necessary  ¯ Involve family in performance of ADLs  ¯ Assess for home care needs following discharge   ¯ Request OT consult to assist with ADL evaluation and planning for discharge  ¯ Provide patient education as appropriate  Outcome: Progressing  Goal: Maintain or return mobility status to optimal level  Description  INTERVENTIONS:  - Assess patient's baseline mobility status (ambulation, transfers, stairs, etc )    - Identify cognitive and physical deficits and behaviors that affect mobility  - Identify mobility aids required to assist with transfers and/or ambulation (gait belt, sit-to-stand, lift, walker, cane, etc )  - [place] fall precautions as indicated by assessment  - Record patient progress and toleration of activity level on Mobility SBAR; progress patient to next Phase/Stage  - Instruct patient to call for assistance with activity based on assessment  - Request Rehabilitation consult to assist with strengthening/weightbearing, etc   Outcome: Progressing     Problem: DISCHARGE PLANNING  Goal: Discharge to home or other facility with appropriate resources  Description  INTERVENTIONS:  - Identify barriers to discharge w/patient and caregiver  - Arrange for needed discharge resources and transportation as appropriate  - Identify discharge learning needs (meds, wound care, etc )  - Arrange for interpretive services to assist at discharge as needed  - Refer to Case Management Department for coordinating discharge planning if the patient needs post-hospital services based on physician/advanced practitioner order or complex needs related to functional status, cognitive ability, or social support system  Outcome: Progressing     Problem: Knowledge Deficit  Goal: Patient/family/caregiver demonstrates understanding of disease process, treatment plan, medications, and discharge instructions  Description  Complete learning assessment and assess knowledge base  Interventions:  - Provide teaching at level of understanding  - Provide teaching via preferred learning methods  Outcome: Progressing     Problem: Nutrition/Hydration-ADULT  Goal: Nutrient/Hydration intake appropriate for improving, restoring or maintaining nutritional needs  Description  Monitor and assess patient's nutrition/hydration status for malnutrition (ex- brittle hair, bruises, dry skin, pale skin and conjunctiva, muscle wasting, smooth red tongue, and disorientation)  Collaborate with interdisciplinary team and initiate plan and interventions as ordered  Monitor patient's weight and dietary intake as ordered or per policy  Utilize nutrition screening tool and intervene per policy  Determine patient's food preferences and provide high-protein, high-caloric foods as appropriate       INTERVENTIONS:  - Monitor oral intake, urinary output, labs, and treatment plans  - Assess nutrition and hydration status and recommend course of action  - Evaluate amount of meals eaten  - Assist patient with eating if necessary   - Allow adequate time for meals  - Recommend/ encourage appropriate diets, oral nutritional supplements, and vitamin/mineral supplements  - Order, calculate, and assess calorie counts as needed  - Recommend, monitor, and adjust tube feedings and TPN/PPN based on assessed needs  - Assess need for intravenous fluids  - Provide specific nutrition/hydration education as appropriate  - Include patient/family/caregiver in decisions related to nutrition  Outcome: Progressing     Problem: Potential for Falls  Goal: Patient will remain free of falls  Description  INTERVENTIONS:  - Assess patient frequently for physical needs  -  Identify cognitive and physical deficits and behaviors that affect risk of falls    -  [place] fall precautions as indicated by assessment   - Educate patient/family on patient safety including physical limitations  - Instruct patient to call for assistance with activity based on assessment  - Modify environment to reduce risk of injury  - Consider OT/PT consult to assist with strengthening/mobility  Outcome: Progressing     Problem: GASTROINTESTINAL - ADULT  Goal: Minimal or absence of nausea and/or vomiting  Description  INTERVENTIONS:  - Administer IV fluids as ordered to ensure adequate hydration  - Maintain NPO status until nausea and vomiting are resolved  - Nasogastric tube as ordered  - Administer ordered antiemetic medications as needed  - Provide nonpharmacologic comfort measures as appropriate  - Advance diet as tolerated, if ordered  - Nutrition services referral to assist patient with adequate nutrition and appropriate food choices  Outcome: Progressing  Goal: Maintains or returns to baseline bowel function  Description  INTERVENTIONS:  - Assess bowel function  - Encourage oral fluids to ensure adequate hydration  - Administer IV fluids as ordered to ensure adequate hydration  - Administer ordered medications as needed  - Encourage mobilization and activity  - Nutrition services referral to assist patient with appropriate food choices  Outcome: Progressing  Goal: Maintains adequate nutritional intake  Description  INTERVENTIONS:  - Monitor percentage of each meal consumed  - Identify factors contributing to decreased intake, treat as appropriate  - Assist with meals as needed  - Monitor I&O, WT and lab values  - Obtain nutrition services referral as needed  Outcome: Progressing     Problem: METABOLIC, FLUID AND ELECTROLYTES - ADULT  Goal: Electrolytes maintained within normal limits  Description  INTERVENTIONS:  - Monitor labs and assess patient for signs and symptoms of electrolyte imbalances  - Administer electrolyte replacement as ordered  - Monitor response to electrolyte replacements, including repeat lab results as appropriate  - Instruct patient on fluid and nutrition as appropriate  Outcome: Progressing  Goal: Fluid balance maintained  Description  INTERVENTIONS:  - Monitor labs and assess for signs and symptoms of volume excess or deficit  - Monitor I/O and WT  - Instruct patient on fluid and nutrition as appropriate  Outcome: Progressing  Goal: Glucose maintained within target range  Description  INTERVENTIONS:  - Monitor Blood Glucose as ordered  - Assess for signs and symptoms of hyperglycemia and hypoglycemia  - Administer ordered medications to maintain glucose within target range  - Assess nutritional intake and initiate nutrition service referral as needed  Outcome: Progressing

## 2019-03-03 NOTE — NURSING NOTE
All d/c instructions were reviewed with the patient  IV taken out  All questions were answered  Belongings were taken  Patient ambulated off unit with PCA in stable condition

## 2019-04-19 ENCOUNTER — HOSPITAL ENCOUNTER (EMERGENCY)
Facility: HOSPITAL | Age: 39
Discharge: LEFT BEFORE DISCHARGE | End: 2019-04-19
Attending: EMERGENCY MEDICINE
Payer: MEDICARE

## 2019-04-19 VITALS
HEART RATE: 89 BPM | RESPIRATION RATE: 18 BRPM | TEMPERATURE: 98.6 F | SYSTOLIC BLOOD PRESSURE: 120 MMHG | BODY MASS INDEX: 31.28 KG/M2 | DIASTOLIC BLOOD PRESSURE: 92 MMHG | OXYGEN SATURATION: 94 % | WEIGHT: 218 LBS

## 2019-04-19 DIAGNOSIS — R10.9 ABDOMINAL PAIN: Primary | ICD-10-CM

## 2019-04-19 DIAGNOSIS — Z76.5 DRUG-SEEKING BEHAVIOR: ICD-10-CM

## 2019-04-19 LAB
ALBUMIN SERPL BCP-MCNC: 4.2 G/DL (ref 3.5–5)
ALP SERPL-CCNC: 84 U/L (ref 46–116)
ALT SERPL W P-5'-P-CCNC: 23 U/L (ref 12–78)
ANION GAP SERPL CALCULATED.3IONS-SCNC: 12 MMOL/L (ref 4–13)
APAP SERPL-MCNC: <2 UG/ML (ref 10–20)
APTT PPP: 32 SECONDS (ref 26–38)
AST SERPL W P-5'-P-CCNC: 14 U/L (ref 5–45)
BASOPHILS # BLD AUTO: 0.02 THOUSANDS/ΜL (ref 0–0.1)
BASOPHILS NFR BLD AUTO: 0 % (ref 0–1)
BILIRUB SERPL-MCNC: 0.48 MG/DL (ref 0.2–1)
BILIRUB UR QL STRIP: NEGATIVE
BUN SERPL-MCNC: 15 MG/DL (ref 5–25)
CALCIUM SERPL-MCNC: 9.3 MG/DL (ref 8.3–10.1)
CHLORIDE SERPL-SCNC: 96 MMOL/L (ref 100–108)
CLARITY UR: CLEAR
CO2 SERPL-SCNC: 30 MMOL/L (ref 21–32)
COLOR UR: YELLOW
CREAT SERPL-MCNC: 0.97 MG/DL (ref 0.6–1.3)
EOSINOPHIL # BLD AUTO: 0.07 THOUSAND/ΜL (ref 0–0.61)
EOSINOPHIL NFR BLD AUTO: 1 % (ref 0–6)
ERYTHROCYTE [DISTWIDTH] IN BLOOD BY AUTOMATED COUNT: 13.5 % (ref 11.6–15.1)
ETHANOL SERPL-MCNC: <3 MG/DL (ref 0–3)
GFR SERPL CREATININE-BSD FRML MDRD: 97 ML/MIN/1.73SQ M
GLUCOSE SERPL-MCNC: 249 MG/DL (ref 65–140)
GLUCOSE UR STRIP-MCNC: NEGATIVE MG/DL
HCT VFR BLD AUTO: 46.4 % (ref 36.5–49.3)
HGB BLD-MCNC: 15.3 G/DL (ref 12–17)
HGB UR QL STRIP.AUTO: NEGATIVE
IMM GRANULOCYTES # BLD AUTO: 0.01 THOUSAND/UL (ref 0–0.2)
IMM GRANULOCYTES NFR BLD AUTO: 0 % (ref 0–2)
INR PPP: 1.08 (ref 0.86–1.17)
KETONES UR STRIP-MCNC: NEGATIVE MG/DL
LEUKOCYTE ESTERASE UR QL STRIP: NEGATIVE
LIPASE SERPL-CCNC: 976 U/L (ref 73–393)
LYMPHOCYTES # BLD AUTO: 1.02 THOUSANDS/ΜL (ref 0.6–4.47)
LYMPHOCYTES NFR BLD AUTO: 21 % (ref 14–44)
MAGNESIUM SERPL-MCNC: 1.8 MG/DL (ref 1.6–2.6)
MCH RBC QN AUTO: 26.7 PG (ref 26.8–34.3)
MCHC RBC AUTO-ENTMCNC: 33 G/DL (ref 31.4–37.4)
MCV RBC AUTO: 81 FL (ref 82–98)
MONOCYTES # BLD AUTO: 0.33 THOUSAND/ΜL (ref 0.17–1.22)
MONOCYTES NFR BLD AUTO: 7 % (ref 4–12)
NEUTROPHILS # BLD AUTO: 3.53 THOUSANDS/ΜL (ref 1.85–7.62)
NEUTS SEG NFR BLD AUTO: 71 % (ref 43–75)
NITRITE UR QL STRIP: NEGATIVE
NRBC BLD AUTO-RTO: 0 /100 WBCS
PH UR STRIP.AUTO: 7 [PH] (ref 4.5–8)
PLATELET # BLD AUTO: 146 THOUSANDS/UL (ref 149–390)
PMV BLD AUTO: 9.7 FL (ref 8.9–12.7)
POTASSIUM SERPL-SCNC: 3.3 MMOL/L (ref 3.5–5.3)
PROT SERPL-MCNC: 7.6 G/DL (ref 6.4–8.2)
PROT UR STRIP-MCNC: NEGATIVE MG/DL
PROTHROMBIN TIME: 14.1 SECONDS (ref 11.8–14.2)
RBC # BLD AUTO: 5.73 MILLION/UL (ref 3.88–5.62)
SALICYLATES SERPL-MCNC: 4 MG/DL (ref 3–20)
SODIUM SERPL-SCNC: 138 MMOL/L (ref 136–145)
SP GR UR STRIP.AUTO: 1.02 (ref 1–1.03)
UROBILINOGEN UR QL STRIP.AUTO: 1 E.U./DL
WBC # BLD AUTO: 4.98 THOUSAND/UL (ref 4.31–10.16)

## 2019-04-19 PROCEDURE — 85025 COMPLETE CBC W/AUTO DIFF WBC: CPT | Performed by: EMERGENCY MEDICINE

## 2019-04-19 PROCEDURE — 99284 EMERGENCY DEPT VISIT MOD MDM: CPT

## 2019-04-19 PROCEDURE — 96374 THER/PROPH/DIAG INJ IV PUSH: CPT

## 2019-04-19 PROCEDURE — 81003 URINALYSIS AUTO W/O SCOPE: CPT

## 2019-04-19 PROCEDURE — 83690 ASSAY OF LIPASE: CPT | Performed by: EMERGENCY MEDICINE

## 2019-04-19 PROCEDURE — 99283 EMERGENCY DEPT VISIT LOW MDM: CPT | Performed by: EMERGENCY MEDICINE

## 2019-04-19 PROCEDURE — 85730 THROMBOPLASTIN TIME PARTIAL: CPT | Performed by: EMERGENCY MEDICINE

## 2019-04-19 PROCEDURE — 96361 HYDRATE IV INFUSION ADD-ON: CPT

## 2019-04-19 PROCEDURE — 96375 TX/PRO/DX INJ NEW DRUG ADDON: CPT

## 2019-04-19 PROCEDURE — 36415 COLL VENOUS BLD VENIPUNCTURE: CPT | Performed by: EMERGENCY MEDICINE

## 2019-04-19 PROCEDURE — 85610 PROTHROMBIN TIME: CPT | Performed by: EMERGENCY MEDICINE

## 2019-04-19 PROCEDURE — 83735 ASSAY OF MAGNESIUM: CPT | Performed by: EMERGENCY MEDICINE

## 2019-04-19 PROCEDURE — 80053 COMPREHEN METABOLIC PANEL: CPT | Performed by: EMERGENCY MEDICINE

## 2019-04-19 PROCEDURE — 80320 DRUG SCREEN QUANTALCOHOLS: CPT | Performed by: EMERGENCY MEDICINE

## 2019-04-19 PROCEDURE — C9113 INJ PANTOPRAZOLE SODIUM, VIA: HCPCS | Performed by: EMERGENCY MEDICINE

## 2019-04-19 PROCEDURE — 80329 ANALGESICS NON-OPIOID 1 OR 2: CPT | Performed by: EMERGENCY MEDICINE

## 2019-04-19 RX ORDER — KETOROLAC TROMETHAMINE 30 MG/ML
30 INJECTION, SOLUTION INTRAMUSCULAR; INTRAVENOUS ONCE
Status: COMPLETED | OUTPATIENT
Start: 2019-04-19 | End: 2019-04-19

## 2019-04-19 RX ORDER — DIPHENHYDRAMINE HYDROCHLORIDE 50 MG/ML
25 INJECTION INTRAMUSCULAR; INTRAVENOUS ONCE
Status: DISCONTINUED | OUTPATIENT
Start: 2019-04-19 | End: 2019-04-19

## 2019-04-19 RX ORDER — METOCLOPRAMIDE HYDROCHLORIDE 5 MG/ML
10 INJECTION INTRAMUSCULAR; INTRAVENOUS ONCE
Status: COMPLETED | OUTPATIENT
Start: 2019-04-19 | End: 2019-04-19

## 2019-04-19 RX ORDER — PANTOPRAZOLE SODIUM 40 MG/1
40 INJECTION, POWDER, FOR SOLUTION INTRAVENOUS ONCE
Status: COMPLETED | OUTPATIENT
Start: 2019-04-19 | End: 2019-04-19

## 2019-04-19 RX ADMIN — FAMOTIDINE 20 MG: 10 INJECTION, SOLUTION INTRAVENOUS at 05:04

## 2019-04-19 RX ADMIN — METOCLOPRAMIDE 10 MG: 5 INJECTION, SOLUTION INTRAMUSCULAR; INTRAVENOUS at 05:10

## 2019-04-19 RX ADMIN — PANTOPRAZOLE SODIUM 40 MG: 40 INJECTION, POWDER, FOR SOLUTION INTRAVENOUS at 05:07

## 2019-04-19 RX ADMIN — SODIUM CHLORIDE 1000 ML: 0.9 INJECTION, SOLUTION INTRAVENOUS at 05:03

## 2019-04-19 RX ADMIN — KETOROLAC TROMETHAMINE 30 MG: 30 INJECTION, SOLUTION INTRAMUSCULAR; INTRAVENOUS at 05:04

## 2019-07-24 ENCOUNTER — HOSPITAL ENCOUNTER (EMERGENCY)
Facility: HOSPITAL | Age: 39
Discharge: HOME | End: 2019-07-24
Attending: EMERGENCY MEDICINE
Payer: MEDICARE

## 2019-07-24 ENCOUNTER — APPOINTMENT (EMERGENCY)
Dept: CT IMAGING | Facility: HOSPITAL | Age: 39
End: 2019-07-24
Payer: MEDICARE

## 2019-07-24 VITALS
BODY MASS INDEX: 33.44 KG/M2 | OXYGEN SATURATION: 97 % | DIASTOLIC BLOOD PRESSURE: 88 MMHG | SYSTOLIC BLOOD PRESSURE: 154 MMHG | WEIGHT: 233.03 LBS | HEART RATE: 70 BPM | RESPIRATION RATE: 16 BRPM | TEMPERATURE: 99 F

## 2019-07-24 DIAGNOSIS — R10.9 ABDOMINAL PAIN: Primary | ICD-10-CM

## 2019-07-24 DIAGNOSIS — K86.1 CHRONIC PANCREATITIS (HCC): ICD-10-CM

## 2019-07-24 LAB
ALBUMIN SERPL BCP-MCNC: 3.6 G/DL (ref 3.5–5)
ALP SERPL-CCNC: 78 U/L (ref 46–116)
ALT SERPL W P-5'-P-CCNC: 26 U/L (ref 12–78)
ANION GAP SERPL CALCULATED.3IONS-SCNC: 8 MMOL/L (ref 4–13)
AST SERPL W P-5'-P-CCNC: 30 U/L (ref 5–45)
BASOPHILS # BLD AUTO: 0.02 THOUSANDS/ΜL (ref 0–0.1)
BASOPHILS NFR BLD AUTO: 1 % (ref 0–1)
BILIRUB SERPL-MCNC: 0.51 MG/DL (ref 0.2–1)
BILIRUB UR QL STRIP: NEGATIVE
BUN SERPL-MCNC: 11 MG/DL (ref 5–25)
CALCIUM SERPL-MCNC: 8.3 MG/DL (ref 8.3–10.1)
CHLORIDE SERPL-SCNC: 101 MMOL/L (ref 100–108)
CLARITY UR: CLEAR
CO2 SERPL-SCNC: 28 MMOL/L (ref 21–32)
COLOR UR: YELLOW
COLOR, POC: NORMAL
CREAT SERPL-MCNC: 0.77 MG/DL (ref 0.6–1.3)
EOSINOPHIL # BLD AUTO: 0.05 THOUSAND/ΜL (ref 0–0.61)
EOSINOPHIL NFR BLD AUTO: 1 % (ref 0–6)
ERYTHROCYTE [DISTWIDTH] IN BLOOD BY AUTOMATED COUNT: 14 % (ref 11.6–15.1)
GFR SERPL CREATININE-BSD FRML MDRD: 113 ML/MIN/1.73SQ M
GLUCOSE SERPL-MCNC: 221 MG/DL (ref 65–140)
GLUCOSE UR STRIP-MCNC: NEGATIVE MG/DL
HCT VFR BLD AUTO: 42.8 % (ref 36.5–49.3)
HGB BLD-MCNC: 14 G/DL (ref 12–17)
HGB UR QL STRIP.AUTO: NEGATIVE
IMM GRANULOCYTES # BLD AUTO: 0.01 THOUSAND/UL (ref 0–0.2)
IMM GRANULOCYTES NFR BLD AUTO: 0 % (ref 0–2)
KETONES UR STRIP-MCNC: NEGATIVE MG/DL
LEUKOCYTE ESTERASE UR QL STRIP: NEGATIVE
LIPASE SERPL-CCNC: 579 U/L (ref 73–393)
LYMPHOCYTES # BLD AUTO: 0.71 THOUSANDS/ΜL (ref 0.6–4.47)
LYMPHOCYTES NFR BLD AUTO: 18 % (ref 14–44)
MCH RBC QN AUTO: 27.6 PG (ref 26.8–34.3)
MCHC RBC AUTO-ENTMCNC: 32.7 G/DL (ref 31.4–37.4)
MCV RBC AUTO: 84 FL (ref 82–98)
MONOCYTES # BLD AUTO: 0.22 THOUSAND/ΜL (ref 0.17–1.22)
MONOCYTES NFR BLD AUTO: 6 % (ref 4–12)
NEUTROPHILS # BLD AUTO: 2.91 THOUSANDS/ΜL (ref 1.85–7.62)
NEUTS SEG NFR BLD AUTO: 74 % (ref 43–75)
NITRITE UR QL STRIP: NEGATIVE
NRBC BLD AUTO-RTO: 0 /100 WBCS
PH UR STRIP.AUTO: 5 [PH] (ref 4.5–8)
PLATELET # BLD AUTO: 108 THOUSANDS/UL (ref 149–390)
PMV BLD AUTO: 9.6 FL (ref 8.9–12.7)
POTASSIUM SERPL-SCNC: 4.2 MMOL/L (ref 3.5–5.3)
PROT SERPL-MCNC: 7 G/DL (ref 6.4–8.2)
PROT UR STRIP-MCNC: NEGATIVE MG/DL
RBC # BLD AUTO: 5.07 MILLION/UL (ref 3.88–5.62)
SODIUM SERPL-SCNC: 137 MMOL/L (ref 136–145)
SP GR UR STRIP.AUTO: 1.01 (ref 1–1.03)
UROBILINOGEN UR QL STRIP.AUTO: 0.2 E.U./DL
WBC # BLD AUTO: 3.92 THOUSAND/UL (ref 4.31–10.16)

## 2019-07-24 PROCEDURE — 36415 COLL VENOUS BLD VENIPUNCTURE: CPT | Performed by: EMERGENCY MEDICINE

## 2019-07-24 PROCEDURE — 83690 ASSAY OF LIPASE: CPT | Performed by: EMERGENCY MEDICINE

## 2019-07-24 PROCEDURE — 99284 EMERGENCY DEPT VISIT MOD MDM: CPT | Performed by: EMERGENCY MEDICINE

## 2019-07-24 PROCEDURE — 80053 COMPREHEN METABOLIC PANEL: CPT | Performed by: EMERGENCY MEDICINE

## 2019-07-24 PROCEDURE — 81003 URINALYSIS AUTO W/O SCOPE: CPT

## 2019-07-24 PROCEDURE — 99284 EMERGENCY DEPT VISIT MOD MDM: CPT

## 2019-07-24 PROCEDURE — 96374 THER/PROPH/DIAG INJ IV PUSH: CPT

## 2019-07-24 PROCEDURE — 85025 COMPLETE CBC W/AUTO DIFF WBC: CPT | Performed by: EMERGENCY MEDICINE

## 2019-07-24 PROCEDURE — 74177 CT ABD & PELVIS W/CONTRAST: CPT

## 2019-07-24 RX ORDER — ACETAMINOPHEN 325 MG/1
975 TABLET ORAL ONCE
Status: COMPLETED | OUTPATIENT
Start: 2019-07-24 | End: 2019-07-24

## 2019-07-24 RX ORDER — KETOROLAC TROMETHAMINE 30 MG/ML
15 INJECTION, SOLUTION INTRAMUSCULAR; INTRAVENOUS ONCE
Status: COMPLETED | OUTPATIENT
Start: 2019-07-24 | End: 2019-07-24

## 2019-07-24 RX ORDER — DICYCLOMINE HCL 20 MG
20 TABLET ORAL 2 TIMES DAILY
Qty: 20 TABLET | Refills: 0 | Status: SHIPPED | OUTPATIENT
Start: 2019-07-24

## 2019-07-24 RX ORDER — DICYCLOMINE HCL 20 MG
20 TABLET ORAL ONCE
Status: COMPLETED | OUTPATIENT
Start: 2019-07-24 | End: 2019-07-24

## 2019-07-24 RX ADMIN — IOHEXOL 100 ML: 350 INJECTION, SOLUTION INTRAVENOUS at 15:45

## 2019-07-24 RX ADMIN — ACETAMINOPHEN 975 MG: 325 TABLET ORAL at 13:25

## 2019-07-24 RX ADMIN — DICYCLOMINE HYDROCHLORIDE 20 MG: 20 TABLET ORAL at 16:06

## 2019-07-24 RX ADMIN — LIDOCAINE HYDROCHLORIDE 159 MG: 10 INJECTION, SOLUTION EPIDURAL; INFILTRATION; INTRACAUDAL; PERINEURAL at 13:40

## 2019-07-24 RX ADMIN — KETOROLAC TROMETHAMINE 15 MG: 30 INJECTION, SOLUTION INTRAMUSCULAR at 13:36

## 2019-07-24 NOTE — ED ATTENDING ATTESTATION
Julianna Layton MD, saw and evaluated the patient  I have discussed the patient with the resident/non-physician practitioner and agree with the resident's/non-physician practitioner's findings, Plan of Care, and MDM as documented in the resident's/non-physician practitioner's note, except where noted  All available labs and Radiology studies were reviewed  I was present for key portions of any procedure(s) performed by the resident/non-physician practitioner and I was immediately available to provide assistance  At this point I agree with the current assessment done in the Emergency Department  I have conducted an independent evaluation of this patient a history and physical is as follows:     64-year-old male presents for evaluation abdominal pain  Patient complains of upper abdominal soreness which feels similar to his prior pancreatitis which has been present for several days  He states the pain that is concerning him today is a sharp crampy severe lower abdominal pain which radiates across his lower abdomen  Is without modifying factors  He tried his medical marijuana yesterday with minimal relief in symptoms  He denies nausea, vomiting, diarrhea, constipation, sticking her complaints, urinary complaints, fevers, anorexia  Ten systems reviewed otherwise negative  On exam no distress, lungs normal cardiac normal, abdomen soft, diffusely tender palpation over the lower abdomen without rebound or guarding    Medical decision making;-acute abd pain will check abd labs, urine dip, ct a/p to r/o acute pathology, prn pain meds, reassess  Critical Care Time  Procedures

## 2019-07-24 NOTE — ED PROVIDER NOTES
History  Chief Complaint   Patient presents with    Abdominal Pain     c/o abdominal pain which began yesterday  denies n/v/d     37year old male with hx of DM, chronic pancreatitis, asthma, presents to ED for abdominal pain  Patient says this is different than his typical pancreatitis pain  Pain started yesterday afternoon, constant since onset, achy in quality, 10/10 severity, localized to bilateral lower quadrants radiating towards midline  Denies fever, chills, nausea, vomiting, diarrhea, constipation, urinary symptoms, or any other complaints  Abdominal surgeries include pancreatic surgery for necrotizing pancreatitis  Prior to Admission Medications   Prescriptions Last Dose Informant Patient Reported? Taking? NON FORMULARY   Yes Yes   Sig: Medical marijuana- vaporized prn   methocarbamol (ROBAXIN) 500 mg tablet   No No   Sig: Take 2 tablets (1,000 mg total) by mouth every 8 (eight) hours as needed for muscle spasms for up to 7 days      Facility-Administered Medications: None       Past Medical History:   Diagnosis Date    Asthma     [facility] pancreat[facility] ([facility] )     Diabetes mellitus ([facility] )     Hypertension        Past Surgical History:   Procedure Laterality Date    ABDOMINAL SURGERY      FRACTURE SURGERY         History reviewed  No pertinent family history  I have reviewed and agree with the history as documented  Social History     Tobacco Use    Smoking status: Current Every Day Smoker     Packs/day: 1 00     Types: Cigarettes    Smokeless tobacco: Never Used   Substance Use Topics    Alcohol use: No    Drug use: Yes     Types: Marijuana     Comment: daily         Review of Systems   Constitutional: Negative  Negative for chills and fever  HENT: Negative  Negative for congestion and rhinorrhea  Eyes: Negative  Respiratory: Negative  Negative for cough and shortness of breath  Cardiovascular: Negative  Negative for chest pain and leg swelling     Gastrointestinal: Positive for abdominal pain  Negative for abdominal distention, diarrhea, nausea and vomiting  Musculoskeletal: Negative  Negative for back pain and neck pain  Skin: Negative  Negative for rash  Neurological: Negative  Negative for light-headedness and headaches  Hematological: Negative  All other systems reviewed and are negative  Physical Exam  ED Triage Vitals [07/24/19 1233]   Temperature Pulse Respirations Blood Pressure SpO2   99 °F (37 2 °C) 85 18 168/99 96 %      Temp Source Heart Rate Source Patient Position - Orthostatic VS BP Location FiO2 (%)   Temporal Monitor Sitting Right arm --      Pain Score       Worst Possible Pain             Orthostatic Vital Signs  Vitals:    07/24/19 1339 07/24/19 1354 07/24/19 1410 07/24/19 1611   BP: 146/87 148/85 145/90 154/88   Pulse: 75 76 74 70   Patient Position - Orthostatic VS: Lying Lying Lying Sitting       Physical Exam   Constitutional: He is oriented to person, place, and time  He appears well-developed and well-nourished  No distress  HENT:   Head: Normocephalic and atraumatic  Mouth/Throat: Oropharynx is clear and moist    Eyes: EOM are normal    Neck: Normal range of motion  Neck supple  Cardiovascular: Normal rate, regular rhythm, normal heart sounds and intact distal pulses  Exam reveals no gallop and no friction rub  No murmur heard  Pulmonary/Chest: Effort normal and breath sounds normal  No respiratory distress  He has no wheezes  He has no rales  Abdominal: Soft  Bowel sounds are normal  He exhibits no distension  There is tenderness (mild to bilateral lower quadrants)  There is no rigidity, no rebound, no guarding and no CVA tenderness  Musculoskeletal: He exhibits no edema or tenderness  Neurological: He is alert and oriented to person, place, and time  Clear fluent speech   Skin: Skin is warm and dry  Capillary refill takes less than 2 seconds  Psychiatric: He has a normal mood and affect     Nursing note and vitals reviewed        ED Medications  Medications   ketorolac (TORADOL) injection 15 mg (15 mg Intravenous Given 7/24/19 1336)   acetaminophen (TYLENOL) tablet 975 mg (975 mg Oral Given 7/24/19 1325)   lidocaine (XYLOCAINE) 1 % 159 mg in dextrose 5 % 50 mL IVPB (0 mg/kg × 106 kg Intravenous Stopped 7/24/19 1410)   iohexol (OMNIPAQUE) 350 MG/ML injection (MULTI-DOSE) 100 mL (100 mL Intravenous Given 7/24/19 1545)   dicyclomine (BENTYL) tablet 20 mg (20 mg Oral Given 7/24/19 1606)       Diagnostic Studies  Results Reviewed     Procedure Component Value Units Date/Time    POCT urinalysis dipstick [[id number]]  (Normal) Resulted:  07/24/19 1622    Lab Status:  Final result Updated:  07/24/19 1629     Color, UA Yellow,Clear    ED Urine Macroscopic [[id number]] Collected:  07/24/19 1634    Lab Status:  Final result Specimen:  Urine Updated:  07/24/19 1622     Color, UA Yellow     Clarity, UA Clear     pH, UA 5 0     Leukocytes, UA Negative     Nitrite, UA Negative     Protein, UA Negative mg/dl      Glucose, UA Negative mg/dl      Ketones, UA Negative mg/dl      Urobilinogen, UA 0 2 E U /dl      Bilirubin, UA Negative     Blood, UA Negative     Specific Gravity, UA 1 015    Narrative:       CLINITEK RESULT    CMP [[id number]]  (Abnormal) Collected:  07/24/19 1335    Lab Status:  Final result Specimen:  Blood from Arm, Right Updated:  07/24/19 1511     Sodium 137 mmol/L      Potassium 4 2 mmol/L      Chloride 101 mmol/L      CO2 28 mmol/L      ANION GAP 8 mmol/L      BUN 11 mg/dL      Creatinine 0 77 mg/dL      Glucose 221 mg/dL      Calcium 8 3 mg/dL      AST 30 U/L      ALT 26 U/L      Alkaline Phosphatase 78 U/L      Total Protein 7 0 g/dL      Albumin 3 6 g/dL      Total Bilirubin 0 51 mg/dL      eGFR 113 ml/min/1 73sq m     Narrative:       [facility] guidelines for Chronic Kidney Disease (CKD):     Stage 1 with normal or high GFR (GFR > 90 mL/min/1 73 square meters)    Stage 2 Mild CKD (GFR = 60-89 mL/min/1 73 square meters)    Stage 3A Moderate CKD (GFR = 45-59 mL/min/1 73 square meters)    Stage 3B Moderate CKD (GFR = 30-44 mL/min/1 73 square meters)    Stage 4 Severe CKD (GFR = 15-29 mL/min/1 73 square meters)    Stage 5 End Stage CKD (GFR <15 mL/min/1 73 square meters)  Note: GFR calculation is accurate only with a steady state creatinine    Lipase [[id number]]  (Abnormal) Collected:  07/24/19 1335    Lab Status:  Final result Specimen:  Blood from Arm, Right Updated:  07/24/19 1511     Lipase 579 u/L     CBC and differential [[id number]]  (Abnormal) Collected:  07/24/19 1335    Lab Status:  Final result Specimen:  Blood from Arm, Right Updated:  07/24/19 1348     WBC 3 92 Thousand/uL      RBC 5 07 Million/uL      Hemoglobin 14 0 g/dL      Hematocrit 42 8 %      MCV 84 fL      MCH 27 6 pg      MCHC 32 7 g/dL      RDW 14 0 %      MPV 9 6 fL      Platelets 861 Thousands/uL      nRBC 0 /100 WBCs      Neutrophils Relative 74 %      Immat GRANS % 0 %      Lymphocytes Relative 18 %      Monocytes Relative 6 %      Eosinophils Relative 1 %      Basophils Relative 1 %      Neutrophils Absolute 2 91 Thousands/µL      Immature Grans Absolute 0 01 Thousand/uL      Lymphocytes Absolute 0 71 Thousands/µL      Monocytes Absolute 0 22 Thousand/µL      Eosinophils Absolute 0 05 Thousand/µL      Basophils Absolute 0 02 Thousands/µL                  CT abdomen pelvis with contrast   Final Result by Isa Callahan MD (07/24 1615)      No significant interval change since prior examination  Redemonstration of multiple coarse and dystrophic calcifications, predominantly centered along the distal body and tail of the pancreas compatible with patient's history of chronic pancreatitis  Correlate with amylase lipase levels to exclude the    possibility of acute on chronic pancreatitis  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 with submucosal gastric varices noted          Grossly stable splenomegaly with trace perisplenic fluid  Hypodensity within the spleen is similar to the prior examination  Normal caliber appendix  Remainder of the findings, as described above  Workstation performed: [id number]               Procedures  Procedures        ED Course  ED Course as of Jul 24 2026 Wed Jul 24, 2019   1640 BUN: 11                               MDM  Number of Diagnoses or Management Options  Abdominal pain:   Diagnosis management comments: 37year old male with hx of DM, chronic pancreatitis, asthma, presents to ED for abdominal pain  Patient feels improved on re-exam  Workup shows no acute findings  Strict ED return precautions provided should symptoms worsen and patient can otherwise follow up outpatient  Patient expresses an understanding and agreement with the plan and remains in good condition for discharge  Disposition  Final diagnoses:   Abdominal pain   Chronic pancreatitis ([facility] [address] [facility] )     Time reflects when diagnosis was documented in both MDM as applicable and the Disposition within this note     Time User Action Codes Description Comment    7/24/2019  1:50 PM Sydney Gamino Add [R10 9] Abdominal pain     7/24/2019  8:26 PM Sydney Gamino Add [K86 1] Chronic pancreatitis [facility])       ED Disposition     ED Disposition Condition Date/Time Comment    Discharge Good Wed Jul 24, 2019  4:40 PM Oleg Bobby discharge to home/self care              Follow-up Information     Follow up With Specialties Details Why Contact Info Additional Information    Ralf Salazar MD Family Medicine Call in 1 day To make an appointment [address] Emergency Department Emergency Medicine Go to  If symptoms worsen [facility] [phone]  [place] [address] ED, [address] [place] [address], [phone]          Discharge Medication List as of 7/24/2019  4:40 PM      CONTINUE these medications which have NOT CHANGED    Details   NON FORMULARY Medical marijuana- vaporized prn, Historical Med      methocarbamol (ROBAXIN) 500 mg tablet Take 2 tablets (1,000 mg total) by mouth every 8 (eight) hours as needed for muscle spasms for up to 7 days, Starting Thu 10/11/2018, Until Fri 11/9/2018, Print           No discharge procedures on file  ED Provider  Attending physically available and evaluated Amandeep Greenwood I managed the patient along with the ED Attending      Electronically Signed by         Angelic Arana MD  07/24/19 2026

## 2020-01-04 ENCOUNTER — HOSPITAL ENCOUNTER (EMERGENCY)
Facility: HOSPITAL | Age: 40
Discharge: HOME | End: 2020-01-05
Attending: EMERGENCY MEDICINE | Admitting: EMERGENCY MEDICINE
Payer: MEDICARE

## 2020-01-04 DIAGNOSIS — R11.2 NAUSEA VOMITING AND DIARRHEA: ICD-10-CM

## 2020-01-04 DIAGNOSIS — K86.1 CHRONIC PANCREATITIS (HCC): ICD-10-CM

## 2020-01-04 DIAGNOSIS — K52.9 GASTROENTERITIS: Primary | ICD-10-CM

## 2020-01-04 DIAGNOSIS — R19.7 NAUSEA VOMITING AND DIARRHEA: ICD-10-CM

## 2020-01-04 DIAGNOSIS — I10 HYPERTENSION: ICD-10-CM

## 2020-01-04 DIAGNOSIS — E11.65 DIABETES MELLITUS WITH HYPERGLYCEMIA (HCC): ICD-10-CM

## 2020-01-04 LAB
BILIRUB UR QL STRIP: NEGATIVE
CLARITY UR: CLEAR
CLARITY, POC: CLEAR
COLOR UR: YELLOW
COLOR, POC: YELLOW
GLUCOSE UR STRIP-MCNC: ABNORMAL MG/DL
HGB UR QL STRIP.AUTO: NEGATIVE
KETONES UR STRIP-MCNC: NEGATIVE MG/DL
LEUKOCYTE ESTERASE UR QL STRIP: NEGATIVE
NITRITE UR QL STRIP: NEGATIVE
PH UR STRIP.AUTO: 7 [PH] (ref 4.5–8)
PROT UR STRIP-MCNC: NEGATIVE MG/DL
SP GR UR STRIP.AUTO: 1.02 (ref 1–1.03)
UROBILINOGEN UR QL STRIP.AUTO: 0.2 E.U./DL

## 2020-01-04 PROCEDURE — 83690 ASSAY OF LIPASE: CPT | Performed by: NURSE PRACTITIONER

## 2020-01-04 PROCEDURE — 99285 EMERGENCY DEPT VISIT HI MDM: CPT | Performed by: EMERGENCY MEDICINE

## 2020-01-04 PROCEDURE — 36415 COLL VENOUS BLD VENIPUNCTURE: CPT | Performed by: NURSE PRACTITIONER

## 2020-01-04 PROCEDURE — 99284 EMERGENCY DEPT VISIT MOD MDM: CPT

## 2020-01-04 PROCEDURE — 85025 COMPLETE CBC W/AUTO DIFF WBC: CPT | Performed by: NURSE PRACTITIONER

## 2020-01-04 PROCEDURE — 83735 ASSAY OF MAGNESIUM: CPT | Performed by: NURSE PRACTITIONER

## 2020-01-04 PROCEDURE — 80053 COMPREHEN METABOLIC PANEL: CPT | Performed by: NURSE PRACTITIONER

## 2020-01-04 PROCEDURE — 81003 URINALYSIS AUTO W/O SCOPE: CPT

## 2020-01-04 RX ORDER — OMEPRAZOLE 40 MG/1
40 CAPSULE, DELAYED RELEASE ORAL DAILY
COMMUNITY
Start: 2019-07-25

## 2020-01-04 RX ORDER — EPINEPHRINE 0.3 MG/.3ML
0.3 INJECTION SUBCUTANEOUS
COMMUNITY
Start: 2019-05-31

## 2020-01-04 RX ORDER — OXYCODONE HYDROCHLORIDE 5 MG/1
5 CAPSULE ORAL EVERY 4 HOURS PRN
COMMUNITY

## 2020-01-04 RX ORDER — ALBUTEROL SULFATE 90 UG/1
2 AEROSOL, METERED RESPIRATORY (INHALATION) EVERY 4 HOURS PRN
COMMUNITY
Start: 2019-07-25

## 2020-01-04 RX ORDER — HYDROMORPHONE HCL/PF 1 MG/ML
1 SYRINGE (ML) INJECTION ONCE
Status: COMPLETED | OUTPATIENT
Start: 2020-01-05 | End: 2020-01-05

## 2020-01-04 RX ORDER — ONDANSETRON 2 MG/ML
4 INJECTION INTRAMUSCULAR; INTRAVENOUS ONCE
Status: COMPLETED | OUTPATIENT
Start: 2020-01-05 | End: 2020-01-05

## 2020-01-04 RX ORDER — CHLORTHALIDONE 25 MG/1
50 TABLET ORAL DAILY
COMMUNITY
Start: 2019-07-25

## 2020-01-04 RX ORDER — ONDANSETRON 4 MG/1
4 TABLET, ORALLY DISINTEGRATING ORAL EVERY 8 HOURS PRN
COMMUNITY
Start: 2019-10-12

## 2020-01-04 RX ORDER — CARVEDILOL 12.5 MG/1
25 TABLET ORAL
COMMUNITY
Start: 2019-09-19 | End: 2020-09-18

## 2020-01-04 RX ORDER — ALBUTEROL SULFATE 2.5 MG/3ML
2.5 SOLUTION RESPIRATORY (INHALATION) EVERY 4 HOURS PRN
COMMUNITY
Start: 2019-07-25

## 2020-01-04 RX ORDER — AMLODIPINE BESYLATE 10 MG/1
10 TABLET ORAL DAILY
COMMUNITY
Start: 2019-07-25

## 2020-01-05 ENCOUNTER — APPOINTMENT (EMERGENCY)
Dept: CT IMAGING | Facility: HOSPITAL | Age: 40
End: 2020-01-05
Payer: MEDICARE

## 2020-01-05 VITALS
TEMPERATURE: 97.7 F | BODY MASS INDEX: 32.2 KG/M2 | RESPIRATION RATE: 18 BRPM | HEART RATE: 61 BPM | WEIGHT: 224.43 LBS | SYSTOLIC BLOOD PRESSURE: 91 MMHG | OXYGEN SATURATION: 93 % | DIASTOLIC BLOOD PRESSURE: 54 MMHG

## 2020-01-05 LAB
ALBUMIN SERPL BCP-MCNC: 4 G/DL (ref 3.5–5)
ALP SERPL-CCNC: 87 U/L (ref 46–116)
ALT SERPL W P-5'-P-CCNC: 27 U/L (ref 12–78)
ANION GAP SERPL CALCULATED.3IONS-SCNC: 8 MMOL/L (ref 4–13)
AST SERPL W P-5'-P-CCNC: 23 U/L (ref 5–45)
BASOPHILS # BLD AUTO: 0.03 THOUSANDS/ΜL (ref 0–0.1)
BASOPHILS NFR BLD AUTO: 0 % (ref 0–1)
BILIRUB SERPL-MCNC: 0.45 MG/DL (ref 0.2–1)
BUN SERPL-MCNC: 16 MG/DL (ref 5–25)
CALCIUM SERPL-MCNC: 8.5 MG/DL (ref 8.3–10.1)
CHLORIDE SERPL-SCNC: 95 MMOL/L (ref 100–108)
CO2 SERPL-SCNC: 29 MMOL/L (ref 21–32)
CREAT SERPL-MCNC: 0.93 MG/DL (ref 0.6–1.3)
EOSINOPHIL # BLD AUTO: 0.08 THOUSAND/ΜL (ref 0–0.61)
EOSINOPHIL NFR BLD AUTO: 1 % (ref 0–6)
ERYTHROCYTE [DISTWIDTH] IN BLOOD BY AUTOMATED COUNT: 12.7 % (ref 11.6–15.1)
GFR SERPL CREATININE-BSD FRML MDRD: 102 ML/MIN/1.73SQ M
GLUCOSE SERPL-MCNC: 279 MG/DL (ref 65–140)
GLUCOSE SERPL-MCNC: 329 MG/DL (ref 65–140)
HCT VFR BLD AUTO: 42.5 % (ref 36.5–49.3)
HGB BLD-MCNC: 14.1 G/DL (ref 12–17)
IMM GRANULOCYTES # BLD AUTO: 0.04 THOUSAND/UL (ref 0–0.2)
IMM GRANULOCYTES NFR BLD AUTO: 1 % (ref 0–2)
LIPASE SERPL-CCNC: 621 U/L (ref 73–393)
LYMPHOCYTES # BLD AUTO: 1.13 THOUSANDS/ΜL (ref 0.6–4.47)
LYMPHOCYTES NFR BLD AUTO: 17 % (ref 14–44)
MAGNESIUM SERPL-MCNC: 1.6 MG/DL (ref 1.6–2.6)
MCH RBC QN AUTO: 27.1 PG (ref 26.8–34.3)
MCHC RBC AUTO-ENTMCNC: 33.2 G/DL (ref 31.4–37.4)
MCV RBC AUTO: 82 FL (ref 82–98)
MONOCYTES # BLD AUTO: 0.43 THOUSAND/ΜL (ref 0.17–1.22)
MONOCYTES NFR BLD AUTO: 6 % (ref 4–12)
NEUTROPHILS # BLD AUTO: 4.98 THOUSANDS/ΜL (ref 1.85–7.62)
NEUTS SEG NFR BLD AUTO: 75 % (ref 43–75)
NRBC BLD AUTO-RTO: 0 /100 WBCS
PLATELET # BLD AUTO: 156 THOUSANDS/UL (ref 149–390)
PMV BLD AUTO: 9.7 FL (ref 8.9–12.7)
POTASSIUM SERPL-SCNC: 3.8 MMOL/L (ref 3.5–5.3)
PROT SERPL-MCNC: 7.7 G/DL (ref 6.4–8.2)
RBC # BLD AUTO: 5.21 MILLION/UL (ref 3.88–5.62)
SODIUM SERPL-SCNC: 132 MMOL/L (ref 136–145)
WBC # BLD AUTO: 6.69 THOUSAND/UL (ref 4.31–10.16)

## 2020-01-05 PROCEDURE — 96375 TX/PRO/DX INJ NEW DRUG ADDON: CPT

## 2020-01-05 PROCEDURE — 96361 HYDRATE IV INFUSION ADD-ON: CPT

## 2020-01-05 PROCEDURE — 74177 CT ABD & PELVIS W/CONTRAST: CPT

## 2020-01-05 PROCEDURE — 82948 REAGENT STRIP/BLOOD GLUCOSE: CPT

## 2020-01-05 PROCEDURE — 96374 THER/PROPH/DIAG INJ IV PUSH: CPT

## 2020-01-05 RX ORDER — KETOROLAC TROMETHAMINE 30 MG/ML
15 INJECTION, SOLUTION INTRAMUSCULAR; INTRAVENOUS ONCE
Status: COMPLETED | OUTPATIENT
Start: 2020-01-05 | End: 2020-01-05

## 2020-01-05 RX ADMIN — KETOROLAC TROMETHAMINE 15 MG: 30 INJECTION, SOLUTION INTRAMUSCULAR at 01:59

## 2020-01-05 RX ADMIN — IOHEXOL 100 ML: 350 INJECTION, SOLUTION INTRAVENOUS at 01:34

## 2020-01-05 RX ADMIN — ONDANSETRON 4 MG: 2 INJECTION INTRAMUSCULAR; INTRAVENOUS at 00:00

## 2020-01-05 RX ADMIN — HYDROMORPHONE HYDROCHLORIDE 1 MG: 1 INJECTION, SOLUTION INTRAMUSCULAR; INTRAVENOUS; SUBCUTANEOUS at 00:06

## 2020-01-05 RX ADMIN — SODIUM CHLORIDE 1000 ML: 0.9 INJECTION, SOLUTION INTRAVENOUS at 00:03

## 2020-01-05 NOTE — ED PROVIDER NOTES
History  Chief Complaint   Patient presents with    Abdominal Pain     generalized abdominal pain for 4 days  Pt reports the pain goes around to his back  Pt reports nausea  Pt states " I took 80 mg of Oxy and it hasn't helped"  This is a 37year old male who states has a hx of chronic pancreatitis and began with abdominal pain 4 days ago which has progressively gotten worse  He states he is dry heaving  He states he took 80 mg Oxy an it hasn't helped with the pain  Denies diarrhea, fevers  Pt states he drove here but can get a ride  Pt states he takes metformin and does not check his blood sugars  History provided by:  Medical records and patient   used: No        Prior to Admission Medications   Prescriptions Last Dose Informant Patient Reported? Taking?    EPINEPHrine (EPIPEN) 0 3 mg/0 3 mL SOAJ   Yes Yes   Sig: Inject 0 3 mg into a muscle   NON FORMULARY   Yes Yes   Sig: Medical marijuana- vaporized prn   Pancrelipase, Lip-Prot-Amyl, (CREON PO)   Yes Yes   Sig: Take by mouth   albuterol (2 5 mg/3 mL) 0 083 % nebulizer solution   Yes Yes   Sig: Inhale 2 5 mg every 4 (four) hours as needed   albuterol (PROVENTIL HFA,VENTOLIN HFA) 90 mcg/act inhaler   Yes Yes   Sig: Inhale 2 puffs every 4 (four) hours as needed   amLODIPine (NORVASC) 10 mg tablet   Yes Yes   Sig: Take 10 mg by mouth daily   carvedilol (COREG) 12 5 mg tablet   Yes Yes   Sig: Take 25 mg by mouth   chlorthalidone 25 mg tablet   Yes Yes   Sig: Take 50 mg by mouth daily   dicyclomine (BENTYL) 20 mg tablet Not Taking at Unknown time  No No   Sig: Take 1 tablet (20 mg total) by mouth 2 (two) times a day   Patient not taking: Reported on 1/4/2020   metFORMIN (GLUCOPHAGE) 1000 MG tablet   Yes Yes   Sig: Take 1,000 mg by mouth   methocarbamol (ROBAXIN) 500 mg tablet   No Yes   Sig: Take 2 tablets (1,000 mg total) by mouth every 8 (eight) hours as needed for muscle spasms for up to 7 days   omeprazole (PriLOSEC) 40 MG capsule   Yes Yes   Sig: Take 40 mg by mouth daily   ondansetron (ZOFRAN-ODT) 4 mg disintegrating tablet   Yes Yes   Sig: Take 4 mg by mouth every 8 (eight) hours as needed   oxyCODONE (OXY-IR) 5 MG capsule   Yes Yes   Sig: Take 5 mg by mouth every 4 (four) hours as needed for moderate pain      Facility-Administered Medications: None       Past Medical History:   Diagnosis Date    Asthma     Chronic pancreatitis ([facility] )     Diabetes mellitus ([facility] )     Hypertension        Past Surgical History:   Procedure Laterality Date    ABDOMINAL SURGERY      FRACTURE SURGERY         History reviewed  No pertinent family history  I have reviewed and agree with the history as documented  Social History     Tobacco Use    Smoking status: Current Every Day Smoker     Packs/day: 1 00     Types: Cigarettes    Smokeless tobacco: Never Used   Substance Use Topics    Alcohol use: No    Drug use: Yes     Types: Marijuana     Comment: daily         Review of Systems   Constitutional: Negative  HENT: Negative  Eyes: Negative  Respiratory: Negative  Cardiovascular: Negative  Gastrointestinal: Positive for abdominal distention, abdominal pain and nausea  Negative for vomiting  Endocrine: Negative  Genitourinary: Negative  Musculoskeletal: Negative  Skin: Negative  Allergic/Immunologic: Negative  Neurological: Negative  Hematological: Negative  Psychiatric/Behavioral: Negative  Physical Exam  Physical Exam   Constitutional: He is oriented to person, place, and time  He appears well-developed and well-nourished  Non-toxic appearance  He appears ill  He appears distressed  HENT:   Head: Normocephalic and atraumatic  Mouth/Throat: Oropharynx is clear and moist    Eyes: Pupils are equal, round, and reactive to light  EOM are normal    Cardiovascular: Normal rate, regular rhythm and normal heart sounds     Pulmonary/Chest: Effort normal and breath sounds normal    Abdominal: He exhibits distension  Bowel sounds are decreased  There is generalized tenderness  Neurological: He is alert and oriented to person, place, and time  Skin: Skin is warm and dry  Capillary refill takes less than 2 seconds  Psychiatric: He has a normal mood and affect  His behavior is normal    Nursing note and vitals reviewed        Vital Signs  ED Triage Vitals [01/04/20 2308]   Temperature Pulse Respirations Blood Pressure SpO2   97 7 °F (36 5 °C) 85 18 151/82 94 %      Temp Source Heart Rate Source Patient Position - Orthostatic VS BP Location FiO2 (%)   Oral Monitor Sitting Right arm --      Pain Score       Worst Possible Pain           Vitals:    01/04/20 2308 01/05/20 0141 01/05/20 0314   BP: 151/82 112/74 91/54   Pulse: 85 77 61   Patient Position - Orthostatic VS: Sitting Sitting Sitting         Visual Acuity      ED Medications  Medications   sodium chloride 0 9 % bolus 1,000 mL (0 mL Intravenous Stopped 1/5/20 0135)   ondansetron (ZOFRAN) injection 4 mg (4 mg Intravenous Given 1/5/20 0000)   HYDROmorphone (DILAUDID) injection 1 mg (1 mg Intravenous Given 1/5/20 0006)   iohexol (OMNIPAQUE) 350 MG/ML injection (SINGLE-DOSE) 100 mL (100 mL Intravenous Given 1/5/20 0134)   ketorolac (TORADOL) injection 15 mg (15 mg Intravenous Given 1/5/20 0159)       Diagnostic Studies  Results Reviewed     Procedure Component Value Units Date/Time    Fingerstick Glucose (POCT) [[id number]]  (Abnormal) Collected:  01/05/20 0213    Lab Status:  Final result Updated:  01/05/20 0214     POC Glucose 279 mg/dl     Comprehensive metabolic panel [[id number]]  (Abnormal) Collected:  01/04/20 2357    Lab Status:  Final result Specimen:  Blood from Arm, Right Updated:  01/05/20 0112     Sodium 132 mmol/L      Potassium 3 8 mmol/L      Chloride 95 mmol/L      CO2 29 mmol/L      ANION GAP 8 mmol/L      BUN 16 mg/dL      Creatinine 0 93 mg/dL      Glucose 329 mg/dL      Calcium 8 5 mg/dL      AST 23 U/L      ALT 27 U/L      Alkaline Phosphatase 87 U/L      Total Protein 7 7 g/dL      Albumin 4 0 g/dL      Total Bilirubin 0 45 mg/dL      eGFR 102 ml/min/1 73sq m     Narrative:       [place] guidelines for Chronic Kidney Disease (CKD):     Stage 1 with normal or high GFR (GFR > 90 mL/min/1 73 square meters)    Stage 2 Mild CKD (GFR = 60-89 mL/min/1 73 square meters)    Stage 3A Moderate CKD (GFR = 45-59 mL/min/1 73 square meters)    Stage 3B Moderate CKD (GFR = 30-44 mL/min/1 73 square meters)    Stage 4 Severe CKD (GFR = 15-29 mL/min/1 73 square meters)    Stage 5 End Stage CKD (GFR <15 mL/min/1 73 square meters)  Note: GFR calculation is accurate only with a steady state creatinine    Magnesium [[id number]]  (Normal) Collected:  01/04/20 2357    Lab Status:  Final result Specimen:  Blood from Arm, Right Updated:  01/05/20 0112     Magnesium 1 6 mg/dL     Lipase [[id number]]  (Abnormal) Collected:  01/04/20 2357    Lab Status:  Final result Specimen:  Blood from Arm, Right Updated:  01/05/20 0112     Lipase 621 u/L     CBC and differential [[id number]] Collected:  01/04/20 2357    Lab Status:  Final result Specimen:  Blood from Arm, Right Updated:  01/05/20 0007     WBC 6 69 Thousand/uL      RBC 5 21 Million/uL      Hemoglobin 14 1 g/dL      Hematocrit 42 5 %      MCV 82 fL      MCH 27 1 pg      MCHC 33 2 g/dL      RDW 12 7 %      MPV 9 7 fL      Platelets 281 Thousands/uL      nRBC 0 /100 WBCs      Neutrophils Relative 75 %      Immat GRANS % 1 %      Lymphocytes Relative 17 %      Monocytes Relative 6 %      Eosinophils Relative 1 %      Basophils Relative 0 %      Neutrophils Absolute 4 98 Thousands/µL      Immature Grans Absolute 0 04 Thousand/uL      Lymphocytes Absolute 1 13 Thousands/µL      Monocytes Absolute 0 43 Thousand/µL      Eosinophils Absolute 0 08 Thousand/µL      Basophils Absolute 0 03 Thousands/µL     POCT urinalysis dipstick [[id number]]  (Abnormal) Resulted:  01/04/20 2355    Lab Status:  Final result Specimen:  Urine Updated:  01/04/20 2355     Color, UA yellow     Clarity, UA clear    Urine Macroscopic, POC [[id number]]  (Abnormal) Collected:  01/04/20 2353    Lab Status:  Final result Specimen:  Urine Updated:  01/04/20 2355     Color, UA Yellow     Clarity, UA Clear     pH, UA 7 0     Leukocytes, UA Negative     Nitrite, UA Negative     Protein, UA Negative mg/dl      Glucose,  (1/2%) mg/dl      Ketones, UA Negative mg/dl      Urobilinogen, UA 0 2 E U /dl      Bilirubin, UA Negative     Blood, UA Negative     Specific Gravity, UA 1 025    Narrative:       CLINITEK RESULT                 CT abdomen pelvis with contrast   Final Result by Nick Mejia MD (01/05 0259)      Reidentified several coarse and dystrophic calcifications at the distal pancreatic body and tail suggestive of chronic pancreatitis  There is no significant peripancreatic inflammatory stranding to suggest acute pancreatitis  Correlation with serum    lipase and amylase levels is recommended  No free air or free fluid  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 and submucosal gastric varices  Workstation performed: [id number]                    Procedures  Procedures         ED Course  ED Course as of Jan 05 0504   John Victor Jan 05, 2020   0158 Lipase 621 - this is near baseline for pt  Glucose 329 - no , gap - do not believe DKA - pt is on glucophage he admits to not checking BS other wise labs unremarkable  Lipase(!): 621   0238 Repeat  after 1 liter IVF        0301 IMPRESSION:     Reidentified several coarse and dystrophic calcifications at the distal pancreatic body and tail suggestive of chronic pancreatitis  There is no significant peripancreatic inflammatory stranding to suggest acute pancreatitis    Correlation with serum   lipase and amylase levels is recommended      No free air or free fluid      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 and submucosal gastric varices          0305 Pt sleeping, snoring  Awakened and stated he still had some pain  All labs and radiology results reviewed and discussed with pt  I have informed pt that lipase is baseline and CT does not reveal any new acute changes  Informed would try some ice chips and if keeps down pt will be d/c home  Pt agrees  pt keeping fluids down  Stable for discharge  Pt is awake, alert and ambulatory with a steady gait  He denies any pain at this time  He verbalizes understanding of all dc instructions and follow up      BP 91/54 (BP Location: Right arm)   Pulse 61   Temp 97 7 °F (36 5 °C) (Oral)   Resp 18   Wt 102 kg (224 lb 6 9 oz)   SpO2 93%   BMI 32 20 kg/m²                               MDM  Number of Diagnoses or Management Options  Diagnosis management comments: Abdominal pain with distention    Plan  Labs  Urine  IV  Pain control  IVF  CT A/P        Amount and/or Complexity of Data Reviewed  Clinical lab tests: reviewed and ordered  Tests in the radiology section of CPT®: ordered and reviewed  Review and summarize past medical records: yes          Disposition  Final diagnoses:   Diabetes mellitus with hyperglycemia (HCC)   Chronic pancreatitis (HCC)   Nausea vomiting and diarrhea   Gastroenteritis   Hypertension     Time reflects when diagnosis was documented in both MDM as applicable and the Disposition within this note     Time User Action Codes Description Comment    1/5/2020  3:09 AM Dori Shorts Add [E11 65] Diabetes mellitus with hyperglycemia ([facility] )     1/5/2020  3:10 AM Dori Shorts Add [K86 1] Chronic pancreatitis ([facility] )     1/5/2020  3:10 AM Dori Shorts Add [R11 2,  R19 7] Nausea vomiting and diarrhea     1/5/2020  3:10 AM Dori Shorts Add [K52 9] Gastroenteritis     1/5/2020  3:10 AM Dori Shorts Modify [E11 65] Diabetes mellitus with hyperglycemia ([facility] )     1/5/2020  3:10 AM Dori Shorts Modify [K52 9] Gastroenteritis 1/5/2020  3:11 AM Irene Solorzano Add [I10] Hypertension       ED Disposition     ED Disposition Condition Date/Time Comment    Discharge Stable Sun Jan 5, 2020  3:34 AM Kari Fountain discharge to home/self care              Follow-up Information    None         Discharge Medication List as of 1/5/2020  3:36 AM      CONTINUE these medications which have NOT CHANGED    Details   albuterol (2 5 mg/3 mL) 0 083 % nebulizer solution Inhale 2 5 mg every 4 (four) hours as needed, Starting Thu 7/25/2019, Historical Med      albuterol (PROVENTIL HFA,VENTOLIN HFA) 90 mcg/act inhaler Inhale 2 puffs every 4 (four) hours as needed, Starting Th 7/25/2019, Historical Med      amLODIPine (NORVASC) 10 mg tablet Take 10 mg by mouth daily, Starting Thu 7/25/2019, Historical Med      carvedilol (COREG) 12 5 mg tablet Take 25 mg by mouth, Starting Th 9/19/2019, Until Fri 9/18/2020, Historical Med      chlorthalidone 25 mg tablet Take 50 mg by mouth daily, Starting Thu 7/25/2019, Historical Med      EPINEPHrine (EPIPEN) 0 3 mg/0 3 mL SOAJ Inject 0 3 mg into a muscle, Starting Fri 5/31/2019, Historical Med      metFORMIN (GLUCOPHAGE) 1000 MG tablet Take 1,000 mg by mouth, Starting Th 7/25/2019, Historical Med      methocarbamol (ROBAXIN) 500 mg tablet Take 2 tablets (1,000 mg total) by mouth every 8 (eight) hours as needed for muscle spasms for up to 7 days, Starting Thu 10/11/2018, Until Sat 1/4/2020, Print      NON FORMULARY Medical marijuana- vaporized prn, Historical Med      omeprazole (PriLOSEC) 40 MG capsule Take 40 mg by mouth daily, Starting Th 7/25/2019, Historical Med      ondansetron (ZOFRAN-ODT) 4 mg disintegrating tablet Take 4 mg by mouth every 8 (eight) hours as needed, Starting Sat 10/12/2019, Historical Med      oxyCODONE (OXY-IR) 5 MG capsule Take 5 mg by mouth every 4 (four) hours as needed for moderate pain, Historical Med      Pancrelipase, Lip-Prot-Amyl, (CREON PO) Take by mouth, Historical Med      dicyclomine (BENTYL) 20 mg tablet Take 1 tablet (20 mg total) by mouth 2 (two) times a day, Starting Wed 7/24/2019, Print           No discharge procedures on file      ED Provider  Electronically Signed by           Abigail Diamond  01/05/20 0819

## 2020-01-05 NOTE — ED NOTES
Placed patient on 2L nasal cannula at this time  Regular, non labored respirations  Denies SOB  Requesting medication for pain        Malachi Hargrove RN  01/05/20 7694

## 2020-01-05 NOTE — DISCHARGE INSTRUCTIONS
Your testing today did not reveal any acute process  However your glucose is elevated and should be better controlled  You are to follow up with your PCP for your glucose  You have had no medication changes  You are to drink clear liquids for the next 24 hours then slowly advance

## 2020-06-16 ENCOUNTER — HOSPITAL ENCOUNTER (EMERGENCY)
Facility: HOSPITAL | Age: 40
Discharge: LEFT BEFORE DISCHARGE | End: 2020-06-16
Attending: EMERGENCY MEDICINE
Payer: MEDICARE

## 2020-06-16 ENCOUNTER — APPOINTMENT (EMERGENCY)
Dept: RADIOLOGY | Facility: HOSPITAL | Age: 40
End: 2020-06-16
Payer: MEDICARE

## 2020-06-16 VITALS
WEIGHT: 215 LBS | HEART RATE: 96 BPM | BODY MASS INDEX: 30.85 KG/M2 | OXYGEN SATURATION: 97 % | TEMPERATURE: 98.9 F | RESPIRATION RATE: 18 BRPM | DIASTOLIC BLOOD PRESSURE: 88 MMHG | SYSTOLIC BLOOD PRESSURE: 127 MMHG

## 2020-06-16 DIAGNOSIS — M54.9 BACK PAIN: Primary | ICD-10-CM

## 2020-06-16 DIAGNOSIS — K08.89 PAIN, DENTAL: ICD-10-CM

## 2020-06-16 PROCEDURE — 99283 EMERGENCY DEPT VISIT LOW MDM: CPT

## 2020-06-16 PROCEDURE — 72100 X-RAY EXAM L-S SPINE 2/3 VWS: CPT

## 2020-06-16 PROCEDURE — 99283 EMERGENCY DEPT VISIT LOW MDM: CPT | Performed by: PSYCHIATRY & NEUROLOGY

## 2020-06-16 RX ORDER — LIDOCAINE 50 MG/G
1 PATCH TOPICAL ONCE
Status: DISCONTINUED | OUTPATIENT
Start: 2020-06-16 | End: 2020-06-16 | Stop reason: HOSPADM

## 2020-06-16 RX ADMIN — LIDOCAINE 1 PATCH: 50 PATCH TOPICAL at 15:38

## 2022-07-05 NOTE — PROGRESS NOTES
Can you put a referral in for the dermatology group so that I can fax Patient rang for narcotic medication  Walked into room with medication, patient was alert, but sleepy  Walked out for water, came back in, patient lethargic, eyes closed, looking very groggy, could barely keep his head up and eyes open when aroused         SAFETY ADULT     Maintain or return to baseline ADL function Completed     Maintain or return mobility status to optimal level Completed          PAIN - ADULT     Verbalizes/displays adequate comfort level or baseline comfort level Not Progressing          DISCHARGE PLANNING     Discharge to home or other facility with appropriate resources Progressing        GASTROINTESTINAL - ADULT     Minimal or absence of nausea and/or vomiting Progressing     Maintains adequate nutritional intake Progressing        SAFETY ADULT     Patient will remain free of falls Progressing

## 2023-06-11 PROCEDURE — 99284 EMERGENCY DEPT VISIT MOD MDM: CPT

## 2023-06-12 ENCOUNTER — HOSPITAL ENCOUNTER (EMERGENCY)
Facility: HOSPITAL | Age: 43
Discharge: HOME | End: 2023-06-12
Attending: EMERGENCY MEDICINE
Payer: COMMERCIAL

## 2023-06-12 VITALS
DIASTOLIC BLOOD PRESSURE: 90 MMHG | TEMPERATURE: 98.2 F | WEIGHT: 198.63 LBS | SYSTOLIC BLOOD PRESSURE: 156 MMHG | RESPIRATION RATE: 18 BRPM | OXYGEN SATURATION: 99 % | BODY MASS INDEX: 28.5 KG/M2 | HEART RATE: 88 BPM

## 2023-06-12 DIAGNOSIS — R07.81 RIB PAIN ON LEFT SIDE: Primary | ICD-10-CM

## 2023-06-12 RX ORDER — LIDOCAINE 40 MG/G
CREAM TOPICAL ONCE
Status: COMPLETED | OUTPATIENT
Start: 2023-06-12 | End: 2023-06-12

## 2023-06-12 RX ADMIN — LIDOCAINE 4% 1 APPLICATION.: 4 CREAM TOPICAL at 00:28
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
## 2023-06-17 VITALS
TEMPERATURE: 98.1 F | HEART RATE: 94 BPM | SYSTOLIC BLOOD PRESSURE: 126 MMHG | OXYGEN SATURATION: 94 % | DIASTOLIC BLOOD PRESSURE: 82 MMHG | RESPIRATION RATE: 16 BRPM

## 2023-06-17 PROCEDURE — 99285 EMERGENCY DEPT VISIT HI MDM: CPT

## 2023-06-18 ENCOUNTER — APPOINTMENT (EMERGENCY)
Dept: CT IMAGING | Facility: HOSPITAL | Age: 43
End: 2023-06-18
Payer: COMMERCIAL

## 2023-06-18 ENCOUNTER — HOSPITAL ENCOUNTER (EMERGENCY)
Facility: HOSPITAL | Age: 43
Discharge: HOME | End: 2023-06-18
Attending: EMERGENCY MEDICINE | Admitting: EMERGENCY MEDICINE
Payer: COMMERCIAL

## 2023-06-18 DIAGNOSIS — S22.42XA CLOSED FRACTURE OF MULTIPLE RIBS OF LEFT SIDE, INITIAL ENCOUNTER: Primary | ICD-10-CM

## 2023-06-18 PROCEDURE — 71250 CT THORAX DX C-: CPT

## 2023-06-18 PROCEDURE — G1004 CDSM NDSC: HCPCS

## 2023-06-18 PROCEDURE — 96372 THER/PROPH/DIAG INJ SC/IM: CPT

## 2023-06-18 RX ORDER — KETOROLAC TROMETHAMINE 30 MG/ML
30 INJECTION, SOLUTION INTRAMUSCULAR; INTRAVENOUS ONCE
Status: COMPLETED | OUTPATIENT
Start: 2023-06-18 | End: 2023-06-18

## 2023-06-18 RX ORDER — LIDOCAINE 50 MG/G
1 PATCH TOPICAL DAILY
Qty: 15 PATCH | Refills: 0 | Status: SHIPPED | OUTPATIENT
Start: 2023-06-18

## 2023-06-18 RX ORDER — IBUPROFEN 600 MG/1
600 TABLET ORAL EVERY 6 HOURS PRN
Qty: 30 TABLET | Refills: 0 | Status: SHIPPED | OUTPATIENT
Start: 2023-06-18

## 2023-06-18 RX ORDER — FAMOTIDINE 20 MG/1
20 TABLET, FILM COATED ORAL 2 TIMES DAILY
Qty: 20 TABLET | Refills: 0 | Status: SHIPPED | OUTPATIENT
Start: 2023-06-18 | End: 2023-06-28

## 2023-06-18 RX ORDER — CYCLOBENZAPRINE HCL 10 MG
10 TABLET ORAL 2 TIMES DAILY PRN
Qty: 20 TABLET | Refills: 0 | Status: SHIPPED | OUTPATIENT
Start: 2023-06-18

## 2023-06-18 RX ADMIN — KETOROLAC TROMETHAMINE 30 MG: 30 INJECTION, SOLUTION INTRAMUSCULAR; INTRAVENOUS at 00:58
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
## 2024-05-14 ENCOUNTER — HOSPITAL ENCOUNTER (OUTPATIENT)
Facility: HOSPITAL | Age: 44
Setting detail: OBSERVATION
Discharge: HOME | End: 2024-05-17
Attending: EMERGENCY MEDICINE | Admitting: INTERNAL MEDICINE
Payer: COMMERCIAL

## 2024-05-14 DIAGNOSIS — K86.1 CHRONIC PANCREATITIS (HCC): Primary | ICD-10-CM

## 2024-05-14 DIAGNOSIS — E78.1 FAMILIAL HYPERTRIGLYCERIDEMIA: ICD-10-CM

## 2024-05-14 DIAGNOSIS — R10.9 ABDOMINAL PAIN: ICD-10-CM

## 2024-05-14 LAB
ALBUMIN SERPL BCP-MCNC: 4.5 G/DL (ref 3.5–5)
ALP SERPL-CCNC: 85 U/L (ref 34–104)
ALT SERPL W P-5'-P-CCNC: 17 U/L (ref 7–52)
ANION GAP SERPL CALCULATED.3IONS-SCNC: 9 MMOL/L (ref 4–13)
AST SERPL W P-5'-P-CCNC: 34 U/L (ref 13–39)
BASOPHILS # BLD AUTO: 0.03 THOUSANDS/ÂΜL (ref 0–0.1)
BASOPHILS NFR BLD AUTO: 1 % (ref 0–1)
BILIRUB SERPL-MCNC: 0.67 MG/DL (ref 0.2–1)
BUN SERPL-MCNC: 16 MG/DL (ref 5–25)
CALCIUM SERPL-MCNC: 9.2 MG/DL (ref 8.4–10.2)
CHLORIDE SERPL-SCNC: 94 MMOL/L (ref 96–108)
CO2 SERPL-SCNC: 27 MMOL/L (ref 21–32)
CREAT SERPL-MCNC: 1.04 MG/DL (ref 0.6–1.3)
EOSINOPHIL # BLD AUTO: 0.1 THOUSAND/ÂΜL (ref 0–0.61)
EOSINOPHIL NFR BLD AUTO: 2 % (ref 0–6)
ERYTHROCYTE [DISTWIDTH] IN BLOOD BY AUTOMATED COUNT: 14.6 % (ref 11.6–15.1)
GFR SERPL CREATININE-BSD FRML MDRD: 85 ML/MIN/1.73SQ M
GLUCOSE SERPL-MCNC: 222 MG/DL (ref 65–140)
HCT VFR BLD AUTO: 45.5 % (ref 36.5–49.3)
HGB BLD-MCNC: 14.7 G/DL (ref 12–17)
IMM GRANULOCYTES # BLD AUTO: 0 THOUSAND/UL (ref 0–0.2)
IMM GRANULOCYTES NFR BLD AUTO: 0 % (ref 0–2)
LIPASE SERPL-CCNC: 20 U/L (ref 11–82)
LYMPHOCYTES # BLD AUTO: 1.36 THOUSANDS/ÂΜL (ref 0.6–4.47)
LYMPHOCYTES NFR BLD AUTO: 29 % (ref 14–44)
MCH RBC QN AUTO: 25.5 PG (ref 26.8–34.3)
MCHC RBC AUTO-ENTMCNC: 32.3 G/DL (ref 31.4–37.4)
MCV RBC AUTO: 79 FL (ref 82–98)
MONOCYTES # BLD AUTO: 0.4 THOUSAND/ÂΜL (ref 0.17–1.22)
MONOCYTES NFR BLD AUTO: 9 % (ref 4–12)
NEUTROPHILS # BLD AUTO: 2.75 THOUSANDS/ÂΜL (ref 1.85–7.62)
NEUTS SEG NFR BLD AUTO: 59 % (ref 43–75)
NRBC BLD AUTO-RTO: 0 /100 WBCS
PLATELET # BLD AUTO: 182 THOUSANDS/UL (ref 149–390)
PMV BLD AUTO: 9.9 FL (ref 8.9–12.7)
POTASSIUM SERPL-SCNC: 4.7 MMOL/L (ref 3.5–5.3)
PROT SERPL-MCNC: 7.5 G/DL (ref 6.4–8.4)
RBC # BLD AUTO: 5.77 MILLION/UL (ref 3.88–5.62)
SODIUM SERPL-SCNC: 130 MMOL/L (ref 135–147)
WBC # BLD AUTO: 4.64 THOUSAND/UL (ref 4.31–10.16)

## 2024-05-14 PROCEDURE — 80053 COMPREHEN METABOLIC PANEL: CPT

## 2024-05-14 PROCEDURE — 36415 COLL VENOUS BLD VENIPUNCTURE: CPT

## 2024-05-14 PROCEDURE — 99285 EMERGENCY DEPT VISIT HI MDM: CPT

## 2024-05-14 PROCEDURE — 96374 THER/PROPH/DIAG INJ IV PUSH: CPT

## 2024-05-14 PROCEDURE — 96361 HYDRATE IV INFUSION ADD-ON: CPT

## 2024-05-14 PROCEDURE — 99284 EMERGENCY DEPT VISIT MOD MDM: CPT

## 2024-05-14 PROCEDURE — 85025 COMPLETE CBC W/AUTO DIFF WBC: CPT

## 2024-05-14 PROCEDURE — 83690 ASSAY OF LIPASE: CPT

## 2024-05-14 PROCEDURE — 84478 ASSAY OF TRIGLYCERIDES: CPT | Performed by: INTERNAL MEDICINE

## 2024-05-14 RX ORDER — HYDROMORPHONE HCL/PF 1 MG/ML
1 SYRINGE (ML) INJECTION ONCE
Status: COMPLETED | OUTPATIENT
Start: 2024-05-14 | End: 2024-05-14

## 2024-05-14 RX ORDER — KETOROLAC TROMETHAMINE 30 MG/ML
15 INJECTION, SOLUTION INTRAMUSCULAR; INTRAVENOUS ONCE
Status: COMPLETED | OUTPATIENT
Start: 2024-05-15 | End: 2024-05-15

## 2024-05-14 RX ORDER — HYDROMORPHONE HCL/PF 1 MG/ML
0.5 SYRINGE (ML) INJECTION ONCE
Status: COMPLETED | OUTPATIENT
Start: 2024-05-15 | End: 2024-05-15

## 2024-05-14 RX ADMIN — HYDROMORPHONE HYDROCHLORIDE 1 MG: 1 INJECTION, SOLUTION INTRAMUSCULAR; INTRAVENOUS; SUBCUTANEOUS at 22:09

## 2024-05-14 RX ADMIN — SODIUM CHLORIDE 1000 ML: 0.9 INJECTION, SOLUTION INTRAVENOUS at 22:11

## 2024-05-15 ENCOUNTER — APPOINTMENT (EMERGENCY)
Dept: CT IMAGING | Facility: HOSPITAL | Age: 44
End: 2024-05-15
Payer: COMMERCIAL

## 2024-05-15 PROBLEM — R10.9 INTRACTABLE ABDOMINAL PAIN: Status: ACTIVE | Noted: 2024-05-15

## 2024-05-15 PROBLEM — K86.1 CHRONIC PANCREATITIS (HCC): Status: ACTIVE | Noted: 2024-05-15

## 2024-05-15 PROBLEM — F11.90 CHRONIC, CONTINUOUS USE OF OPIOIDS: Status: ACTIVE | Noted: 2024-05-15

## 2024-05-15 LAB
GLUCOSE SERPL-MCNC: 131 MG/DL (ref 65–140)
PLATELET # BLD AUTO: 109 THOUSANDS/UL (ref 149–390)
PMV BLD AUTO: 9.2 FL (ref 8.9–12.7)
TRIGL SERPL-MCNC: 767 MG/DL

## 2024-05-15 PROCEDURE — 82787 IGG 1 2 3 OR 4 EACH: CPT | Performed by: INTERNAL MEDICINE

## 2024-05-15 PROCEDURE — 96376 TX/PRO/DX INJ SAME DRUG ADON: CPT

## 2024-05-15 PROCEDURE — 99447 NTRPROF PH1/NTRNET/EHR 11-20: CPT | Performed by: INTERNAL MEDICINE

## 2024-05-15 PROCEDURE — 85049 AUTOMATED PLATELET COUNT: CPT | Performed by: PHYSICIAN ASSISTANT

## 2024-05-15 PROCEDURE — 82948 REAGENT STRIP/BLOOD GLUCOSE: CPT

## 2024-05-15 PROCEDURE — 82784 ASSAY IGA/IGD/IGG/IGM EACH: CPT | Performed by: INTERNAL MEDICINE

## 2024-05-15 PROCEDURE — 96375 TX/PRO/DX INJ NEW DRUG ADDON: CPT

## 2024-05-15 PROCEDURE — 99244 OFF/OP CNSLTJ NEW/EST MOD 40: CPT | Performed by: STUDENT IN AN ORGANIZED HEALTH CARE EDUCATION/TRAINING PROGRAM

## 2024-05-15 PROCEDURE — 74177 CT ABD & PELVIS W/CONTRAST: CPT

## 2024-05-15 PROCEDURE — 99223 1ST HOSP IP/OBS HIGH 75: CPT | Performed by: INTERNAL MEDICINE

## 2024-05-15 RX ORDER — PANTOPRAZOLE SODIUM 40 MG/1
40 TABLET, DELAYED RELEASE ORAL
Status: DISCONTINUED | OUTPATIENT
Start: 2024-05-15 | End: 2024-05-17 | Stop reason: HOSPADM

## 2024-05-15 RX ORDER — HYDROMORPHONE HCL/PF 1 MG/ML
0.5 SYRINGE (ML) INJECTION EVERY 6 HOURS PRN
Status: DISCONTINUED | OUTPATIENT
Start: 2024-05-15 | End: 2024-05-15

## 2024-05-15 RX ORDER — INSULIN GLARGINE 100 [IU]/ML
20 INJECTION, SOLUTION SUBCUTANEOUS
Status: DISCONTINUED | OUTPATIENT
Start: 2024-05-15 | End: 2024-05-17 | Stop reason: HOSPADM

## 2024-05-15 RX ORDER — CARVEDILOL 25 MG/1
25 TABLET ORAL 2 TIMES DAILY WITH MEALS
Status: DISCONTINUED | OUTPATIENT
Start: 2024-05-15 | End: 2024-05-17 | Stop reason: HOSPADM

## 2024-05-15 RX ORDER — AMLODIPINE BESYLATE 10 MG/1
10 TABLET ORAL DAILY
Status: DISCONTINUED | OUTPATIENT
Start: 2024-05-15 | End: 2024-05-15

## 2024-05-15 RX ORDER — FAMOTIDINE 20 MG/1
20 TABLET, FILM COATED ORAL 2 TIMES DAILY
Status: DISCONTINUED | OUTPATIENT
Start: 2024-05-15 | End: 2024-05-17 | Stop reason: HOSPADM

## 2024-05-15 RX ORDER — OXYCODONE HYDROCHLORIDE 10 MG/1
20 TABLET ORAL EVERY 4 HOURS PRN
Status: DISCONTINUED | OUTPATIENT
Start: 2024-05-15 | End: 2024-05-17 | Stop reason: HOSPADM

## 2024-05-15 RX ORDER — HYDROMORPHONE HCL/PF 1 MG/ML
1 SYRINGE (ML) INJECTION EVERY 4 HOURS PRN
Status: DISCONTINUED | OUTPATIENT
Start: 2024-05-15 | End: 2024-05-17 | Stop reason: HOSPADM

## 2024-05-15 RX ORDER — HYDROMORPHONE HCL/PF 1 MG/ML
1 SYRINGE (ML) INJECTION EVERY 4 HOURS PRN
Status: DISCONTINUED | OUTPATIENT
Start: 2024-05-15 | End: 2024-05-15

## 2024-05-15 RX ORDER — NICOTINE 21 MG/24HR
1 PATCH, TRANSDERMAL 24 HOURS TRANSDERMAL DAILY
Status: DISCONTINUED | OUTPATIENT
Start: 2024-05-15 | End: 2024-05-17 | Stop reason: HOSPADM

## 2024-05-15 RX ORDER — MORPHINE SULFATE 4 MG/ML
4 INJECTION, SOLUTION INTRAMUSCULAR; INTRAVENOUS ONCE
Status: COMPLETED | OUTPATIENT
Start: 2024-05-15 | End: 2024-05-15

## 2024-05-15 RX ORDER — HEPARIN SODIUM 5000 [USP'U]/ML
5000 INJECTION, SOLUTION INTRAVENOUS; SUBCUTANEOUS EVERY 8 HOURS SCHEDULED
Status: DISCONTINUED | OUTPATIENT
Start: 2024-05-15 | End: 2024-05-17 | Stop reason: HOSPADM

## 2024-05-15 RX ORDER — INSULIN LISPRO 100 [IU]/ML
1-5 INJECTION, SOLUTION INTRAVENOUS; SUBCUTANEOUS EVERY 6 HOURS
Status: DISCONTINUED | OUTPATIENT
Start: 2024-05-15 | End: 2024-05-17 | Stop reason: HOSPADM

## 2024-05-15 RX ORDER — SODIUM CHLORIDE, SODIUM GLUCONATE, SODIUM ACETATE, POTASSIUM CHLORIDE, MAGNESIUM CHLORIDE, SODIUM PHOSPHATE, DIBASIC, AND POTASSIUM PHOSPHATE .53; .5; .37; .037; .03; .012; .00082 G/100ML; G/100ML; G/100ML; G/100ML; G/100ML; G/100ML; G/100ML
125 INJECTION, SOLUTION INTRAVENOUS CONTINUOUS
Status: DISCONTINUED | OUTPATIENT
Start: 2024-05-15 | End: 2024-05-16

## 2024-05-15 RX ORDER — INSULIN GLARGINE 100 [IU]/ML
16 INJECTION, SOLUTION SUBCUTANEOUS
Status: DISCONTINUED | OUTPATIENT
Start: 2024-05-15 | End: 2024-05-15

## 2024-05-15 RX ADMIN — SODIUM CHLORIDE 1000 ML: 0.9 INJECTION, SOLUTION INTRAVENOUS at 05:10

## 2024-05-15 RX ADMIN — HYDROMORPHONE HYDROCHLORIDE 1 MG: 1 INJECTION, SOLUTION INTRAMUSCULAR; INTRAVENOUS; SUBCUTANEOUS at 23:31

## 2024-05-15 RX ADMIN — SODIUM CHLORIDE, SODIUM GLUCONATE, SODIUM ACETATE, POTASSIUM CHLORIDE, MAGNESIUM CHLORIDE, SODIUM PHOSPHATE, DIBASIC, AND POTASSIUM PHOSPHATE 125 ML/HR: .53; .5; .37; .037; .03; .012; .00082 INJECTION, SOLUTION INTRAVENOUS at 08:00

## 2024-05-15 RX ADMIN — CARVEDILOL 25 MG: 25 TABLET, FILM COATED ORAL at 08:09

## 2024-05-15 RX ADMIN — FAMOTIDINE 20 MG: 20 TABLET, FILM COATED ORAL at 17:04

## 2024-05-15 RX ADMIN — INSULIN GLARGINE 16 UNITS: 100 INJECTION, SOLUTION SUBCUTANEOUS at 09:39

## 2024-05-15 RX ADMIN — FAMOTIDINE 20 MG: 20 TABLET, FILM COATED ORAL at 08:09

## 2024-05-15 RX ADMIN — CARVEDILOL 25 MG: 25 TABLET, FILM COATED ORAL at 16:23

## 2024-05-15 RX ADMIN — HYDROMORPHONE HYDROCHLORIDE 1 MG: 1 INJECTION, SOLUTION INTRAMUSCULAR; INTRAVENOUS; SUBCUTANEOUS at 13:36

## 2024-05-15 RX ADMIN — IOHEXOL 100 ML: 350 INJECTION, SOLUTION INTRAVENOUS at 03:56

## 2024-05-15 RX ADMIN — PANTOPRAZOLE SODIUM 40 MG: 40 TABLET, DELAYED RELEASE ORAL at 08:09

## 2024-05-15 RX ADMIN — OXYCODONE HYDROCHLORIDE 20 MG: 10 TABLET ORAL at 08:10

## 2024-05-15 RX ADMIN — INSULIN GLARGINE 20 UNITS: 100 INJECTION, SOLUTION SUBCUTANEOUS at 22:19

## 2024-05-15 RX ADMIN — HYDROMORPHONE HYDROCHLORIDE 1 MG: 1 INJECTION, SOLUTION INTRAMUSCULAR; INTRAVENOUS; SUBCUTANEOUS at 19:09

## 2024-05-15 RX ADMIN — OXYCODONE HYDROCHLORIDE 20 MG: 10 TABLET ORAL at 16:24

## 2024-05-15 RX ADMIN — SODIUM CHLORIDE, SODIUM GLUCONATE, SODIUM ACETATE, POTASSIUM CHLORIDE, MAGNESIUM CHLORIDE, SODIUM PHOSPHATE, DIBASIC, AND POTASSIUM PHOSPHATE 125 ML/HR: .53; .5; .37; .037; .03; .012; .00082 INJECTION, SOLUTION INTRAVENOUS at 16:28

## 2024-05-15 RX ADMIN — OXYCODONE HYDROCHLORIDE 20 MG: 10 TABLET ORAL at 20:32

## 2024-05-15 RX ADMIN — HYDROMORPHONE HYDROCHLORIDE 0.5 MG: 0.5 INJECTION, SOLUTION INTRAMUSCULAR; INTRAVENOUS; SUBCUTANEOUS at 00:00

## 2024-05-15 RX ADMIN — HYDROMORPHONE HYDROCHLORIDE 0.5 MG: 1 INJECTION, SOLUTION INTRAMUSCULAR; INTRAVENOUS; SUBCUTANEOUS at 09:39

## 2024-05-15 RX ADMIN — MORPHINE SULFATE 4 MG: 4 INJECTION INTRAVENOUS at 04:05

## 2024-05-15 RX ADMIN — OXYCODONE HYDROCHLORIDE 20 MG: 10 TABLET ORAL at 12:15

## 2024-05-15 RX ADMIN — INSULIN LISPRO 1 UNITS: 100 INJECTION, SOLUTION INTRAVENOUS; SUBCUTANEOUS at 19:37

## 2024-05-15 RX ADMIN — KETOROLAC TROMETHAMINE 15 MG: 30 INJECTION, SOLUTION INTRAMUSCULAR; INTRAVENOUS at 00:00

## 2024-05-15 NOTE — ASSESSMENT & PLAN NOTE
Not on ac.  Had prior IR embolization to reduce risk of portal htn in 2016  W/mild splenomegaly on ct a/p likely on basis of chronic recurrent pancreatitis

## 2024-05-15 NOTE — NURSING NOTE
Patient refused skin assessment upon admission, refused angus, and also refused blood sugar check stating that he can give results from his machine. MD made aware of refusals.
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
## 2024-05-15 NOTE — PLAN OF CARE
Problem: PAIN - ADULT  Goal: Verbalizes/displays adequate comfort level or baseline comfort level  Description: Interventions:  - Encourage patient to monitor pain and request assistance  - Assess pain using appropriate pain scale  - Administer analgesics based on type and severity of pain and evaluate response  - Implement non-pharmacological measures as appropriate and evaluate response  - Consider cultural and social influences on pain and pain management  - Notify physician/advanced practitioner if interventions unsuccessful or patient reports new pain  Outcome: Progressing     Problem: INFECTION - ADULT  Goal: Absence or prevention of progression during hospitalization  Description: INTERVENTIONS:  - Assess and monitor for signs and symptoms of infection  - Monitor lab/diagnostic results  - Monitor all insertion sites, i.e. indwelling lines, tubes, and drains  - Monitor endotracheal if appropriate and nasal secretions for changes in amount and color  - [place] appropriate cooling/warming therapies per order  - Administer medications as ordered  - Instruct and encourage patient and family to use good hand hygiene technique  - Identify and instruct in appropriate isolation precautions for identified infection/condition  Outcome: Progressing  Goal: Absence of fever/infection during neutropenic period  Description: INTERVENTIONS:  - Monitor WBC    Outcome: Progressing     Problem: SAFETY ADULT  Goal: Patient will remain free of falls  Description: INTERVENTIONS:  - Educate patient/family on patient safety including physical limitations  - Instruct patient to call for assistance with activity   - Consult OT/PT to assist with strengthening/mobility   - Keep Call bell within reach  - Keep bed low and locked with side rails adjusted as appropriate  - Keep care items and personal belongings within reach  - Initiate and maintain comfort rounds  - Make Fall Risk Sign visible to staff  - Apply yellow socks and bracelet  for high fall risk patients  - Consider moving patient to room near nurses station  Outcome: Progressing  Goal: Maintain or return to baseline ADL function  Description: INTERVENTIONS:  -  Assess patient's ability to carry out ADLs; assess patient's baseline for ADL function and identify physical deficits which impact ability to perform ADLs (bathing, care of mouth/teeth, toileting, grooming, dressing, etc.)  - Assess/evaluate cause of self-care deficits   - Assess range of motion  - Assess patient's mobility; develop plan if impaired  - Assess patient's need for assistive devices and provide as appropriate  - Encourage maximum independence but intervene and supervise when necessary  - Involve family in performance of ADLs  - Assess for home care needs following discharge   - Consider OT consult to assist with ADL evaluation and planning for discharge  - Provide patient education as appropriate  Outcome: Progressing  Goal: Maintains/Returns to pre admission functional level  Description: INTERVENTIONS:  - Perform AM-PAC 6 Click Basic Mobility/ Daily Activity assessment daily.  - Set and communicate daily mobility goal to care team and patient/family/caregiver.   - Collaborate with rehabilitation services on mobility goals if consulted  - Out of bed for toileting  - Record patient progress and toleration of activity level   Outcome: Progressing     Problem: DISCHARGE PLANNING  Goal: Discharge to home or other facility with appropriate resources  Description: INTERVENTIONS:  - Identify barriers to discharge w/patient and caregiver  - Arrange for needed discharge resources and transportation as appropriate  - Identify discharge learning needs (meds, wound care, etc.)  - Arrange for interpretive services to assist at discharge as needed  - Refer to Case Management Department for coordinating discharge planning if the patient needs post-hospital services based on physician/advanced practitioner order or complex needs  related to functional status, cognitive ability, or social support system  Outcome: Progressing     Problem: Knowledge Deficit  Goal: Patient/family/caregiver demonstrates understanding of disease process, treatment plan, medications, and discharge instructions  Description: Complete learning assessment and assess knowledge base.  Interventions:  - Provide teaching at level of understanding  - Provide teaching via preferred learning methods  Outcome: Progressing

## 2024-05-15 NOTE — ED PROVIDER NOTES
History  Chief Complaint   Patient presents with    Abdominal Pain     Pt complaining of left sided abdominal pain that radiates into back x1 week. Pt has hx of pancreatitis and states that this feels similar. Prescribed oxycodone is not relieving pain.      44-year-old male with PMH of chronic pancreatitis, DM, HTN, asthma presents for evaluation of abdominal pain and back pain this been worsening x 1 week.  Pain primarily LUQ and epigastric, radiates to back.  Pain is constant.  States he has pancreatitis and this feels similar to prior flareups.  Has been taking his oxycodone 20 mg tablets at home and states that he is still in severe pain.  Admits to some diarrhea but denies nausea and vomiting.    From patient's history and chart review it appears he has been admitted numerous times in the past for chronic/acute on chronic pancreatitis.  During many of these visits he had normal lipase, CT scans consistently showed evidence of chronic pancreatitis without any evidence of acute pancreatitis.  However has had to be admitted for intractable pain during those visits.  Patient is chronically on opioids for his pancreatitis as well as back problems.        Prior to Admission Medications   Prescriptions Last Dose Informant Patient Reported? Taking?   Diclofenac Sodium (VOLTAREN) 1 %   No Yes   Sig: Apply 2 g topically 4 (four) times a day   EPINEPHrine (EPIPEN) 0.3 mg/0.3 mL SOAJ   Yes Yes   Sig: Inject 0.3 mg into a muscle   NON FORMULARY   Yes Yes   Sig: Medical marijuana- vaporized prn   Pancrelipase, Lip-Prot-Amyl, (CREON PO)   Yes Yes   Sig: Take by mouth   albuterol (2.5 mg/3 mL) 0.083 % nebulizer solution   Yes Yes   Sig: Inhale 2.5 mg every 4 (four) hours as needed   albuterol (PROVENTIL HFA,VENTOLIN HFA) 90 mcg/act inhaler   Yes Yes   Sig: Inhale 2 puffs every 4 (four) hours as needed   amLODIPine (NORVASC) 10 mg tablet   Yes Yes   Sig: Take 10 mg by mouth daily   chlorthalidone 25 mg tablet   Yes Yes   Sig:  Take 50 mg by mouth daily   cyclobenzaprine (FLEXERIL) 10 mg tablet Not Taking  No No   Sig: Take 1 tablet (10 mg total) by mouth 2 (two) times a day as needed for muscle spasms   Patient not taking: Reported on 5/15/2024   dicyclomine (BENTYL) 20 mg tablet Not Taking  No No   Sig: Take 1 tablet (20 mg total) by mouth 2 (two) times a day   Patient not taking: Reported on 1/4/2020   famotidine (PEPCID) 20 mg tablet   No No   Sig: Take 1 tablet (20 mg total) by mouth 2 (two) times a day for 10 days   ibuprofen (MOTRIN) 600 mg tablet   No No   Sig: Take 1 tablet (600 mg total) by mouth every 6 (six) hours as needed for moderate pain   lidocaine (LIDODERM) 5 %   No Yes   Sig: Apply 1 patch topically over 12 hours daily Remove & Discard patch within 12 hours or as directed by MD   metFORMIN (GLUCOPHAGE) 1000 MG tablet   Yes Yes   Sig: Take 1,000 mg by mouth   omeprazole (PriLOSEC) 40 MG capsule   Yes Yes   Sig: Take 40 mg by mouth daily   ondansetron (ZOFRAN-ODT) 4 mg disintegrating tablet   Yes Yes   Sig: Take 4 mg by mouth every 8 (eight) hours as needed   oxyCODONE (OXY-IR) 5 MG capsule   Yes Yes   Sig: Take 5 mg by mouth every 4 (four) hours as needed for moderate pain      Facility-Administered Medications: None       Past Medical History:   Diagnosis Date    Asthma     Chronic pancreatitis (HCC)     Diabetes mellitus (HCC)     Hypertension        Past Surgical History:   Procedure Laterality Date    ABDOMINAL SURGERY      FRACTURE SURGERY         History reviewed. No pertinent family history.  I have reviewed and agree with the history as documented.    E-Cigarette/Vaping     E-Cigarette/Vaping Substances     Social History     Tobacco Use    Smoking status: Every Day     Current packs/day: 1.00     Types: Cigarettes    Smokeless tobacco: Never   Substance Use Topics    Alcohol use: No    Drug use: Yes     Types: Marijuana     Comment: daily        Review of Systems   Constitutional:  Negative for chills and fever.    HENT:  Negative for ear pain and sore throat.    Eyes:  Negative for pain and visual disturbance.   Respiratory:  Negative for cough and shortness of breath.    Cardiovascular:  Negative for chest pain and palpitations.   Gastrointestinal:  Positive for abdominal pain and diarrhea. Negative for vomiting.   Genitourinary:  Negative for dysuria and hematuria.   Musculoskeletal:  Positive for back pain. Negative for arthralgias.   Skin:  Negative for color change and rash.   Neurological:  Negative for seizures and syncope.   All other systems reviewed and are negative.      Physical Exam  Physical Exam  Vitals and nursing note reviewed.   Constitutional:       General: He is in acute distress.      Appearance: He is well-developed.   HENT:      Head: Normocephalic and atraumatic.   Eyes:      Conjunctiva/sclera: Conjunctivae normal.   Cardiovascular:      Rate and Rhythm: Normal rate and regular rhythm.      Heart sounds: No murmur heard.  Pulmonary:      Effort: Pulmonary effort is normal. No respiratory distress.      Breath sounds: Normal breath sounds.   Abdominal:      Palpations: Abdomen is soft.      Tenderness: There is abdominal tenderness in the epigastric area and left upper quadrant.   Musculoskeletal:         General: No swelling.      Cervical back: Neck supple.   Skin:     General: Skin is warm and dry.      Capillary Refill: Capillary refill takes less than 2 seconds.   Neurological:      Mental Status: He is alert.   Psychiatric:         Mood and Affect: Mood normal.         Vital Signs  ED Triage Vitals   Temperature Pulse Respirations Blood Pressure SpO2   05/14/24 2113 05/14/24 2113 05/14/24 2113 05/14/24 2113 05/14/24 2113   98.1 °F (36.7 °C) 84 16 125/79 93 %      Temp Source Heart Rate Source Patient Position - Orthostatic VS BP Location FiO2 (%)   05/14/24 2113 05/14/24 2113 05/14/24 2346 05/14/24 2113 --   Oral Monitor Lying Right arm       Pain Score       05/14/24 2209       10 - Worst  Possible Pain           Vitals:    05/15/24 0354 05/15/24 0400 05/15/24 0415 05/15/24 0500   BP: 102/61  90/50 116/69   Pulse: 67 60 59 60   Patient Position - Orthostatic VS: Lying  Lying Lying         Visual Acuity      ED Medications  Medications   sodium chloride 0.9 % bolus 1,000 mL (1,000 mL Intravenous New Bag 5/15/24 0510)   sodium chloride 0.9 % bolus 1,000 mL (0 mL Intravenous Stopped 5/14/24 2351)   HYDROmorphone (DILAUDID) injection 1 mg (1 mg Intravenous Given 5/14/24 2209)   HYDROmorphone (DILAUDID) injection 0.5 mg (0.5 mg Intravenous Given 5/15/24 0000)   ketorolac (TORADOL) injection 15 mg (15 mg Intravenous Given 5/15/24 0000)   iohexol (OMNIPAQUE) 350 MG/ML injection (MULTI-DOSE) 100 mL (100 mL Intravenous Given 5/15/24 0356)   morphine injection 4 mg (4 mg Intravenous Given 5/15/24 0405)       Diagnostic Studies  Results Reviewed       Procedure Component Value Units Date/Time    Comprehensive metabolic panel [[id number]]  (Abnormal) Collected: 05/14/24 2209    Lab Status: Final result Specimen: Blood from Arm, Right Updated: 05/14/24 2252     Sodium 130 mmol/L      Potassium 4.7 mmol/L      Chloride 94 mmol/L      CO2 27 mmol/L      ANION GAP 9 mmol/L      BUN 16 mg/dL      Creatinine 1.04 mg/dL      Glucose 222 mg/dL      Calcium 9.2 mg/dL      AST 34 U/L      ALT 17 U/L      Alkaline Phosphatase 85 U/L      Total Protein 7.5 g/dL      Albumin 4.5 g/dL      Total Bilirubin 0.67 mg/dL      eGFR 85 ml/min/1.73sq m     Narrative:      [employer] guidelines for Chronic Kidney Disease (CKD):     Stage 1 with normal or high GFR (GFR > 90 mL/min/1.73 square meters)    Stage 2 Mild CKD (GFR = 60-89 mL/min/1.73 square meters)    Stage 3A Moderate CKD (GFR = 45-59 mL/min/1.73 square meters)    Stage 3B Moderate CKD (GFR = 30-44 mL/min/1.73 square meters)    Stage 4 Severe CKD (GFR = 15-29 mL/min/1.73 square meters)    Stage 5 End Stage CKD (GFR <15 mL/min/1.73 square meters)  Note:  GFR calculation is accurate only with a steady state creatinine    Lipase [[id number]]  (Normal) Collected: 05/14/24 2209    Lab Status: Final result Specimen: Blood from Arm, Right Updated: 05/14/24 2252     Lipase 20 u/L     CBC and differential [[id number]]  (Abnormal) Collected: 05/14/24 2209    Lab Status: Final result Specimen: Blood from Arm, Right Updated: 05/14/24 2220     WBC 4.64 Thousand/uL      RBC 5.77 Million/uL      Hemoglobin 14.7 g/dL      Hematocrit 45.5 %      MCV 79 fL      MCH 25.5 pg      MCHC 32.3 g/dL      RDW 14.6 %      MPV 9.9 fL      Platelets 182 Thousands/uL      nRBC 0 /100 WBCs      Segmented % 59 %      Immature Grans % 0 %      Lymphocytes % 29 %      Monocytes % 9 %      Eosinophils Relative 2 %      Basophils Relative 1 %      Absolute Neutrophils 2.75 Thousands/µL      Absolute Immature Grans 0.00 Thousand/uL      Absolute Lymphocytes 1.36 Thousands/µL      Absolute Monocytes 0.40 Thousand/µL      Eosinophils Absolute 0.10 Thousand/µL      Basophils Absolute 0.03 Thousands/µL                    CT abdomen pelvis with contrast   Final Result by Chandler Eaton MD (05/15 0431)      No acute intra-abdominal abnormality. No free air or free fluid.      Reidentified several coarse and dystrophic calcifications at the distal pancreatic body and tail suggestive of chronic pancreatitis.  There is no peripancreatic inflammatory stranding to suggest acute pancreatitis.      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            Workstation performed: [id number]                    Procedures  Procedures         ED Course  ED Course as of 05/15/24 0526   Wed May 15, 2024   0126 Reassess patient's pain following 2nd round of meds. If not improving, will need CT scan and consider admission.                                             Medical Decision Making  44-year-old male presents for evaluation of upper abdominal pain that radiates to his back, worsening  throughout the last week.  States it feels similar to prior flareups of his chronic pancreatitis.  Has been taking oxycodone 20 mg tablets at home without substantial relief.  On exam appears uncomfortable, tender to palpation of upper abdomen, vitals WNL.  Workup: CBC, CMP, lipase.  Management: Analgesia, fluids.    Patient still complaining of pain following initial Dilaudid injection.  Next round will be Toradol and a small dose of Dilaudid.  Labs all unremarkable compared to baseline.  Lipase is 20.  Educated patient on this, he states that his labs are always normal and a CT generally does not show any findings when he is seen in the ER for flareups of his pancreatitis.  Upon chart review it appears that he has been admitted multiple times for intractable abdominal pain and during many of these visits he did have normal labs and CT showing chronic pancreatitis.  Advised patient that if he has continued need for analgesia we will likely need to consider admission, at which time we will also likely need to obtain CT scan to rule out other possible causes of his pain.  He understands and is agreeable.    Patient reevaluated.  Initially noted to be sleeping, had been recorded that patient had some intermittent drops in his oxygen saturation to about 91%.  Patient woke and he is complaining again of abdominal pain.  Patient has a heating pad that he brought with and is using for relief.  Advised patient that we need to be careful getting more narcotic pain medications due to his drop in oxygen and blood pressure being a little bit low.  Patient expresses understanding of this.  Will also give more fluids.  Advised patient that if we decide to admit for further pain control we will need to first obtain a CT to rule out other possible causes of his pain, he understands and is agreeable.  Will proceed with CT scan.    CT findings consistent with chronic pancreatitis, coarse and dystrophic calcifications of the distal  pancreatic body and tail.  Discussed case with Hilda Cristobal, medicine.  Will admit for supportive care.  Patient care transferred to im, hemodynamically stable.    Amount and/or Complexity of Data Reviewed  Labs: ordered.  Radiology: ordered.    Risk  Prescription drug management.  Decision regarding hospitalization.             Disposition  Final diagnoses:   Chronic pancreatitis (HCC)   Abdominal pain     Time reflects when diagnosis was documented in both MDM as applicable and the Disposition within this note       Time User Action Codes Description Comment    5/15/2024  4:43 AM Ian Hendrix [K86.1] Chronic pancreatitis (HCC)     5/15/2024  4:43 AM Ian Hendrix [R10.9] Abdominal pain           ED Disposition       ED Disposition   Admit    Condition   Stable    Date/Time   Wed May 15, 2024  4:43 AM    Comment   Case was discussed with Hilda Cristobal and the patient's admission status was agreed to be Admission Status: observation status to the service of Dr. Serrano.               Follow-up Information    None         Patient's Medications   Discharge Prescriptions    No medications on file       No discharge procedures on file.    PDMP Review         Value Time User    PDMP Reviewed  Yes 5/15/2024  5:17 AM Hilda Cristobal PA-C            ED Provider  Electronically Signed by             Ian Hendrix PA-C  05/15/24 0526

## 2024-05-15 NOTE — CONSULTS
e-Consult ([facility])  - Endocrinology  Davian Landin 44 y.o. male MRN: [id number]  Unit/Bed#: [place] [address] -01 Encounter: [id number]          Endocrinology has been consulted to evaluate Davian Landin    We were consulted by ROSHAN concerning this patient with hypertriglyceridemia and chronic pancreatitis. On review of chart, he has hx of known chronic pancreatitis and partial pancreatectomy. Has ahd episodes of necrotizing pancreatitis. There is also reference to prior insulin use. He is currently admitted with another pancreatitis episode.     On review of chart, Jardiance 25mg daily, metformin 1g twice daily, and Lantus 35units once daily is listed in the most recent [facility] note. It also records use of a Dexcom g6. Currently NPO. He has received Lantus 16units today.   Bgs currently is 157. Last A1c 7.1% in Dec 2023    Outpatient notes state he takes Lovaza 4g daily and fenofibrate 160mg daily.   Triglyceride from 5/14/24 was 767. 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 Rare occurences of pancreatitis with SGLT 2 have been reported so would not resume this. Currentrly NPO with FSBGs. Please add correctional insulin scale. I will ask SLIM to check an A1c and c peptide for pancreatic reserve. May need meal insulin when eating again. Can also increase Lantus to 20units daily for now    Hypertriglyceridemia:  would add omega 3 Fas 2g twice daily and fenofibrate 160mg daily again if able to take pills.       11-20 minutes, >50% of the total time devoted to medical consultative verbal/EMR discussion between providers. Written report will be generated in the EMR.     Thank you for allowing Endocrinology to participate in the care of Davian  Mushtaq. Please don't hesitate to call or TigerText the clinician covering [facility] with any questions.     Leti Mayfield MD

## 2024-05-15 NOTE — H&P
"[facility]  H&P  Name: Davian Landin 44 y.o. male I MRN: [id number]  Unit/Bed#: [place] [address] [place] [address]-01 I Date of Admission: 5/14/2024   Date of Service: 5/15/2024 I Hospital Day: 0      Assessment & Plan   * Intractable abdominal pain  Assessment & Plan  Suspect acute on chronic pancreatitis.  Pt reports traditionally this was from hypertriglyceridemia although notably listed as idiopathic from [facility] records  Did have a cocktail at a boxing match with his brother 3 days prior to s/sx onset however which may have contributed to current suspected flare  Ct a/p w/o acute pathology save chronic calcified pseudocysts and pancreatitis  Bowel rest ivf analgesics    Chronic, continuous use of opioids  Assessment & Plan  Pdmp confirmed oxycodone 20mg q 4h prn for spinal stenosis followed by neurosx  Continue op regimen w/judicial use of breakthrough narcotics for abd pain to minimize risk of increasing tolerance as indicated    Chronic pancreatitis (HCC)  Assessment & Plan  W/hx of necrotizing pancreatitis and abdominal compartment syndrome  Hold creon while npo    Type 2 diabetes mellitus with hyperglycemia, without long-term current use of insulin (HCC)  Assessment & Plan  Lab Results   Component Value Date    HGBA1C 7.1 (H) 12/20/2023       No results for input(s): \"POCGLU\" in the last 72 hours.    Blood Sugar Average: Last 72 hrs:  Hold metformin jardiance  Continue op lantus at 16 iu (38 iu at baseline) add ssi/poc bs while npo      Chronic thrombosis of splenic vein  Assessment & Plan  Not on ac.  Had prior IR embolization to reduce risk of portal htn in 2016  W/mild splenomegaly on ct a/p likely on basis of chronic recurrent pancreatitis         VTE Pharmacologic Prophylaxis: VTE Score: 5 High Risk (Score >/= 5) - Pharmacological DVT Prophylaxis Ordered: enoxaparin (Lovenox). Sequential Compression Devices Ordered.  Code Status: Level 1 - Full Code   Discussion with family:     Anticipated Length " of Stay: Patient will be admitted on an observation basis with an anticipated length of stay of less than 2 midnights secondary to intractable abd pain.    Total Time Spent on Date of Encounter in care of patient:  mins. This time was spent on one or more of the following: performing physical exam; counseling and coordination of care; obtaining or reviewing history; documenting in the medical record; reviewing/ordering tests, medications or procedures; communicating with other healthcare professionals and discussing with patient's family/caregivers.    Chief Complaint: abd pain x4-5d    History of Present Illness:  aDvian Landin is a 44 y.o. male with a PMH of chronic pancreatitis c/b necrotizing pancreatitis w/abdominal compartment syndrome, hx of cholecystectomy, prior alcohol use, hypertriglyceridemia, iddm, htn asthma who presents with abd pain.  Pt notes abd pain x4-5 days in upper abdomen worse w/oral intake and constant w/associated nausea no vomiting and loose stools.  No blood noted or hx of igb.  No fevers/chills sick contacts.  Was going to come in because chronic pain meds were not helping with analgesia but his father passed over the weekend and who's  is this coming 24.  .Finally came in on 24 because he was tired of dealing with the pain.  He does note that he believes this happened because earlier in the week he had a drink at a boxing match with his brother which he doesn't do due to his prior hx.  Last time he was admitted for acute on chronic pancreatitis was approx 5 mo ago at [facility].    In ed he despite multiple analgesics patient's pain was poorly controlled.  Ct a/p was stable and negative for acute pathology. Admission was requested for possible acute on chronic pancreatitis/intractable abd pain.    Review of Systems:  Review of Systems   Constitutional:  Negative for appetite change, chills and fever.   Respiratory:  Negative for shortness of breath.    Cardiovascular:   Negative for chest pain.   Gastrointestinal:  Positive for abdominal pain, diarrhea and nausea.   All other systems reviewed and are negative.      Past Medical and Surgical History:   Past Medical History:   Diagnosis Date    Asthma     Chronic pancreatitis (HCC)     Chronic, continuous use of opioids 05/15/2024    Diabetes mellitus (HCC)     Hypertension        Past Surgical History:   Procedure Laterality Date    ABDOMINAL SURGERY      FRACTURE SURGERY         Meds/Allergies:  Prior to Admission medications    Medication Sig Start Date End Date Taking? Authorizing Provider   albuterol (2.5 mg/3 mL) 0.083 % nebulizer solution Inhale 2.5 mg every 4 (four) hours as needed 7/25/19  Yes Historical Provider, MD   albuterol (PROVENTIL HFA,VENTOLIN HFA) 90 mcg/act inhaler Inhale 2 puffs every 4 (four) hours as needed 7/25/19  Yes Historical Provider, MD   amLODIPine (NORVASC) 10 mg tablet Take 10 mg by mouth daily 7/25/19  Yes Historical Provider, MD   chlorthalidone 25 mg tablet Take 50 mg by mouth daily 7/25/19  Yes Historical Provider, MD   Diclofenac Sodium (VOLTAREN) 1 % Apply 2 g topically 4 (four) times a day 6/18/23  Yes Luca Concepcion Jr., DO   EPINEPHrine (EPIPEN) 0.3 mg/0.3 mL SOAJ Inject 0.3 mg into a muscle 5/31/19  Yes Historical Provider, MD   lidocaine (LIDODERM) 5 % Apply 1 patch topically over 12 hours daily Remove & Discard patch within 12 hours or as directed by MD 6/18/23  Yes Luca Concepcion Jr., DO   metFORMIN (GLUCOPHAGE) 1000 MG tablet Take 1,000 mg by mouth 7/25/19  Yes Historical Provider, MD   NON FORMULARY Medical marijuana- vaporized prn   Yes Historical Provider, MD   omeprazole (PriLOSEC) 40 MG capsule Take 40 mg by mouth daily 7/25/19  Yes Historical Provider, MD   ondansetron (ZOFRAN-ODT) 4 mg disintegrating tablet Take 4 mg by mouth every 8 (eight) hours as needed 10/12/19  Yes Historical Provider, MD   oxyCODONE (OXY-IR) 5 MG capsule Take 5 mg by mouth every 4 (four) hours as  needed for moderate pain   Yes Historical Provider, MD   Pancrelipase, Lip-Prot-Amyl, (CREON PO) Take by mouth   Yes Historical Provider, MD   cyclobenzaprine (FLEXERIL) 10 mg tablet Take 1 tablet (10 mg total) by mouth 2 (two) times a day as needed for muscle spasms  Patient not taking: Reported on 5/15/2024 6/18/23   Luca Concepcion Jr., DO   dicyclomine (BENTYL) 20 mg tablet Take 1 tablet (20 mg total) by mouth 2 (two) times a day  Patient not taking: Reported on 1/4/2020 7/24/19   Sydney Gamino MD   famotidine (PEPCID) 20 mg tablet Take 1 tablet (20 mg total) by mouth 2 (two) times a day for 10 days 6/18/23 6/28/23  Luca Concepcion Jr., DO   ibuprofen (MOTRIN) 600 mg tablet Take 1 tablet (600 mg total) by mouth every 6 (six) hours as needed for moderate pain 6/18/23   Luca Concepcion Jr., DO     I have reviewed home medications with patient personally.    Allergies:   Allergies   Allergen Reactions    Shellfish-Derived Products - Food Allergy        Social History:  Marital Status: Single   Occupation:   Patient Pre-hospital Living Situation:   Patient Pre-hospital Level of Mobility:   Patient Pre-hospital Diet Restrictions:   Substance Use History:   Social History     Substance and Sexual Activity   Alcohol Use No     Social History     Tobacco Use   Smoking Status Every Day    Current packs/day: 1.00    Types: Cigarettes   Smokeless Tobacco Never     Social History     Substance and Sexual Activity   Drug Use Yes    Types: Marijuana    Comment: daily        Family History:  History reviewed. No pertinent family history.    Physical Exam:     Vitals:   Blood Pressure: 124/74 (05/15/24 0602)  Pulse: 55 (05/15/24 0602)  Temperature: (!) 97.4 °F (36.3 °C) (05/15/24 0602)  Temp Source: Oral (05/15/24 0602)  Respirations: 18 (05/15/24 0602)  Weight - Scale: 92.4 kg (203 lb 11.3 oz) (05/15/24 0602)  SpO2: 96 % (05/15/24 0602)    Physical Exam  Vitals reviewed.   Constitutional:       Comments: Appears  uncomfortable stated age nad   HENT:      Head: Normocephalic and atraumatic.      Right Ear: External ear normal.      Left Ear: External ear normal.   Cardiovascular:      Rate and Rhythm: Normal rate and regular rhythm.      Heart sounds: No murmur heard.     No friction rub. No gallop.   Pulmonary:      Breath sounds: No wheezing, rhonchi or rales.   Abdominal:      General: There is no distension.      Palpations: Abdomen is soft. There is no mass.      Tenderness: There is abdominal tenderness. There is guarding. There is no rebound.      Hernia: No hernia is present.      Comments: Ventral incisional scar noted as well as trochar scars in RUQ LUQ LLQ RLQ noted all well healed   Musculoskeletal:      Left lower leg: No edema.   Neurological:      Mental Status: He is alert. Mental status is at baseline.   Psychiatric:         Mood and Affect: Mood normal.          Additional Data:     Lab Results:  Results from last 7 days   Lab Units 05/14/24  2209   WBC Thousand/uL 4.64   HEMOGLOBIN g/dL 14.7   HEMATOCRIT % 45.5   PLATELETS Thousands/uL 182   SEGS PCT % 59   LYMPHO PCT % 29   MONO PCT % 9   EOS PCT % 2     Results from last 7 days   Lab Units 05/14/24  2209   SODIUM mmol/L 130*   POTASSIUM mmol/L 4.7   CHLORIDE mmol/L 94*   CO2 mmol/L 27   BUN mg/dL 16   CREATININE mg/dL 1.04   ANION GAP mmol/L 9   CALCIUM mg/dL 9.2   ALBUMIN g/dL 4.5   TOTAL BILIRUBIN mg/dL 0.67   ALK PHOS U/L 85   ALT U/L 17   AST U/L 34   GLUCOSE RANDOM mg/dL 222*                       Lines/Drains:  Invasive Devices       Peripheral Intravenous Line  Duration             Peripheral IV 05/14/24 Dorsal (posterior);Proximal;Right Forearm <1 day                        Imaging: Reviewed radiology reports from this admission including: abdominal/pelvic CT  CT abdomen pelvis with contrast   Final Result by Chandler Eaton MD (05/15 0629)   Addendum (preliminary) 1 of 1 by Chandler Eaton MD (05/15 0629)   ADDENDUM:      In the body of  "the report, in the pancreas section, it should read that    there is \"no significant peripancreatic inflammatory stranding\".      Final      No acute intra-abdominal abnormality. No free air or free fluid.      Reidentified several coarse and dystrophic calcifications at the distal pancreatic body and tail suggestive of chronic pancreatitis.  There is no peripancreatic inflammatory stranding to suggest acute pancreatitis.      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            Workstation performed: [id number]             EKG and Other Studies Reviewed on Admission:   EKG:     ** Please Note: This note has been constructed using a voice recognition system. **      "

## 2024-05-15 NOTE — ASSESSMENT & PLAN NOTE
Pdmp confirmed oxycodone 20mg q 4h prn for spinal stenosis followed by neurosx  Continue op regimen w/judicial use of breakthrough narcotics for abd pain to minimize risk of increasing tolerance as indicated

## 2024-05-15 NOTE — CONSULTS
Consult Service: Gastroenterology      PATIENT INFORMATION      Davian Landin 44 y.o. male MRN: [id number]  Unit/Bed#: [place] [address] -01 Encounter: [id number]  PCP: Estella Castro MD  Date of Admission:  5/14/2024  Date of Consultation: 05/15/24  Requesting Physician: Antonio Cisse MD       ASSESSMENTS & PLAN   Davian Landin is a 44 y.o. old male with PMH of  familial hypertriglyceridemia c/b chronic pancreatitis 2/2 partial pancreatectomy (2015) 2/2 necrotizing pancreatitis + pancreatic stent 2/2016-3/2016 also c/b splenic vein thrombosis s/p IR embolization 2016, DM, HTN, asthma, spinal stenosis w chronic pain on opioids presenting with abdominal pain, with GI consulted for this.    Acute on Chronic Pancreatitis w hx of Necrotizing Pancreatitis s/p Partial Pancreatectomy  Suspect current abd pain c/w AoC pancreatitis given description of sx  Pain control per primary team   LR at 10 mL/kg/hr   NPO for now - advance diet as tolerated  F/u AM CBC  Check IgG4 (does not appear to have been checked in our records), triglyceride levels     REASON FOR CONSULTATION      Pancreatitis    HISTORY OF PRESENT ILLNESS      Davian Landin is a 44 y.o. male with PMH of familial hypertriglyceridemia c/b chronic pancreatitis 2/2 partial pancreatectomy (2015) 2/2 necrotizing pancreatitis + pancreatic stent 2/2016-3/2016 also c/b splenic vein thrombosis s/p IR embolization 2016, DM, HTN, asthma, spinal stenosis w chronic pain on opioids presenting with abdominal pain, with GI consulted for this.    Patient states his current sx began several days ago, and describes pain as epigastric and consistent with prior episodes of acute on chronic pancreatitis. CT A/P w contrast from today shows chronic pancreatitis w calcified pseudocyts, no new findings, stable chronic splenic vein occlusion w multiple upper abdominal varices.       REVIEW OF SYSTEMS     A thorough 12-point review of systems has been conducted. Pertinent positives and  negatives are mentioned in the history of present illness.       PAST MEDICAL & SURGICAL HISTORY      Past Medical History:   Diagnosis Date    Asthma     Chronic pancreatitis (HCC)     Chronic, continuous use of opioids 05/15/2024    Diabetes mellitus (HCC)     Hypertension        Past Surgical History:   Procedure Laterality Date    ABDOMINAL SURGERY      FRACTURE SURGERY           MEDICATIONS & ALLERGIES       Medications:   Prior to Admission medications    Medication Sig Start Date End Date Taking? Authorizing Provider   albuterol (2.5 mg/3 mL) 0.083 % nebulizer solution Inhale 2.5 mg every 4 (four) hours as needed 7/25/19  Yes Historical Provider, MD   albuterol (PROVENTIL HFA,VENTOLIN HFA) 90 mcg/act inhaler Inhale 2 puffs every 4 (four) hours as needed 7/25/19  Yes Historical Provider, MD   amLODIPine (NORVASC) 10 mg tablet Take 10 mg by mouth daily 7/25/19  Yes Historical Provider, MD   chlorthalidone 25 mg tablet Take 50 mg by mouth daily 7/25/19  Yes Historical Provider, MD   Diclofenac Sodium (VOLTAREN) 1 % Apply 2 g topically 4 (four) times a day 6/18/23  Yes Luca Concepcion Jr., DO   EPINEPHrine (EPIPEN) 0.3 mg/0.3 mL SOAJ Inject 0.3 mg into a muscle 5/31/19  Yes Historical Provider, MD   lidocaine (LIDODERM) 5 % Apply 1 patch topically over 12 hours daily Remove & Discard patch within 12 hours or as directed by MD 6/18/23  Yes Luca Concepcion Jr., DO   metFORMIN (GLUCOPHAGE) 1000 MG tablet Take 1,000 mg by mouth 7/25/19  Yes Historical Provider, MD   NON FORMULARY Medical marijuana- vaporized prn   Yes Historical Provider, MD   omeprazole (PriLOSEC) 40 MG capsule Take 40 mg by mouth daily 7/25/19  Yes Historical Provider, MD   ondansetron (ZOFRAN-ODT) 4 mg disintegrating tablet Take 4 mg by mouth every 8 (eight) hours as needed 10/12/19  Yes Historical Provider, MD   oxyCODONE (OXY-IR) 5 MG capsule Take 5 mg by mouth every 4 (four) hours as needed for moderate pain   Yes Historical Provider, MD    Pancrelipase, Lip-Prot-Amyl, (CREON PO) Take by mouth   Yes Historical Provider, MD   cyclobenzaprine (FLEXERIL) 10 mg tablet Take 1 tablet (10 mg total) by mouth 2 (two) times a day as needed for muscle spasms  Patient not taking: Reported on 5/15/2024 6/18/23   Luca Concepcion Jr., DO   dicyclomine (BENTYL) 20 mg tablet Take 1 tablet (20 mg total) by mouth 2 (two) times a day  Patient not taking: Reported on 1/4/2020 7/24/19   Sydney Gamino MD   famotidine (PEPCID) 20 mg tablet Take 1 tablet (20 mg total) by mouth 2 (two) times a day for 10 days 6/18/23 6/28/23  Luca Concepcion Jr., DO   ibuprofen (MOTRIN) 600 mg tablet Take 1 tablet (600 mg total) by mouth every 6 (six) hours as needed for moderate pain 6/18/23   Luca Concepcion Jr., DO       Allergies:   Allergies   Allergen Reactions    Shellfish-Derived Products - Food Allergy          SOCIAL HISTORY      Marital Status: Single    Substance Use History:   Social History     Substance and Sexual Activity   Alcohol Use No     Social History     Tobacco Use   Smoking Status Every Day    Current packs/day: 1.00    Types: Cigarettes   Smokeless Tobacco Never     Social History     Substance and Sexual Activity   Drug Use Yes    Types: Marijuana    Comment: daily          FAMILY HISTORY      Non-Contributory      PHYSICAL EXAM     Vitals:   Blood Pressure: 129/86 (05/15/24 0754)  Pulse: 59 (05/15/24 0754)  Temperature: 97.5 °F (36.4 °C) (05/15/24 0754)  Temp Source: Oral (05/15/24 0602)  Respirations: 18 (05/15/24 0602)  Weight - Scale: 92.4 kg (203 lb 11.3 oz) (05/15/24 0602)  SpO2: 94 % (05/15/24 0754)    Physical Exam:   GENERAL: NAD  HEENT:  NC/AT, No Scleral Icterus  CARDIAC:  Regular Rate  PULMONARY:  Non-Labored Breathing, No Respiratory Distress  ABDOMEN:  Soft, No Rebound/Guarding/Rigidity, No Organomegaly, Diffuse Tenderness  EXTREMITIES:  No Edema, Cyanosis, or Clubbing  NEUROLOGIC:  Alert  SKIN:  No Rashes or Erythema    ADDITIONAL DATA     Lab  "Results:       Lab Units 05/14/24 2209 03/23/24  1211 03/21/24  0349 03/20/24  0550 03/19/24  0320 01/25/23  0205 01/23/23  0444 01/22/23  0506 01/20/23  0452 01/19/23  0406 01/18/23  0414   SODIUM mmol/L 130* 133* 135 133* 135   < > 141 140 141 141 143   POTASSIUM mmol/L 4.7 3.7 3.8 4.4 3.4*   < > 3.6 3.8 3.8 3.7 3.6   CHLORIDE mmol/L 94* 90* 93* 93* 94*   < > 108 108 107 106 105   CO2 mmol/L 27 34* 33* 30 36*   < > 23 26 29 30 33*   BUN mg/dL 16 27 29* 20 21   < > 13 13 10 8 7   CREATININE mg/dL 1.04 0.91 0.85 0.65 0.75   < > 0.45* 0.54 0.44* 0.46* 0.49*   GLUCOSE RANDOM mg/dL 222* 135* 145* 189* 184*   < > 116* 145* 117* 118* 86   CALCIUM mg/dL 9.2 9.7 10.1 9.6 9.3   < > 8.1* 8.4* 8.2* 8.9 8.6   XMAGNESIUM mg/dL  --   --   --   --   --   --  1.8 1.6 1.9 1.7 1.7   PHOSPHORUS mg/dL  --   --   --   --   --   --  3.2 3.0 3.3 4.1 3.5    < > = values in this interval not displayed.            Lab Units 05/14/24 2209 03/23/24  1211 03/21/24  0349 03/20/24  0550 03/19/24  0320   TOTAL PROTEIN g/dL 7.5 7.9 7.6 7.5 6.7   ALBUMIN g/dL 4.5 4.8 4.6 4.4 4.1   TOTAL BILIRUBIN mg/dL 0.67 0.7 0.6 0.5 0.5   AST U/L 34 18 16 17 16   ALT U/L 17 22 33 35 44   ALK PHOS U/L 85 123* 132* 136* 143*           Lab Units 05/15/24  0635 05/14/24  2209   WBC Thousand/uL  --  4.64   HEMOGLOBIN g/dL  --  14.7   HEMATOCRIT %  --  45.5   PLATELETS Thousands/uL 109* 182   MCV fL  --  79*       No results found for: \"IRON\", \"TIBC\", \"FERRITIN\"    Lab Results   Component Value Date    INR 1.1 01/19/2023    INR 1.2 06/02/2022    INR 1.0 02/25/2020    PROTIME 14.1 04/19/2019    PROTIME 14.0 04/06/2014         Microbiology Results:        Imaging:  Procedure: CT abdomen pelvis with contrast    Addendum Date: 5/15/2024 Addendum:   ADDENDUM: In the body of the report, in the pancreas section, it should read that there is \"no significant peripancreatic inflammatory stranding\".    Result Date: 5/15/2024  Narrative: CT ABDOMEN AND PELVIS WITH IV CONTRAST " INDICATION: Upper abdominal pain radiate to back, hx pancreatitis. COMPARISON: None. TECHNIQUE: CT examination of the abdomen and pelvis was performed. Multiplanar 2D reformatted images were created from the source data. This examination, like all CT scans performed in the [facility], was performed utilizing techniques to minimize radiation dose exposure, including the use of iterative reconstruction and automated exposure control. Radiation dose length product (DLP) for this visit: 1151 mGy-cm IV Contrast: 100 mL of iohexol (OMNIPAQUE) Enteric Contrast: Not administered. FINDINGS: ABDOMEN LOWER CHEST: Bibasilar atelectasis. LIVER/BILIARY TREE: Unremarkable. GALLBLADDER: Post cholecystectomy. SPLEEN: The spleen is mildly enlarged measuring 14.8 cm in length. PANCREAS: Again noted are extensive coarse and dystrophic calcifications most notably involving the pancreatic tail and distal body extending to the splenic hilum.  The findings are similar in appearance to the prior exam and most compatible with chronic  pancreatitis with likely calcified pseudocysts.  Significant peripancreatic inflammatory stranding. ADRENAL GLANDS: Unremarkable. KIDNEYS/URETERS: No hydronephrosis. Subcentimeter hypoattenuating renal lesion(s), too small to characterize but statistically likely benign, which do not warrant follow-up (Radiology June 2019). STOMACH AND BOWEL: Unremarkable. APPENDIX: Normal. ABDOMINOPELVIC CAVITY: No ascites. No pneumoperitoneum. No lymphadenopathy. VESSELS: Again noted is chronic splenic vein thrombosis with multiple associated collateral vessels within the upper abdomen.  The abdominal aorta is normal in course and caliber. PELVIS REPRODUCTIVE ORGANS: Unremarkable for patient's age. URINARY BLADDER: Unremarkable. ABDOMINAL WALL/INGUINAL REGIONS: Postoperative changes of prior ventral wall hernia repair are present. BONES: No acute fracture or suspicious osseous lesion. Spinal degenerative  changes. There are bilateral L5 pars interarticularis defects with grade 1 anterolisthesis of L5 on S1.     Impression: No acute intra-abdominal abnormality. No free air or free fluid. Reidentified several coarse and dystrophic calcifications at the distal pancreatic body and tail suggestive of chronic pancreatitis.  There is no peripancreatic inflammatory stranding to suggest acute pancreatitis. 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 Workstation performed: [id number]     Narrative/Impressions - 3 day look back     EKG, Pathology, and Other Studies Reviewed on Admission:   EKG: Reviewed    PRIOR GI PROCEDURES      Counseling / Coordination of Care Time: 30 total mins spent n consult. Greater than 50% of total time spent on patient counseling and coordination of care.    ...............................................................................................................................................  Sukhdev Dejesus M.D.  PGY-5 Gastroenterology Fellow  [facility]  Available on Bike HUDt  [email or web address]  Recommendations not final until attending attestation

## 2024-05-15 NOTE — ASSESSMENT & PLAN NOTE
"Lab Results   Component Value Date    HGBA1C 7.1 (H) 12/20/2023       No results for input(s): \"POCGLU\" in the last 72 hours.    Blood Sugar Average: Last 72 hrs:  Hold metformin jardiance  Continue op lantus at 16 iu (38 iu at baseline) add ssi/poc bs while npo    "

## 2024-05-16 LAB
ALBUMIN SERPL BCP-MCNC: 3.8 G/DL (ref 3.5–5)
ALP SERPL-CCNC: 70 U/L (ref 34–104)
ALT SERPL W P-5'-P-CCNC: 12 U/L (ref 7–52)
ANION GAP SERPL CALCULATED.3IONS-SCNC: 8 MMOL/L (ref 4–13)
AST SERPL W P-5'-P-CCNC: 16 U/L (ref 13–39)
BASOPHILS # BLD AUTO: 0.01 THOUSANDS/ÂΜL (ref 0–0.1)
BASOPHILS NFR BLD AUTO: 0 % (ref 0–1)
BILIRUB SERPL-MCNC: 0.45 MG/DL (ref 0.2–1)
BUN SERPL-MCNC: 8 MG/DL (ref 5–25)
CALCIUM SERPL-MCNC: 8.4 MG/DL (ref 8.4–10.2)
CHLORIDE SERPL-SCNC: 101 MMOL/L (ref 96–108)
CHOLEST SERPL-MCNC: 245 MG/DL
CO2 SERPL-SCNC: 29 MMOL/L (ref 21–32)
CREAT SERPL-MCNC: 0.74 MG/DL (ref 0.6–1.3)
EOSINOPHIL # BLD AUTO: 0.05 THOUSAND/ÂΜL (ref 0–0.61)
EOSINOPHIL NFR BLD AUTO: 2 % (ref 0–6)
ERYTHROCYTE [DISTWIDTH] IN BLOOD BY AUTOMATED COUNT: 14.3 % (ref 11.6–15.1)
EST. AVERAGE GLUCOSE BLD GHB EST-MCNC: 169 MG/DL
GFR SERPL CREATININE-BSD FRML MDRD: 110 ML/MIN/1.73SQ M
GLUCOSE SERPL-MCNC: 100 MG/DL (ref 65–140)
HBA1C MFR BLD: 7.5 %
HCT VFR BLD AUTO: 42 % (ref 36.5–49.3)
HDLC SERPL-MCNC: 22 MG/DL
HGB BLD-MCNC: 13.1 G/DL (ref 12–17)
IMM GRANULOCYTES # BLD AUTO: 0 THOUSAND/UL (ref 0–0.2)
IMM GRANULOCYTES NFR BLD AUTO: 0 % (ref 0–2)
LDLC SERPL DIRECT ASSAY-MCNC: 90 MG/DL (ref 0–100)
LYMPHOCYTES # BLD AUTO: 0.85 THOUSANDS/ÂΜL (ref 0.6–4.47)
LYMPHOCYTES NFR BLD AUTO: 35 % (ref 14–44)
MCH RBC QN AUTO: 25.1 PG (ref 26.8–34.3)
MCHC RBC AUTO-ENTMCNC: 31.2 G/DL (ref 31.4–37.4)
MCV RBC AUTO: 81 FL (ref 82–98)
MONOCYTES # BLD AUTO: 0.2 THOUSAND/ÂΜL (ref 0.17–1.22)
MONOCYTES NFR BLD AUTO: 8 % (ref 4–12)
NEUTROPHILS # BLD AUTO: 1.34 THOUSANDS/ÂΜL (ref 1.85–7.62)
NEUTS SEG NFR BLD AUTO: 55 % (ref 43–75)
NRBC BLD AUTO-RTO: 0 /100 WBCS
PLATELET # BLD AUTO: 112 THOUSANDS/UL (ref 149–390)
PMV BLD AUTO: 9.4 FL (ref 8.9–12.7)
POTASSIUM SERPL-SCNC: 3.1 MMOL/L (ref 3.5–5.3)
PROT SERPL-MCNC: 6.2 G/DL (ref 6.4–8.4)
RBC # BLD AUTO: 5.22 MILLION/UL (ref 3.88–5.62)
SODIUM SERPL-SCNC: 138 MMOL/L (ref 135–147)
TRIGL SERPL-MCNC: 561 MG/DL
WBC # BLD AUTO: 2.45 THOUSAND/UL (ref 4.31–10.16)

## 2024-05-16 PROCEDURE — 99213 OFFICE O/P EST LOW 20 MIN: CPT | Performed by: STUDENT IN AN ORGANIZED HEALTH CARE EDUCATION/TRAINING PROGRAM

## 2024-05-16 PROCEDURE — 99232 SBSQ HOSP IP/OBS MODERATE 35: CPT | Performed by: INTERNAL MEDICINE

## 2024-05-16 PROCEDURE — 80061 LIPID PANEL: CPT | Performed by: PHYSICIAN ASSISTANT

## 2024-05-16 PROCEDURE — 83721 ASSAY OF BLOOD LIPOPROTEIN: CPT | Performed by: PHYSICIAN ASSISTANT

## 2024-05-16 PROCEDURE — 84681 ASSAY OF C-PEPTIDE: CPT | Performed by: INTERNAL MEDICINE

## 2024-05-16 PROCEDURE — 83036 HEMOGLOBIN GLYCOSYLATED A1C: CPT | Performed by: INTERNAL MEDICINE

## 2024-05-16 PROCEDURE — 80053 COMPREHEN METABOLIC PANEL: CPT | Performed by: PHYSICIAN ASSISTANT

## 2024-05-16 PROCEDURE — 85025 COMPLETE CBC W/AUTO DIFF WBC: CPT | Performed by: PHYSICIAN ASSISTANT

## 2024-05-16 RX ORDER — POTASSIUM CHLORIDE 14.9 MG/ML
20 INJECTION INTRAVENOUS ONCE
Status: COMPLETED | OUTPATIENT
Start: 2024-05-16 | End: 2024-05-16

## 2024-05-16 RX ORDER — ACETAMINOPHEN 325 MG/1
650 TABLET ORAL EVERY 6 HOURS PRN
Status: DISCONTINUED | OUTPATIENT
Start: 2024-05-16 | End: 2024-05-17 | Stop reason: HOSPADM

## 2024-05-16 RX ORDER — FENOFIBRATE 145 MG/1
145 TABLET, COATED ORAL DAILY
Status: DISCONTINUED | OUTPATIENT
Start: 2024-05-17 | End: 2024-05-17 | Stop reason: HOSPADM

## 2024-05-16 RX ORDER — OMEGA-3-ACID ETHYL ESTERS 1 G/1
2 CAPSULE, LIQUID FILLED ORAL 2 TIMES DAILY
Status: DISCONTINUED | OUTPATIENT
Start: 2024-05-16 | End: 2024-05-16 | Stop reason: RX

## 2024-05-16 RX ADMIN — PANCRELIPASE 12000 UNITS: 30000; 6000; 19000 CAPSULE, DELAYED RELEASE PELLETS ORAL at 18:35

## 2024-05-16 RX ADMIN — PANTOPRAZOLE SODIUM 40 MG: 40 TABLET, DELAYED RELEASE ORAL at 05:49

## 2024-05-16 RX ADMIN — POTASSIUM CHLORIDE 20 MEQ: 14.9 INJECTION, SOLUTION INTRAVENOUS at 09:12

## 2024-05-16 RX ADMIN — ACETAMINOPHEN 650 MG: 325 TABLET, FILM COATED ORAL at 16:45

## 2024-05-16 RX ADMIN — HYDROMORPHONE HYDROCHLORIDE 1 MG: 1 INJECTION, SOLUTION INTRAMUSCULAR; INTRAVENOUS; SUBCUTANEOUS at 07:51

## 2024-05-16 RX ADMIN — HYDROMORPHONE HYDROCHLORIDE 1 MG: 1 INJECTION, SOLUTION INTRAMUSCULAR; INTRAVENOUS; SUBCUTANEOUS at 11:49

## 2024-05-16 RX ADMIN — OXYCODONE HYDROCHLORIDE 20 MG: 10 TABLET ORAL at 10:38

## 2024-05-16 RX ADMIN — OXYCODONE HYDROCHLORIDE 20 MG: 10 TABLET ORAL at 18:35

## 2024-05-16 RX ADMIN — CARVEDILOL 25 MG: 25 TABLET, FILM COATED ORAL at 16:29

## 2024-05-16 RX ADMIN — OXYCODONE HYDROCHLORIDE 20 MG: 10 TABLET ORAL at 14:39

## 2024-05-16 RX ADMIN — FAMOTIDINE 20 MG: 20 TABLET, FILM COATED ORAL at 07:51

## 2024-05-16 RX ADMIN — HYDROMORPHONE HYDROCHLORIDE 1 MG: 1 INJECTION, SOLUTION INTRAMUSCULAR; INTRAVENOUS; SUBCUTANEOUS at 20:42

## 2024-05-16 RX ADMIN — SODIUM CHLORIDE, SODIUM GLUCONATE, SODIUM ACETATE, POTASSIUM CHLORIDE, MAGNESIUM CHLORIDE, SODIUM PHOSPHATE, DIBASIC, AND POTASSIUM PHOSPHATE 125 ML/HR: .53; .5; .37; .037; .03; .012; .00082 INJECTION, SOLUTION INTRAVENOUS at 01:15

## 2024-05-16 RX ADMIN — HYDROMORPHONE HYDROCHLORIDE 1 MG: 1 INJECTION, SOLUTION INTRAMUSCULAR; INTRAVENOUS; SUBCUTANEOUS at 03:29

## 2024-05-16 RX ADMIN — SODIUM CHLORIDE, SODIUM GLUCONATE, SODIUM ACETATE, POTASSIUM CHLORIDE, MAGNESIUM CHLORIDE, SODIUM PHOSPHATE, DIBASIC, AND POTASSIUM PHOSPHATE 125 ML/HR: .53; .5; .37; .037; .03; .012; .00082 INJECTION, SOLUTION INTRAVENOUS at 09:19

## 2024-05-16 RX ADMIN — HYDROMORPHONE HYDROCHLORIDE 1 MG: 1 INJECTION, SOLUTION INTRAMUSCULAR; INTRAVENOUS; SUBCUTANEOUS at 16:29

## 2024-05-16 RX ADMIN — OXYCODONE HYDROCHLORIDE 20 MG: 10 TABLET ORAL at 01:15

## 2024-05-16 RX ADMIN — OXYCODONE HYDROCHLORIDE 20 MG: 10 TABLET ORAL at 05:49

## 2024-05-16 RX ADMIN — FAMOTIDINE 20 MG: 20 TABLET, FILM COATED ORAL at 16:29

## 2024-05-16 NOTE — PLAN OF CARE
Problem: PAIN - ADULT  Goal: Verbalizes/displays adequate comfort level or baseline comfort level  Description: Interventions:  - Encourage patient to monitor pain and request assistance  - Assess pain using appropriate pain scale  - Administer analgesics based on type and severity of pain and evaluate response  - Implement non-pharmacological measures as appropriate and evaluate response  - Consider cultural and social influences on pain and pain management  - Notify physician/advanced practitioner if interventions unsuccessful or patient reports new pain  Outcome: Progressing     Problem: INFECTION - ADULT  Goal: Absence or prevention of progression during hospitalization  Description: INTERVENTIONS:  - Assess and monitor for signs and symptoms of infection  - Monitor lab/diagnostic results  - Monitor all insertion sites, i.e. indwelling lines, tubes, and drains  - Monitor endotracheal if appropriate and nasal secretions for changes in amount and color  - [place] appropriate cooling/warming therapies per order  - Administer medications as ordered  - Instruct and encourage patient and family to use good hand hygiene technique  - Identify and instruct in appropriate isolation precautions for identified infection/condition  Outcome: Progressing  Goal: Absence of fever/infection during neutropenic period  Description: INTERVENTIONS:  - Monitor WBC    Outcome: Progressing     Problem: SAFETY ADULT  Goal: Patient will remain free of falls  Description: INTERVENTIONS:  - Educate patient/family on patient safety including physical limitations  - Instruct patient to call for assistance with activity   - Consult OT/PT to assist with strengthening/mobility   - Keep Call bell within reach  - Keep bed low and locked with side rails adjusted as appropriate  - Keep care items and personal belongings within reach  - Initiate and maintain comfort rounds  - Make Fall Risk Sign visible to staff  - Offer Toileting every  Hours,  in advance of need  - Initiate/Maintain alarm  - Obtain necessary fall risk management equipment:   - Apply yellow socks and bracelet for high fall risk patients  - Consider moving patient to room near nurses station  Outcome: Progressing  Goal: Maintain or return to baseline ADL function  Description: INTERVENTIONS:  -  Assess patient's ability to carry out ADLs; assess patient's baseline for ADL function and identify physical deficits which impact ability to perform ADLs (bathing, care of mouth/teeth, toileting, grooming, dressing, etc.)  - Assess/evaluate cause of self-care deficits   - Assess range of motion  - Assess patient's mobility; develop plan if impaired  - Assess patient's need for assistive devices and provide as appropriate  - Encourage maximum independence but intervene and supervise when necessary  - Involve family in performance of ADLs  - Assess for home care needs following discharge   - Consider OT consult to assist with ADL evaluation and planning for discharge  - Provide patient education as appropriate  Outcome: Progressing  Goal: Maintains/Returns to pre admission functional level  Description: INTERVENTIONS:  - Perform AM-PAC 6 Click Basic Mobility/ Daily Activity assessment daily.  - Set and communicate daily mobility goal to care team and patient/family/caregiver.   - Collaborate with rehabilitation services on mobility goals if consulted  - Perform Range of Motion  times a day.  - Reposition patient every  hours.  - Dangle patient  times a day  - Stand patient  times a day  - Ambulate patient  times a day  - Out of bed to chair  times a day   - Out of bed for meal times a day  - Out of bed for toileting  - Record patient progress and toleration of activity level   Outcome: Progressing     Problem: DISCHARGE PLANNING  Goal: Discharge to home or other facility with appropriate resources  Description: INTERVENTIONS:  - Identify barriers to discharge w/patient and caregiver  - Arrange for  needed discharge resources and transportation as appropriate  - Identify discharge learning needs (meds, wound care, etc.)  - Arrange for interpretive services to assist at discharge as needed  - Refer to Case Management Department for coordinating discharge planning if the patient needs post-hospital services based on physician/advanced practitioner order or complex needs related to functional status, cognitive ability, or social support system  Outcome: Progressing     Problem: Knowledge Deficit  Goal: Patient/family/caregiver demonstrates understanding of disease process, treatment plan, medications, and discharge instructions  Description: Complete learning assessment and assess knowledge base.  Interventions:  - Provide teaching at level of understanding  - Provide teaching via preferred learning methods  Outcome: Progressing     Problem: GASTROINTESTINAL - ADULT  Goal: Maintains adequate nutritional intake  Description: INTERVENTIONS:  - Monitor percentage of each meal consumed  - Identify factors contributing to decreased intake, treat as appropriate  - Assist with meals as needed  - Monitor I&O, weight, and lab values if indicated  - Obtain nutrition services referral as needed  Outcome: Progressing     Problem: HEMATOLOGIC - ADULT  Goal: Maintains hematologic stability  Description: INTERVENTIONS  - Assess for signs and symptoms of bleeding or hemorrhage  - Monitor labs  - Administer supportive blood products/factors as ordered and appropriate  Outcome: Progressing

## 2024-05-16 NOTE — ASSESSMENT & PLAN NOTE
CT scan revealed pancreatic calcifications without peripancreatic stranding and stable chronic splenic vein occlusion    Pt reports traditionally this was from hypertriglyceridemia although notably listed as idiopathic from [facility] records Did have a cocktail at a boxing match with his brother 3 days prior to s/sx onset however which may have contributed to current suspected flare  GI input appreciated  Patient feeling better started on clear liquids, will advance as tolerated  Continue pain control  Continue Creon

## 2024-05-16 NOTE — PLAN OF CARE
Problem: PAIN - ADULT  Goal: Verbalizes/displays adequate comfort level or baseline comfort level  Description: Interventions:  - Encourage patient to monitor pain and request assistance  - Assess pain using appropriate pain scale  - Administer analgesics based on type and severity of pain and evaluate response  - Implement non-pharmacological measures as appropriate and evaluate response  - Consider cultural and social influences on pain and pain management  - Notify physician/advanced practitioner if interventions unsuccessful or patient reports new pain  Outcome: Progressing     Problem: INFECTION - ADULT  Goal: Absence or prevention of progression during hospitalization  Description: INTERVENTIONS:  - Assess and monitor for signs and symptoms of infection  - Monitor lab/diagnostic results  - Monitor all insertion sites, i.e. indwelling lines, tubes, and drains  - Monitor endotracheal if appropriate and nasal secretions for changes in amount and color  - [place] appropriate cooling/warming therapies per order  - Administer medications as ordered  - Instruct and encourage patient and family to use good hand hygiene technique  - Identify and instruct in appropriate isolation precautions for identified infection/condition  Outcome: Progressing  Goal: Absence of fever/infection during neutropenic period  Description: INTERVENTIONS:  - Monitor WBC    Outcome: Progressing     Problem: SAFETY ADULT  Goal: Patient will remain free of falls  Description: INTERVENTIONS:  - Educate patient/family on patient safety including physical limitations  - Instruct patient to call for assistance with activity   - Consult OT/PT to assist with strengthening/mobility   - Keep Call bell within reach  - Keep bed low and locked with side rails adjusted as appropriate  - Keep care items and personal belongings within reach  - Initiate and maintain comfort rounds  - Make Fall Risk Sign visible to staff  - Offer Toileting every  Hours,  in advance of need  - Initiate/Maintain alarm  - Obtain necessary fall risk management equipment:   - Apply yellow socks and bracelet for high fall risk patients  - Consider moving patient to room near nurses station  Outcome: Progressing  Goal: Maintain or return to baseline ADL function  Description: INTERVENTIONS:  -  Assess patient's ability to carry out ADLs; assess patient's baseline for ADL function and identify physical deficits which impact ability to perform ADLs (bathing, care of mouth/teeth, toileting, grooming, dressing, etc.)  - Assess/evaluate cause of self-care deficits   - Assess range of motion  - Assess patient's mobility; develop plan if impaired  - Assess patient's need for assistive devices and provide as appropriate  - Encourage maximum independence but intervene and supervise when necessary  - Involve family in performance of ADLs  - Assess for home care needs following discharge   - Consider OT consult to assist with ADL evaluation and planning for discharge  - Provide patient education as appropriate  Outcome: Progressing  Goal: Maintains/Returns to pre admission functional level  Description: INTERVENTIONS:  - Perform AM-PAC 6 Click Basic Mobility/ Daily Activity assessment daily.  - Set and communicate daily mobility goal to care team and patient/family/caregiver.   - Collaborate with rehabilitation services on mobility goals if consulted  - Perform Range of Motion  times a day.  - Reposition patient every  hours.  - Dangle patient  times a day  - Stand patient  times a day  - Ambulate patient  times a day  - Out of bed to chair  times a day   - Out of bed for meals  times a day  - Out of bed for toileting  - Record patient progress and toleration of activity level   Outcome: Progressing     Problem: DISCHARGE PLANNING  Goal: Discharge to home or other facility with appropriate resources  Description: INTERVENTIONS:  - Identify barriers to discharge w/patient and caregiver  - Arrange for  needed discharge resources and transportation as appropriate  - Identify discharge learning needs (meds, wound care, etc.)  - Arrange for interpretive services to assist at discharge as needed  - Refer to Case Management Department for coordinating discharge planning if the patient needs post-hospital services based on physician/advanced practitioner order or complex needs related to functional status, cognitive ability, or social support system  Outcome: Progressing     Problem: Knowledge Deficit  Goal: Patient/family/caregiver demonstrates understanding of disease process, treatment plan, medications, and discharge instructions  Description: Complete learning assessment and assess knowledge base.  Interventions:  - Provide teaching at level of understanding  - Provide teaching via preferred learning methods  Outcome: Progressing     Problem: GASTROINTESTINAL - ADULT  Goal: Maintains adequate nutritional intake  Description: INTERVENTIONS:  - Monitor percentage of each meal consumed  - Identify factors contributing to decreased intake, treat as appropriate  - Assist with meals as needed  - Monitor I&O, weight, and lab values if indicated  - Obtain nutrition services referral as needed  Outcome: Progressing     Problem: HEMATOLOGIC - ADULT  Goal: Maintains hematologic stability  Description: INTERVENTIONS  - Assess for signs and symptoms of bleeding or hemorrhage  - Monitor labs  - Administer supportive blood products/factors as ordered and appropriate  Outcome: Progressing

## 2024-05-16 NOTE — UTILIZATION REVIEW
Initial Clinical Review    Admission: Date/Time/Statement:   Admission Orders (From admission, onward)       Ordered        05/15/24 0444  Place in Observation  Once                          Orders Placed This Encounter   Procedures    Place in Observation     Standing Status:   Standing     Number of Occurrences:   1     Order Specific Question:   Level of Care     Answer:   Med Surg [16]     ED Arrival Information       Expected   -    Arrival   2024 21:07    Acuity   Urgent              Means of arrival   Walk-In    Escorted by   Self    Service   Hospitalist    Admission type   Emergency              Arrival complaint   Abdominal pain             Chief Complaint   Patient presents with    Abdominal Pain     Pt complaining of left sided abdominal pain that radiates into back x1 week. Pt has hx of pancreatitis and states that this feels similar. Prescribed oxycodone is not relieving pain.        Initial Presentation: 44 y.o. male to ED presents for Abdominal pain. Pt notes abd pain x4-5 days in upper abdomen worse w/oral intake and constant w/associated nausea no vomiting. Pt had plan to visit ED because chronic pain meds were not helping with analgesia but his father passed over the weekend and who's  is this coming 24.  .Finally came in on 24 because he was tired of dealing with the pain.  He does note that he believes this happened because earlier in the week he had a drink at a boxing match with his brother which he doesn't do due to his prior hx.  Last time he was admitted for acute on chronic pancreatitis was approx 5 mo ago at [facility]. In ED, despite multiple analgesics patient's pain was poorly controlled.   PMH for Chronic pancreatitis c/b necrotizing pancreatitis w/abdominal compartment syndrome, hx of cholecystectomy, prior alcohol use, hypertriglyceridemia, iddm, htn asthma.  Admit to Observation Dx; Intractable abdominal pain. Bowel rest. IVFs. Pain control.      5/15  GI  "cons; Pt most likely has a flare of his chronic pancreatitis. Labs show hyperglycemia and severe hypertriglyceridemia which may be triggering the episode.  NPO. Continue IVFs. Recommend Endocrinology consult. Pain control.    5/16  Per GI; Advance diet as tolerated.    ED Triage Vitals   Temperature Pulse Respirations Blood Pressure SpO2   05/14/24 2113 05/14/24 2113 05/14/24 2113 05/14/24 2113 05/14/24 2113   98.1 °F (36.7 °C) 84 16 125/79 93 %      Temp Source Heart Rate Source Patient Position - Orthostatic VS BP Location FiO2 (%)   05/14/24 2113 05/14/24 2113 05/14/24 2346 05/14/24 2113 --   Oral Monitor Lying Right arm       Pain Score       05/14/24 2209       10 - Worst Possible Pain          Wt Readings from Last 1 Encounters:   05/15/24 92.4 kg (203 lb 11.3 oz)     Additional Vital Signs:   05/16/24 07:46:28 97.8 °F (36.6 °C) 55 16 101/66 78 95 % -- --   05/15/24 20:52:11 97.8 °F (36.6 °C) 53 Abnormal  18 102/67 79 92 % None (Room air) Lying   05/15/24 16:23:20 98.6 °F (37 °C) 61 -- 128/86 100 92 % --      05/15/24 0500 -- 60 16 116/69 84 95 % None (Room air) Lying   05/15/24 0415 -- 59 16 90/50 64 Abnormal  94 % None (Room air) Lying     Pertinent Labs/Diagnostic Test Results:   CT abdomen pelvis with contrast   Final Result by Chandler Eaton MD (05/15 0629)   Addendum (preliminary) 1 of 1 by Chandler Eaton MD (05/15 0629)   ADDENDUM:      In the body of the report, in the pancreas section, it should read that    there is \"no significant peripancreatic inflammatory stranding\".      Final      No acute intra-abdominal abnormality. No free air or free fluid.      Reidentified several coarse and dystrophic calcifications at the distal pancreatic body and tail suggestive of chronic pancreatitis.  There is no peripancreatic inflammatory stranding to suggest acute pancreatitis.      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            Workstation performed: [id number] "               Results from last 7 days   Lab Units 05/16/24  0512 05/15/24  0635 05/14/24  2209   WBC Thousand/uL 2.45*  --  4.64   HEMOGLOBIN g/dL 13.1  --  14.7   HEMATOCRIT % 42.0  --  45.5   PLATELETS Thousands/uL 112* 109* 182   TOTAL NEUT ABS Thousands/µL 1.34*  --  2.75         Results from last 7 days   Lab Units 05/16/24  0512 05/14/24  2209   SODIUM mmol/L 138 130*   POTASSIUM mmol/L 3.1* 4.7   CHLORIDE mmol/L 101 94*   CO2 mmol/L 29 27   ANION GAP mmol/L 8 9   BUN mg/dL 8 16   CREATININE mg/dL 0.74 1.04   EGFR ml/min/1.73sq m 110 85   CALCIUM mg/dL 8.4 9.2     Results from last 7 days   Lab Units 05/16/24  0512 05/14/24  2209   AST U/L 16 34   ALT U/L 12 17   ALK PHOS U/L 70 85   TOTAL PROTEIN g/dL 6.2* 7.5   ALBUMIN g/dL 3.8 4.5   TOTAL BILIRUBIN mg/dL 0.45 0.67     Results from last 7 days   Lab Units 05/15/24  0757   POC GLUCOSE mg/dl 131     Results from last 7 days   Lab Units 05/16/24  0512 05/14/24  2209   GLUCOSE RANDOM mg/dL 100 222*       Results from last 7 days   Lab Units 05/14/24  2209   LIPASE u/L 20         ED Treatment:   Medication Administration from 05/14/2024 2106 to 05/15/2024 0551         Date/Time Order Dose Route Action     05/14/2024 2351 EDT sodium chloride 0.9 % bolus 1,000 mL 0 mL Intravenous Stopped     05/14/2024 2211 EDT sodium chloride 0.9 % bolus 1,000 mL 1,000 mL Intravenous New Bag     05/14/2024 2209 EDT HYDROmorphone (DILAUDID) injection 1 mg 1 mg Intravenous Given     05/15/2024 0000 EDT HYDROmorphone (DILAUDID) injection 0.5 mg 0.5 mg Intravenous Given     05/15/2024 0000 EDT ketorolac (TORADOL) injection 15 mg 15 mg Intravenous Given     05/15/2024 0356 EDT iohexol (OMNIPAQUE) 350 MG/ML injection (MULTI-DOSE) 100 mL 100 mL Intravenous Given     05/15/2024 0405 EDT morphine injection 4 mg 4 mg Intravenous Given     05/15/2024 0510 EDT sodium chloride 0.9 % bolus 1,000 mL 1,000 mL Intravenous New Bag          Past Medical History:   Diagnosis Date    Asthma      Chronic pancreatitis (HCC)     Chronic, continuous use of opioids 05/15/2024    Diabetes mellitus (HCC)     Hypertension      Present on Admission:   Type 2 diabetes mellitus with hyperglycemia, without long-term current use of insulin (HCC)   Chronic thrombosis of splenic vein      Admitting Diagnosis: Chronic pancreatitis (HCC) [K86.1]  Abdominal pain [R10.9]  Age/Sex: 44 y.o. male    Admission Orders:  Scheduled Medications:  carvedilol, 25 mg, Oral, BID With Meals  famotidine, 20 mg, Oral, BID  heparin (porcine), 5,000 Units, Subcutaneous, Q8H ARIANNA  insulin glargine, 20 Units, Subcutaneous, HS  insulin lispro, 1-5 Units, Subcutaneous, Q6H  nicotine, 1 patch, Transdermal, Daily  pantoprazole, 40 mg, Oral, Early Morning      Continuous IV Infusions:  multi-electrolyte, 125 mL/hr, Intravenous, Continuous      PRN Meds:  HYDROmorphone, 1 mg, Intravenous, Q4H PRN  oxyCODONE, 20 mg, Oral, Q4H PRN        IP CONSULT TO GASTROENTEROLOGY  IP CONSULT TO ENDOCRINOLOGY    Network Utilization Review Department  ATTENTION: Please call with any questions or concerns to [phone] and carefully listen to the prompts so that you are directed to the right person. All voicemails are confidential.   For Discharge needs, contact Care Management DC Support Team at [phone] opt. 2  Send all requests for admission clinical reviews, approved or denied determinations and any other requests to dedicated fax number below belonging to the campus where the patient is receiving treatment. List of dedicated fax numbers for the Facilities:  FACILITY NAME UR FAX NUMBER   ADMISSION DENIALS (Administrative/Medical Necessity) [phone]   DISCHARGE SUPPORT TEAM (NETWORK) [phone]   PARENT CHILD HEALTH (Maternity/NICU/Pediatrics) [phone]   General acute hospital [phone]   [facility] [phone]   [facility] [phone]   [facility]  [phone]   [facility] [phone]   [facility] [phone]   [facility] [phone]   [facility] [phone]   [facility] [phone]   [facility] [phone]   [facility] [phone]   [facility] [phone]

## 2024-05-16 NOTE — PROGRESS NOTES
[facility] - Progress Note  Davian Landin 44 y.o. male MRN: [id number]  Unit/Bed#: [address] 226-01 Encounter: [id number]      ASSESSMENT & PLAN:    44 y.o. old male with PMH of  familial hypertriglyceridemia c/b chronic pancreatitis 2/2 partial pancreatectomy (2015) 2/2 necrotizing pancreatitis + pancreatic stent 2/2016-3/2016 also c/b splenic vein thrombosis s/p IR embolization 2016, DM, HTN, asthma, spinal stenosis w chronic pain on opioids presenting with abdominal pain, with GI consulted for this.     Acute on Chronic Pancreatitis w hx of Necrotizing Pancreatitis s/p Partial Pancreatectomy  Pt will follow up with [facility] (prior outpt GI) and endocrinology for hypertriglyceridemia management  Advance diet as tolerated  If abdominal pain does not recur, no barrier to dc from GI standpoint  ______________________________________________________________________    SUBJECTIVE:     NAEON. Feeling better today, no pain this morning on exam. Eager to advance diet, hoping to be discharged today.    Scheduled Meds:  Current Facility-Administered Medications   Medication Dose Route Frequency Provider Last Rate    carvedilol  25 mg Oral BID With Meals Hilda Cristobal PA-C      famotidine  20 mg Oral BID Hilda Cristobal PA-C      heparin (porcine)  5,000 Units Subcutaneous Q8H [facility] Hilda Cristobal PA-C      HYDROmorphone  1 mg Intravenous Q4H PRN Antonio Cisse MD      insulin glargine  20 Units Subcutaneous HS Antonio Cisse MD      insulin lispro  1-5 Units Subcutaneous Q6H Hilda Cristobal PA-C      multi-electrolyte  125 mL/hr Intravenous Continuous Hilda Cristobal PA-C 125 mL/hr (05/16/24 0919)    nicotine  1 patch Transdermal Daily Hilda Cristobal PA-C      oxyCODONE  20 mg Oral Q4H PRN Hilda Cristobal PA-C      pantoprazole  40 mg Oral Early Morning Hilda Cristobal PA-C       Continuous Infusions:multi-electrolyte, 125 mL/hr, Last Rate: 125 mL/hr (05/16/24  "0919)      PRN Meds:.  HYDROmorphone    oxyCODONE    OBJECTIVE:     Objective   Blood pressure 101/66, pulse 55, temperature 97.8 °F (36.6 °C), temperature source Oral, resp. rate 16, height 5' 10\" (1.778 m), weight 92.4 kg (203 lb 11.3 oz), SpO2 95%. Body mass index is 29.23 kg/m².    Intake/Output Summary (Last 24 hours) at 5/16/2024 1249  Last data filed at 5/16/2024 0803  Gross per 24 hour   Intake 1020 ml   Output 600 ml   Net 420 ml       PHYSICAL EXAM:   General Appearance: Awake and alert, in no acute distress  Abdomen: Soft, non-tender, non-distended; no masses or no organomegaly    Invasive Devices       Peripheral Intravenous Line  Duration             Peripheral IV 05/14/24 Dorsal (posterior);Proximal;Right Forearm 1 day                    LAB RESULTS:      Lab Units 05/16/24  0512 05/14/24  2209 03/23/24  1211 03/21/24  0349 03/20/24  0550 01/25/23  0205 01/23/23  0444 01/22/23  0506 01/20/23  0452 01/19/23  0406 01/18/23  0414   SODIUM mmol/L 138 130* 133* 135 133*   < > 141 140 141 141 143   POTASSIUM mmol/L 3.1* 4.7 3.7 3.8 4.4   < > 3.6 3.8 3.8 3.7 3.6   CHLORIDE mmol/L 101 94* 90* 93* 93*   < > 108 108 107 106 105   CO2 mmol/L 29 27 34* 33* 30   < > 23 26 29 30 33*   BUN mg/dL 8 16 27 29* 20   < > 13 13 10 8 7   CREATININE mg/dL 0.74 1.04 0.91 0.85 0.65   < > 0.45* 0.54 0.44* 0.46* 0.49*   GLUCOSE RANDOM mg/dL 100 222* 135* 145* 189*   < > 116* 145* 117* 118* 86   CALCIUM mg/dL 8.4 9.2 9.7 10.1 9.6   < > 8.1* 8.4* 8.2* 8.9 8.6   XMAGNESIUM mg/dL  --   --   --   --   --   --  1.8 1.6 1.9 1.7 1.7   PHOSPHORUS mg/dL  --   --   --   --   --   --  3.2 3.0 3.3 4.1 3.5    < > = values in this interval not displayed.            Lab Units 05/16/24  0512 05/14/24  2209 03/23/24  1211 03/21/24  0349 03/20/24  0550   TOTAL PROTEIN g/dL 6.2* 7.5 7.9 7.6 7.5   ALBUMIN g/dL 3.8 4.5 4.8 4.6 4.4   TOTAL BILIRUBIN mg/dL 0.45 0.67 0.7 0.6 0.5   AST U/L 16 34 18 16 17   ALT U/L 12 17 22 33 35   ALK PHOS U/L 70 85 123* " "132* 136*           Lab Units 05/16/24  0512 05/15/24  0635 05/14/24  2209   WBC Thousand/uL 2.45*  --  4.64   HEMOGLOBIN g/dL 13.1  --  14.7   HEMATOCRIT % 42.0  --  45.5   PLATELETS Thousands/uL 112* 109* 182   MCV fL 81*  --  79*       No results found for: \"IRON\", \"TIBC\", \"FERRITIN\"    Lab Results   Component Value Date    INR 1.1 01/19/2023    INR 1.2 06/02/2022    INR 1.0 02/25/2020    PROTIME 14.1 04/19/2019    PROTIME 14.0 04/06/2014       RADIOLOGY RESULTS:   Procedure: CT abdomen pelvis with contrast    Addendum Date: 5/15/2024 Addendum:   ADDENDUM: In the body of the report, in the pancreas section, it should read that there is \"no significant peripancreatic inflammatory stranding\".    Result Date: 5/15/2024  Narrative: CT ABDOMEN AND PELVIS WITH IV CONTRAST INDICATION: Upper abdominal pain radiate to back, hx pancreatitis. COMPARISON: None. TECHNIQUE: CT examination of the abdomen and pelvis was performed. Multiplanar 2D reformatted images were created from the source data. This examination, like all CT scans performed in the [facility], was performed utilizing techniques to minimize radiation dose exposure, including the use of iterative reconstruction and automated exposure control. Radiation dose length product (DLP) for this visit: 1151 mGy-cm IV Contrast: 100 mL of iohexol (OMNIPAQUE) Enteric Contrast: Not administered. FINDINGS: ABDOMEN LOWER CHEST: Bibasilar atelectasis. LIVER/BILIARY TREE: Unremarkable. GALLBLADDER: Post cholecystectomy. SPLEEN: The spleen is mildly enlarged measuring 14.8 cm in length. PANCREAS: Again noted are extensive coarse and dystrophic calcifications most notably involving the pancreatic tail and distal body extending to the splenic hilum.  The findings are similar in appearance to the prior exam and most compatible with chronic  pancreatitis with likely calcified pseudocysts.  Significant peripancreatic inflammatory stranding. ADRENAL GLANDS: " Unremarkable. KIDNEYS/URETERS: No hydronephrosis. Subcentimeter hypoattenuating renal lesion(s), too small to characterize but statistically likely benign, which do not warrant follow-up (Radiology June 2019). STOMACH AND BOWEL: Unremarkable. APPENDIX: Normal. ABDOMINOPELVIC CAVITY: No ascites. No pneumoperitoneum. No lymphadenopathy. VESSELS: Again noted is chronic splenic vein thrombosis with multiple associated collateral vessels within the upper abdomen.  The abdominal aorta is normal in course and caliber. PELVIS REPRODUCTIVE ORGANS: Unremarkable for patient's age. URINARY BLADDER: Unremarkable. ABDOMINAL WALL/INGUINAL REGIONS: Postoperative changes of prior ventral wall hernia repair are present. BONES: No acute fracture or suspicious osseous lesion. Spinal degenerative changes. There are bilateral L5 pars interarticularis defects with grade 1 anterolisthesis of L5 on S1.     Impression: No acute intra-abdominal abnormality. No free air or free fluid. Reidentified several coarse and dystrophic calcifications at the distal pancreatic body and tail suggestive of chronic pancreatitis.  There is no peripancreatic inflammatory stranding to suggest acute pancreatitis. 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 Workstation performed: [id number]     Narrative/Impressions - 3 day look back     Sukhdev Dejesus M.D.  PGY-5 Gastroenterology Fellow  [facility]  Available on [employer]  [email or web address]

## 2024-05-16 NOTE — ASSESSMENT & PLAN NOTE
Lab Results   Component Value Date    HGBA1C 7.5 (H) 05/16/2024       Recent Labs     05/15/24  0757   POCGLU 131     Hold metformin jardiance  Patient has Dexcom in place and request that for monitoring blood sugars  Endocrinology input appreciated  Increased Lantus to 20 units at bedtime  Rare occurrences of pancreatitis with SGLT 2 therefore recommending not to resume Jardiance

## 2024-05-16 NOTE — PROGRESS NOTES
[facility]  Progress Note  Name: Davian Landin I  MRN: [id number]  Unit/Bed#: [place] [address] -01 I Date of Admission: 5/14/2024   Date of Service: 5/16/2024 I Hospital Day: 0    Assessment & Plan   * Acute on chronic pancreatitis (HCC)  Assessment & Plan  CT scan revealed pancreatic calcifications without peripancreatic stranding and stable chronic splenic vein occlusion    Pt reports traditionally this was from hypertriglyceridemia although notably listed as idiopathic from [facility] records Did have a cocktail at a boxing match with his brother 3 days prior to s/sx onset however which may have contributed to current suspected flare  GI input appreciated  Patient feeling better started on clear liquids, will advance as tolerated  Continue pain control  Continue Creon    Chronic, continuous use of opioids  Assessment & Plan  Pdmp confirmed oxycodone 20mg q 4h prn for spinal stenosis followed by neurosx  Dilaudid 1 mg IV every 4 hours as needed    Chronic pancreatitis (HCC)  Assessment & Plan  W/hx of necrotizing pancreatitis and abdominal compartment syndrome  Hold creon while npo    Type 2 diabetes mellitus with hyperglycemia, without long-term current use of insulin (HCC)  Assessment & Plan  Lab Results   Component Value Date    HGBA1C 7.5 (H) 05/16/2024       Recent Labs     05/15/24  0757   POCGLU 131     Hold metformin jardiance  Patient has Dexcom in place and request that for monitoring blood sugars  Endocrinology input appreciated  Increased Lantus to 20 units at bedtime  Rare occurrences of pancreatitis with SGLT 2 therefore recommending not to resume Jardiance      Familial hypertriglyceridemia  Assessment & Plan  Continue fenofibrate and Lovaza    Chronic thrombosis of splenic vein  Assessment & Plan  Not on ac.  Had prior IR embolization to reduce risk of portal htn in 2016  W/mild splenomegaly on ct a/p likely on basis of chronic recurrent pancreatitis             Mobility:  Basic  Mobility Inpatient Raw Score: 24  JH-HLM Goal: 8: Walk 250 feet or more  JH-HLM Achieved: 7: Walk 25 feet or more  JH-HLM Goal NOT achieved. Continue with multidisciplinary rounding and encourage appropriate mobility to improve upon JH-HLM goals.    VTE Pharmacologic Prophylaxis:   Pharmacologic: heparin    Patient Centered Rounds: I have performed bedside rounds with nursing staff today.    Discussions with Specialists or Other Care Team Provider: GI    Education and Discussions with Family / Patient: patient    Time Spent for Care:   More than 50% of total time spent on counseling and coordination of care as described above.    Current Length of Stay: 0 day(s)    Current Patient Status: Observation   Certification Statement: The patient will continue to require additional inpatient hospital stay due to acute on chronic pancreatitis    Discharge Plan / Estimated Discharge Date: 24h    Code Status: Level 1 - Full Code      Subjective:   Patient seen and examined at bedside, still having abdominal discomfort but would like to advance his diet    Objective:     Vitals:   Temp (24hrs), Av.8 °F (37.1 °C), Min:97.8 °F (36.6 °C), Max:100.9 °F (38.3 °C)    Temp:  [97.8 °F (36.6 °C)-100.9 °F (38.3 °C)] 100.9 °F (38.3 °C)  HR:  [53-62] 62  Resp:  [16-18] 18  BP: (101-148)/(66-94) 148/94  SpO2:  [92 %-95 %] 93 %  Body mass index is 29.23 kg/m².     Input and Output Summary (last 24 hours):       Intake/Output Summary (Last 24 hours) at 2024 1811  Last data filed at 2024 1511  Gross per 24 hour   Intake 2800 ml   Output 600 ml   Net 2200 ml       Physical Exam:    Constitutional: Patient is oriented to person, place and time, no acute distress  HEENT:  Normocephalic, atraumatic  Cardiovascular: Normal S1S2, RRR, No murmurs/rubs/gallops appreciated.  Pulmonary:  Bilateral air entry, No rhonchi/rales/wheezing appreciated  Abdominal: Soft, Bowel sounds present, Non-tender, Non-distended  Extremities:  No cyanosis,  clubbing or edema.   Neurological: Cranial nerves II-XII grossly intact, sensation intact, otherwise no focal neurological symptoms.   Skin:  Warm, dry    Additional Data:     Labs:    Results from last 7 days   Lab Units 05/16/24  0512   WBC Thousand/uL 2.45*   HEMOGLOBIN g/dL 13.1   HEMATOCRIT % 42.0   PLATELETS Thousands/uL 112*   SEGS PCT % 55   LYMPHO PCT % 35   MONO PCT % 8   EOS PCT % 2     Results from last 7 days   Lab Units 05/16/24  0512   POTASSIUM mmol/L 3.1*   CHLORIDE mmol/L 101   CO2 mmol/L 29   BUN mg/dL 8   CREATININE mg/dL 0.74   CALCIUM mg/dL 8.4   ALK PHOS U/L 70   ALT U/L 12   AST U/L 16            I Have Reviewed All Lab Data Listed Above.    Invasive Devices       Peripheral Intravenous Line  Duration             Peripheral IV 05/14/24 Dorsal (posterior);Proximal;Right Forearm 1 day                       Recent Cultures (last 7 days):           Last 24 Hours Medication List:   Current Facility-Administered Medications   Medication Dose Route Frequency Provider Last Rate    acetaminophen  650 mg Oral Q6H PRN Antonio Cisse MD      carvedilol  25 mg Oral BID With Meals Hilda Cristobal PA-C      famotidine  20 mg Oral BID Hilda Cristobal PA-C      [START ON 5/17/2024] fenofibrate  145 mg Oral Daily Antonio Cisse MD      heparin (porcine)  5,000 Units Subcutaneous Q8H [facility] Hilda Cristobal PA-C      HYDROmorphone  1 mg Intravenous Q4H PRN Antonio Cisse MD      insulin glargine  20 Units Subcutaneous HS Antonio Cisse MD      insulin lispro  1-5 Units Subcutaneous Q6H Hilda Cristobal PA-C      nicotine  1 patch Transdermal Daily Hilda Cristobal PA-C      omega-3-acid ethyl esters  2 g Oral BID Antonio Cisse MD      oxyCODONE  20 mg Oral Q4H PRN Hilda Cristobal PA-C      pancrelipase (Lip-Prot-Amyl)  12,000 Units Oral TID With Meals Antonio Cisse MD      pantoprazole  40 mg Oral Early Morning Hilda Cristobal PA-C          Today, Patient Was Seen By: Antonio Cisse MD

## 2024-05-16 NOTE — ASSESSMENT & PLAN NOTE
Pdmp confirmed oxycodone 20mg q 4h prn for spinal stenosis followed by neurosx  Dilaudid 1 mg IV every 4 hours as needed

## 2024-05-16 NOTE — ASSESSMENT & PLAN NOTE
Suspect acute on chronic pancreatitis.  Pt reports traditionally this was from hypertriglyceridemia although [facility]  Did have a cocktail at a boxing match with his brother 3 days prior to s/sx onset however which may have contributed to current suspected flare  Ct a/p w/o acute pathology save chronic calcified pseudocysts and pancreatitis  Bowel rest ivf analgesics

## 2024-05-17 VITALS
RESPIRATION RATE: 16 BRPM | WEIGHT: 203.71 LBS | HEIGHT: 70 IN | SYSTOLIC BLOOD PRESSURE: 106 MMHG | HEART RATE: 45 BPM | TEMPERATURE: 97.8 F | BODY MASS INDEX: 29.16 KG/M2 | DIASTOLIC BLOOD PRESSURE: 54 MMHG | OXYGEN SATURATION: 94 %

## 2024-05-17 LAB
ALBUMIN SERPL BCP-MCNC: 3.9 G/DL (ref 3.5–5)
ALP SERPL-CCNC: 76 U/L (ref 34–104)
ALT SERPL W P-5'-P-CCNC: 10 U/L (ref 7–52)
ANION GAP SERPL CALCULATED.3IONS-SCNC: 5 MMOL/L (ref 4–13)
AST SERPL W P-5'-P-CCNC: 15 U/L (ref 13–39)
BASOPHILS # BLD AUTO: 0.01 THOUSANDS/ÂΜL (ref 0–0.1)
BASOPHILS NFR BLD AUTO: 0 % (ref 0–1)
BILIRUB SERPL-MCNC: 0.38 MG/DL (ref 0.2–1)
BUN SERPL-MCNC: 13 MG/DL (ref 5–25)
C PEPTIDE SERPL-MCNC: 1.4 NG/ML (ref 1.1–4.4)
CALCIUM SERPL-MCNC: 8.9 MG/DL (ref 8.4–10.2)
CHLORIDE SERPL-SCNC: 103 MMOL/L (ref 96–108)
CO2 SERPL-SCNC: 28 MMOL/L (ref 21–32)
CREAT SERPL-MCNC: 0.85 MG/DL (ref 0.6–1.3)
EOSINOPHIL # BLD AUTO: 0.05 THOUSAND/ÂΜL (ref 0–0.61)
EOSINOPHIL NFR BLD AUTO: 2 % (ref 0–6)
ERYTHROCYTE [DISTWIDTH] IN BLOOD BY AUTOMATED COUNT: 14.5 % (ref 11.6–15.1)
GFR SERPL CREATININE-BSD FRML MDRD: 104 ML/MIN/1.73SQ M
GLUCOSE SERPL-MCNC: 159 MG/DL (ref 65–140)
HCT VFR BLD AUTO: 42.5 % (ref 36.5–49.3)
HGB BLD-MCNC: 13.5 G/DL (ref 12–17)
IGG SERPL-MCNC: 815 MG/DL (ref 603–1613)
IGG1 SER-MCNC: 508 MG/DL (ref 248–810)
IGG2 SER-MCNC: 132 MG/DL (ref 130–555)
IGG3 SER-MCNC: 31 MG/DL (ref 15–102)
IGG4 SER-MCNC: 13 MG/DL (ref 2–96)
IMM GRANULOCYTES # BLD AUTO: 0.01 THOUSAND/UL (ref 0–0.2)
IMM GRANULOCYTES NFR BLD AUTO: 0 % (ref 0–2)
LYMPHOCYTES # BLD AUTO: 1 THOUSANDS/ÂΜL (ref 0.6–4.47)
LYMPHOCYTES NFR BLD AUTO: 33 % (ref 14–44)
MAGNESIUM SERPL-MCNC: 1.9 MG/DL (ref 1.9–2.7)
MCH RBC QN AUTO: 25.2 PG (ref 26.8–34.3)
MCHC RBC AUTO-ENTMCNC: 31.8 G/DL (ref 31.4–37.4)
MCV RBC AUTO: 79 FL (ref 82–98)
MONOCYTES # BLD AUTO: 0.22 THOUSAND/ÂΜL (ref 0.17–1.22)
MONOCYTES NFR BLD AUTO: 7 % (ref 4–12)
NEUTROPHILS # BLD AUTO: 1.79 THOUSANDS/ÂΜL (ref 1.85–7.62)
NEUTS SEG NFR BLD AUTO: 58 % (ref 43–75)
NRBC BLD AUTO-RTO: 0 /100 WBCS
PLATELET # BLD AUTO: 126 THOUSANDS/UL (ref 149–390)
PMV BLD AUTO: 9.6 FL (ref 8.9–12.7)
POTASSIUM SERPL-SCNC: 3.4 MMOL/L (ref 3.5–5.3)
PROT SERPL-MCNC: 6.3 G/DL (ref 6.4–8.4)
RBC # BLD AUTO: 5.35 MILLION/UL (ref 3.88–5.62)
SODIUM SERPL-SCNC: 136 MMOL/L (ref 135–147)
WBC # BLD AUTO: 3.08 THOUSAND/UL (ref 4.31–10.16)

## 2024-05-17 PROCEDURE — 99239 HOSP IP/OBS DSCHRG MGMT >30: CPT | Performed by: INTERNAL MEDICINE

## 2024-05-17 PROCEDURE — 85025 COMPLETE CBC W/AUTO DIFF WBC: CPT | Performed by: INTERNAL MEDICINE

## 2024-05-17 PROCEDURE — 80053 COMPREHEN METABOLIC PANEL: CPT | Performed by: INTERNAL MEDICINE

## 2024-05-17 PROCEDURE — 83735 ASSAY OF MAGNESIUM: CPT | Performed by: INTERNAL MEDICINE

## 2024-05-17 RX ORDER — CARVEDILOL 25 MG/1
25 TABLET ORAL 2 TIMES DAILY WITH MEALS
COMMUNITY

## 2024-05-17 RX ORDER — FLUTICASONE PROPIONATE AND SALMETEROL 250; 50 UG/1; UG/1
1 POWDER RESPIRATORY (INHALATION) 2 TIMES DAILY
COMMUNITY

## 2024-05-17 RX ORDER — INSULIN GLARGINE 100 [IU]/ML
35 INJECTION, SOLUTION SUBCUTANEOUS
COMMUNITY

## 2024-05-17 RX ORDER — FENOFIBRATE 160 MG/1
160 TABLET ORAL DAILY
COMMUNITY

## 2024-05-17 RX ORDER — OXYCODONE HYDROCHLORIDE 5 MG/1
20 TABLET ORAL EVERY 4 HOURS PRN
COMMUNITY

## 2024-05-17 RX ORDER — OMEGA-3-ACID ETHYL ESTERS 1 G/1
4 CAPSULE, LIQUID FILLED ORAL DAILY
COMMUNITY

## 2024-05-17 RX ORDER — METHOCARBAMOL 750 MG/1
750 TABLET, FILM COATED ORAL EVERY 6 HOURS PRN
COMMUNITY

## 2024-05-17 RX ORDER — POTASSIUM CHLORIDE 20 MEQ/1
40 TABLET, EXTENDED RELEASE ORAL ONCE
Status: COMPLETED | OUTPATIENT
Start: 2024-05-17 | End: 2024-05-17

## 2024-05-17 RX ADMIN — OXYCODONE HYDROCHLORIDE 20 MG: 10 TABLET ORAL at 00:34

## 2024-05-17 RX ADMIN — OXYCODONE HYDROCHLORIDE 20 MG: 10 TABLET ORAL at 08:42

## 2024-05-17 RX ADMIN — CARVEDILOL 25 MG: 25 TABLET, FILM COATED ORAL at 08:41

## 2024-05-17 RX ADMIN — INSULIN LISPRO 1 UNITS: 100 INJECTION, SOLUTION INTRAVENOUS; SUBCUTANEOUS at 00:34

## 2024-05-17 RX ADMIN — PANCRELIPASE 12000 UNITS: 30000; 6000; 19000 CAPSULE, DELAYED RELEASE PELLETS ORAL at 12:40

## 2024-05-17 RX ADMIN — PANTOPRAZOLE SODIUM 40 MG: 40 TABLET, DELAYED RELEASE ORAL at 04:46

## 2024-05-17 RX ADMIN — OXYCODONE HYDROCHLORIDE 20 MG: 10 TABLET ORAL at 04:45

## 2024-05-17 RX ADMIN — FAMOTIDINE 20 MG: 20 TABLET, FILM COATED ORAL at 08:42

## 2024-05-17 RX ADMIN — OXYCODONE HYDROCHLORIDE 20 MG: 10 TABLET ORAL at 12:40

## 2024-05-17 RX ADMIN — INSULIN GLARGINE 20 UNITS: 100 INJECTION, SOLUTION SUBCUTANEOUS at 00:37

## 2024-05-17 RX ADMIN — FENOFIBRATE 145 MG: 145 TABLET, FILM COATED ORAL at 08:42

## 2024-05-17 RX ADMIN — HYDROMORPHONE HYDROCHLORIDE 1 MG: 1 INJECTION, SOLUTION INTRAMUSCULAR; INTRAVENOUS; SUBCUTANEOUS at 11:43

## 2024-05-17 RX ADMIN — HYDROMORPHONE HYDROCHLORIDE 1 MG: 1 INJECTION, SOLUTION INTRAMUSCULAR; INTRAVENOUS; SUBCUTANEOUS at 07:32

## 2024-05-17 RX ADMIN — POTASSIUM CHLORIDE 40 MEQ: 1500 TABLET, EXTENDED RELEASE ORAL at 09:14

## 2024-05-17 RX ADMIN — PANCRELIPASE 12000 UNITS: 30000; 6000; 19000 CAPSULE, DELAYED RELEASE PELLETS ORAL at 08:41

## 2024-05-17 RX ADMIN — HYDROMORPHONE HYDROCHLORIDE 1 MG: 1 INJECTION, SOLUTION INTRAMUSCULAR; INTRAVENOUS; SUBCUTANEOUS at 01:13

## 2024-05-17 NOTE — PLAN OF CARE
Problem: PAIN - ADULT  Goal: Verbalizes/displays adequate comfort level or baseline comfort level  Description: Interventions:  - Encourage patient to monitor pain and request assistance  - Assess pain using appropriate pain scale  - Administer analgesics based on type and severity of pain and evaluate response  - Implement non-pharmacological measures as appropriate and evaluate response  - Consider cultural and social influences on pain and pain management  - Notify physician/advanced practitioner if interventions unsuccessful or patient reports new pain  Outcome: Progressing     Problem: INFECTION - ADULT  Goal: Absence or prevention of progression during hospitalization  Description: INTERVENTIONS:  - Assess and monitor for signs and symptoms of infection  - Monitor lab/diagnostic results  - Monitor all insertion sites, i.e. indwelling lines, tubes, and drains  - Monitor endotracheal if appropriate and nasal secretions for changes in amount and color  - [place] appropriate cooling/warming therapies per order  - Administer medications as ordered  - Instruct and encourage patient and family to use good hand hygiene technique  - Identify and instruct in appropriate isolation precautions for identified infection/condition  Outcome: Progressing  Goal: Absence of fever/infection during neutropenic period  Description: INTERVENTIONS:  - Monitor WBC    Outcome: Progressing     Problem: SAFETY ADULT  Goal: Patient will remain free of falls  Description: INTERVENTIONS:  - Educate patient/family on patient safety including physical limitations  - Instruct patient to call for assistance with activity   - Consult OT/PT to assist with strengthening/mobility   - Keep Call bell within reach  - Keep bed low and locked with side rails adjusted as appropriate  - Keep care items and personal belongings within reach  - Initiate and maintain comfort rounds  - Make Fall Risk Sign visible to staff  - Offer Toileting every 2 Hours,  in advance of need  - Initiate/Maintain bed alarm  - Obtain necessary fall risk management equipment: bed alarm  - Apply yellow socks and bracelet for high fall risk patients  - Consider moving patient to room near nurses station  Outcome: Progressing  Goal: Maintain or return to baseline ADL function  Description: INTERVENTIONS:  -  Assess patient's ability to carry out ADLs; assess patient's baseline for ADL function and identify physical deficits which impact ability to perform ADLs (bathing, care of mouth/teeth, toileting, grooming, dressing, etc.)  - Assess/evaluate cause of self-care deficits   - Assess range of motion  - Assess patient's mobility; develop plan if impaired  - Assess patient's need for assistive devices and provide as appropriate  - Encourage maximum independence but intervene and supervise when necessary  - Involve family in performance of ADLs  - Assess for home care needs following discharge   - Consider OT consult to assist with ADL evaluation and planning for discharge  - Provide patient education as appropriate  Outcome: Progressing  Goal: Maintains/Returns to pre admission functional level  Description: INTERVENTIONS:  - Perform AM-PAC 6 Click Basic Mobility/ Daily Activity assessment daily.  - Set and communicate daily mobility goal to care team and patient/family/caregiver.   - Collaborate with rehabilitation services on mobility goals if consulted  - Perform Range of Motion 4 times a day.  - Reposition patient every 2 hours.  - Dangle patient 3 times a day  - Stand patient 3 times a day  - Ambulate patient 3 times a day  - Out of bed to chair 3 times a day   - Out of bed for meals 3 times a day  - Out of bed for toileting  - Record patient progress and toleration of activity level   Outcome: Progressing     Problem: DISCHARGE PLANNING  Goal: Discharge to home or other facility with appropriate resources  Description: INTERVENTIONS:  - Identify barriers to discharge w/patient and  caregiver  - Arrange for needed discharge resources and transportation as appropriate  - Identify discharge learning needs (meds, wound care, etc.)  - Arrange for interpretive services to assist at discharge as needed  - Refer to Case Management Department for coordinating discharge planning if the patient needs post-hospital services based on physician/advanced practitioner order or complex needs related to functional status, cognitive ability, or social support system  Outcome: Progressing     Problem: Knowledge Deficit  Goal: Patient/family/caregiver demonstrates understanding of disease process, treatment plan, medications, and discharge instructions  Description: Complete learning assessment and assess knowledge base.  Interventions:  - Provide teaching at level of understa- Provide teaching via preferred learning methods  Outcome: Progressing     Problem: GASTROINTESTINAL - ADULT  Goal: Maintains adequate nutritional intake  Description: INTERVENTIONS:  - Monitor percentage of each meal consumed  - Identify factors contributing to decreased intake, treat as appropriate  - Assist with meals as needed  - Monitor I&O, weight, and lab values if indicated  - Obtain nutrition services referral as needed  Outcome: Progressing     Problem: HEMATOLOGIC - ADULT  Goal: Maintains hematologic stability  Description: INTERVENTIONS  - Assess for signs and symptoms of bleeding or hemorrhage  - Monitor labs  - Administer supportive blood products/factors as ordered and appropriate  Outcome: Progressing

## 2024-05-17 NOTE — PLAN OF CARE
Problem: PAIN - ADULT  Goal: Verbalizes/displays adequate comfort level or baseline comfort level  Description: Interventions:  - Encourage patient to monitor pain and request assistance  - Assess pain using appropriate pain scale  - Administer analgesics based on type and severity of pain and evaluate response  - Implement non-pharmacological measures as appropriate and evaluate response  - Consider cultural and social influences on pain and pain management  - Notify physician/advanced practitioner if interventions unsuccessful or patient reports new pain  5/17/2024 0308 by Shukri Alexis RN  Outcome: Progressing  5/17/2024 0307 by Shukri Alexis RN  Outcome: Progressing     Problem: INFECTION - ADULT  Goal: Absence or prevention of progression during hospitalization  Description: INTERVENTIONS:  - Assess and monitor for signs and symptoms of infection  - Monitor lab/diagnostic results  - Monitor all insertion sites, i.e. indwelling lines, tubes, and drains  - Monitor endotracheal if appropriate and nasal secretions for changes in amount and color  - [place] appropriate cooling/warming therapies per order  - Administer medications as ordered  - Instruct and encourage patient and family to use good hand hygiene technique  - Identify and instruct in appropriate isolation precautions for identified infection/condition  5/17/2024 0308 by Shukri Alexis RN  Outcome: Progressing  5/17/2024 0307 by Shukri Alexis RN  Outcome: Progressing  Goal: Absence of fever/infection during neutropenic period  Description: INTERVENTIONS:  - Monitor WBC    5/17/2024 0308 by Shukri Alexis RN  Outcome: Progressing  5/17/2024 0307 by Shukri Alexis RN  Outcome: Progressing     Problem: SAFETY ADULT  Goal: Patient will remain free of falls  Description: INTERVENTIONS:  - Educate patient/family on patient safety including physical limitations  - Instruct patient to call for assistance with activity   - Consult OT/PT to assist with  strengthening/mobility   - Keep Call bell within reach  - Keep bed low and locked with side rails adjusted as appropriate  - Keep care items and personal belongings within reach  - Initiate and maintain comfort rounds  - Make Fall Risk Sign visible to staff  - Offer Toileting every 2 Hours, in advance of need  - Initiate/Maintain bed alarm  - Obtain necessary fall risk management equipment: bed alarm  - Apply yellow socks and bracelet for high fall risk patients  - Consider moving patient to room near nurses station  5/17/2024 0308 by Shukri Alexis RN  Outcome: Progressing  5/17/2024 0307 by Shukri Alexis RN  Outcome: Progressing  Goal: Maintain or return to baseline ADL function  Description: INTERVENTIONS:  -  Assess patient's ability to carry out ADLs; assess patient's baseline for ADL function and identify physical deficits which impact ability to perform ADLs (bathing, care of mouth/teeth, toileting, grooming, dressing, etc.)  - Assess/evaluate cause of self-care deficits   - Assess range of motion  - Assess patient's mobility; develop plan if impaired  - Assess patient's need for assistive devices and provide as appropriate  - Encourage maximum independence but intervene and supervise when necessary  - Involve family in performance of ADLs  - Assess for home care needs following discharge   - Consider OT consult to assist with ADL evaluation and planning for discharge  - Provide patient education as appropriate  5/17/2024 0308 by Shukri Alexis RN  Outcome: Progressing  5/17/2024 0307 by Shukri Alexis RN  Outcome: Progressing  Goal: Maintains/Returns to pre admission functional level  Description: INTERVENTIONS:  - Perform AM-PAC 6 Click Basic Mobility/ Daily Activity assessment daily.  - Set and communicate daily mobility goal to care team and patient/family/caregiver.   - Collaborate with rehabilitation services on mobility goals if consulted  - Perform Range of Motion 4 times a day.  - Reposition patient  every 2 hours.  - Dangle patient 3 times a day  - Stand patient 3 times a day  - Ambulate patient 3 times a day  - Out of bed to chair 3 times a day   - Out of bed for meals 3 times a day  - Out of bed for toileting  - Record patient progress and toleration of activity level   5/17/2024 0308 by Shukri Alexis RN  Outcome: Progressing  5/17/2024 0307 by Shukri Alexis RN  Outcome: Progressing     Problem: DISCHARGE PLANNING  Goal: Discharge to home or other facility with appropriate resources  Description: INTERVENTIONS:  - Identify barriers to discharge w/patient and caregiver  - Arrange for needed discharge resources and transportation as appropriate  - Identify discharge learning needs (meds, wound care, etc.)  - Arrange for interpretive services to assist at discharge as needed  - Refer to Case Management Department for coordinating discharge planning if the patient needs post-hospital services based on physician/advanced practitioner order or complex needs related to functional status, cognitive ability, or social support system  5/17/2024 0308 by Shukri Alexis RN  Outcome: Progressing  5/17/2024 0307 by Shukri Alexis RN  Outcome: Progressing     Problem: Knowledge Deficit  Goal: Patient/family/caregiver demonstrates understanding of disease process, treatment plan, medications, and discharge instructions  Description: Complete learning assessment and assess knowledge base.  Interventions:  - Provide teaching at level of understanding  - Provide teaching via preferred learning methods  5/17/2024 0308 by Shukri Alexis RN  Outcome: Progressing  5/17/2024 0307 by Shukri Alexis RN  Outcome: Progressing     Problem: GASTROINTESTINAL - ADULT  Goal: Maintains adequate nutritional intake  Description: INTERVENTIONS:  - Monitor percentage of each meal consumed  - Identify factors contributing to decreased intake, treat as appropriate  - Assist with meals as needed  - Monitor I&O, weight, and lab values if indicated  -  Obtain nutrition services referral as needed  5/17/2024 0308 by Shukri Alexis RN  Outcome: Progressing  5/17/2024 0307 by Shukri Alexis RN  Outcome: Progressing     Problem: HEMATOLOGIC - ADULT  Goal: Maintains hematologic stability  Description: INTERVENTIONS  - Assess for signs and symptoms of bleeding or hemorrhage  - Monitor labs  - Administer supportive blood products/factors as ordered and appropriate  5/17/2024 0308 by Shukri Alexis RN  Outcome: Progressing  5/17/2024 0307 by Shukri Alexis RN  Outcome: Progressing

## 2024-05-17 NOTE — DISCHARGE SUMMARY
[facility]  Discharge- Davian Landin 1980, 44 y.o. male MRN: [id number]  Unit/Bed#: [place] [address] -01 Encounter: [id number]  Primary Care Provider: Estella Castro MD   Date and time admitted to hospital: 5/14/2024  9:15 PM    * Acute on chronic pancreatitis (HCC)  Assessment & Plan  CT scan revealed pancreatic calcifications without peripancreatic stranding and stable chronic splenic vein occlusion    Pt reports traditionally this was from hypertriglyceridemia although notably listed as idiopathic from [facility] records Did have a cocktail at a boxing match with his brother 3 days prior to s/sx onset however which may have contributed to current suspected flare  GI input appreciated  Patient feeling better and tolerating surgical soft diet denies any nausea, vomiting, having bowel movements  Patient still has abdominal discomfort but overall feels better  He is otherwise stable for discharge home today with outpatient follow-up  Advised patient to return to the ED if he has any worsening symptoms  Continue Creon    Chronic, continuous use of opioids  Assessment & Plan  Pdmp confirmed oxycodone 20mg q 4h prn for spinal stenosis followed by neurosx      Type 2 diabetes mellitus with hyperglycemia, without long-term current use of insulin (HCC)  Assessment & Plan  Lab Results   Component Value Date    HGBA1C 7.5 (H) 05/16/2024       Recent Labs     05/15/24  0757   POCGLU 131       Continue home Lantus regimen  Endocrinology input appreciated  Rare occurrences of pancreatitis with SGLT 2 therefore recommending not to resume Jardiance      Familial hypertriglyceridemia  Assessment & Plan  Continue fenofibrate and Lovaza    Chronic thrombosis of splenic vein  Assessment & Plan  Not on ac.  Had prior IR embolization to reduce risk of portal htn in 2016  W/mild splenomegaly on ct a/p likely on basis of chronic recurrent pancreatitis        Transition of Care Discharge Summary - [facility]  Medicine    Patient Information: Davian Landin 44 y.o. male MRN: [id number]  Unit/Bed#: [place] [address] -01 Encounter: [id number]    Discharging Physician / Practitioner: Antonio Cisse MD  PCP: Estella Castro MD  Admission Date: 5/14/2024  Discharge Date: 05/17/24    Disposition:      Other: home      Reason for Admission: acute on Chronic pancreatitis    Discharge Diagnoses:     Principal Problem:    Acute on chronic pancreatitis (HCC)  Active Problems:    Chronic thrombosis of splenic vein    Familial hypertriglyceridemia    Type 2 diabetes mellitus with hyperglycemia, without long-term current use of insulin (HCC)    Chronic, continuous use of opioids  Resolved Problems:    * No resolved hospital problems. *      Consultations During Hospital Stay:  IP CONSULT TO GASTROENTEROLOGY  IP CONSULT TO ENDOCRINOLOGY      Procedures Performed:     none    Medication Adjustments and Discharge Medications:  Medication Dosing Tapers - Please refer to Discharge Medication List for details on any medication dosing tapers (if applicable to patient).  Discharge Medication List: See after visit summary for reconciled discharge medications.     Wound Care Recommendations:  When applicable, please see wound care section of After Visit Summary.    Diet Recommendations at Discharge:  Diet -        Diet Orders   (From admission, onward)                 Start     Ordered    05/16/24 1451  Diet Surgical; Surgical Soft/Lite Meal; Lo Fat, Consistent Carbohydrate Diet Level 2 (5 carb servings/75 grams CHO/meal)  Diet effective now        References:    Adult Nutrition Support Algorithm    RD Therapeutic Diet Order Protocol   Question Answer Comment   Diet Type Surgical    Surgical Surgical Soft/Lite Meal    Other Restriction(s): Lo Fat    Other Restriction(s): Consistent Carbohydrate Diet Level 2 (5 carb servings/75 grams CHO/meal)    RD to adjust diet per protocol? Yes        05/16/24 1450                  Fluid Restriction - No Fluid  "Restriction at Discharge.      Significant Findings / Test Results:     CT abdomen pelvis with contrast    Addendum Date: 5/15/2024    ADDENDUM: In the body of the report, in the pancreas section, it should read that there is \"no significant peripancreatic inflammatory stranding\".    Result Date: 5/15/2024  Impression: No acute intra-abdominal abnormality. No free air or free fluid. Reidentified several coarse and dystrophic calcifications at the distal pancreatic body and tail suggestive of chronic pancreatitis.  There is no peripancreatic inflammatory stranding to suggest acute pancreatitis. 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 Workstation performed: [id number]      Hospital Course:     Davian Landin is a 44 y.o. male patient who originally presented to the hospital on 5/14/2024 due to abdominal pain ongoing for the past 1 week.  Patient has history of pancreatitis from familial hypertriglyceridemia patient also notes to have drank a cocktail at a boxing match with his brother 3 days prior to his symptoms starting.  GI was consulted patient was initially started as n.p.o. with pain medications.  He is gradually advanced to clear liquids and surgical soft and he has been tolerating this without difficulty.  Patient continues to have pain but he has chronic pain with for which he takes pain medications outpatient.  Denies any nausea or vomiting.  He is otherwise stable for discharge home with outpatient follow-up.    Please see above problem list for further details.      Condition at Discharge: good     Discharge Day Visit / Exam:     Subjective: Seen and examined at bedside, still having some abdominal discomfort but overall feels better tolerating diet and having bowel movements    Vitals: Blood Pressure: 106/54 (05/17/24 0821)  Pulse: (!) 45 (05/17/24 0821)  Temperature: 97.8 °F (36.6 °C) (05/17/24 0821)  Temp Source: Oral (05/17/24 0821)  Respirations: 16 (05/17/24 " "0821)  Height: 5' 10\" (177.8 cm) (05/15/24 0802)  Weight - Scale: 92.4 kg (203 lb 11.3 oz) (05/15/24 0802)  SpO2: 94 % (05/17/24 0821)    Physical Exam:    Constitutional: Patient is oriented to person, place and time, no acute distress  HEENT:  Normocephalic, atraumatic  Cardiovascular: Normal S1S2, RRR, No murmurs/rubs/gallops appreciated.  Pulmonary:  Bilateral air entry, No rhonchi/rales/wheezing appreciated  Abdominal: Soft, Bowel sounds present, Non-tender, Non-distended  Extremities:  No cyanosis, clubbing or edema.   Neurological: Cranial nerves II-XII grossly intact, sensation intact, otherwise no focal neurological symptoms.     Discharge instructions/Information to patient and family:   See after visit summary section titled Discharge Instructions for information provided to patient and family.      Planned Readmission: no      Discharge Statement:  I spent 35 minutes discharging the patient. This time was spent on the day of discharge. I had direct contact with the patient on the day of discharge. Greater than 50% of the total time was spent examining patient, answering all patient questions, arranging and discussing plan of care with patient as well as directly providing post-discharge instructions.  Additional time then spent on discharge activities.    ** Please Note: This note has been constructed using a voice recognition system **                  "
